# Patient Record
Sex: MALE | Race: WHITE | NOT HISPANIC OR LATINO | Employment: OTHER | URBAN - METROPOLITAN AREA
[De-identification: names, ages, dates, MRNs, and addresses within clinical notes are randomized per-mention and may not be internally consistent; named-entity substitution may affect disease eponyms.]

---

## 2025-01-12 ENCOUNTER — APPOINTMENT (OUTPATIENT)
Dept: CARDIOLOGY | Facility: HOSPITAL | Age: 89
End: 2025-01-12
Payer: MEDICARE

## 2025-01-12 ENCOUNTER — HOSPITAL ENCOUNTER (INPATIENT)
Facility: HOSPITAL | Age: 89
LOS: 2 days | Discharge: HOME | End: 2025-01-15
Attending: EMERGENCY MEDICINE | Admitting: INTERNAL MEDICINE
Payer: MEDICARE

## 2025-01-12 ENCOUNTER — APPOINTMENT (OUTPATIENT)
Dept: RADIOLOGY | Facility: HOSPITAL | Age: 89
End: 2025-01-12
Payer: MEDICARE

## 2025-01-12 DIAGNOSIS — I48.92 ATRIAL FIB/FLUTTER, TRANSIENT (MULTI): ICD-10-CM

## 2025-01-12 DIAGNOSIS — I10 PRIMARY HYPERTENSION: ICD-10-CM

## 2025-01-12 DIAGNOSIS — E03.9 HYPOTHYROIDISM, ACQUIRED: ICD-10-CM

## 2025-01-12 DIAGNOSIS — N18.9 ACUTE KIDNEY INJURY SUPERIMPOSED ON CKD (CMS-HCC): ICD-10-CM

## 2025-01-12 DIAGNOSIS — Z95.810 CARDIAC DEFIBRILLATOR IN PLACE: ICD-10-CM

## 2025-01-12 DIAGNOSIS — L03.90 WOUND CELLULITIS: ICD-10-CM

## 2025-01-12 DIAGNOSIS — N17.9 ACUTE KIDNEY INJURY SUPERIMPOSED ON CKD (CMS-HCC): ICD-10-CM

## 2025-01-12 DIAGNOSIS — H04.123 CHRONICALLY DRY EYES, BILATERAL: ICD-10-CM

## 2025-01-12 DIAGNOSIS — Z45.02 IMPLANTABLE CARDIOVERTER-DEFIBRILLATOR (ICD) DISCHARGE: ICD-10-CM

## 2025-01-12 DIAGNOSIS — I47.10 SVT (SUPRAVENTRICULAR TACHYCARDIA) (CMS-HCC): Primary | ICD-10-CM

## 2025-01-12 DIAGNOSIS — I42.8 CARDIOMYOPATHY, NONISCHEMIC (MULTI): Chronic | ICD-10-CM

## 2025-01-12 DIAGNOSIS — M17.12 PRIMARY OSTEOARTHRITIS OF LEFT KNEE: ICD-10-CM

## 2025-01-12 DIAGNOSIS — I48.91 ATRIAL FIB/FLUTTER, TRANSIENT (MULTI): ICD-10-CM

## 2025-01-12 DIAGNOSIS — I47.20 VENTRICULAR TACHYARRHYTHMIA (MULTI): ICD-10-CM

## 2025-01-12 DIAGNOSIS — I50.23 ACUTE ON CHRONIC SYSTOLIC HEART FAILURE: ICD-10-CM

## 2025-01-12 DIAGNOSIS — I50.20 HEART FAILURE WITH REDUCED EJECTION FRACTION: ICD-10-CM

## 2025-01-12 DIAGNOSIS — I44.2 CHB (COMPLETE HEART BLOCK): ICD-10-CM

## 2025-01-12 DIAGNOSIS — R00.2 PALPITATIONS: ICD-10-CM

## 2025-01-12 DIAGNOSIS — E87.6 HYPOKALEMIA: ICD-10-CM

## 2025-01-12 DIAGNOSIS — I47.29 VENTRICULAR TACHYCARDIA (PAROXYSMAL) (MULTI): ICD-10-CM

## 2025-01-12 LAB
ALBUMIN SERPL BCP-MCNC: 4.5 G/DL (ref 3.4–5)
ALP SERPL-CCNC: 146 U/L (ref 33–136)
ALT SERPL W P-5'-P-CCNC: 14 U/L (ref 10–52)
ANION GAP SERPL CALC-SCNC: 15 MMOL/L (ref 10–20)
AST SERPL W P-5'-P-CCNC: 21 U/L (ref 9–39)
BASOPHILS # BLD AUTO: 0.02 X10*3/UL (ref 0–0.1)
BASOPHILS NFR BLD AUTO: 0.3 %
BILIRUB SERPL-MCNC: 1.7 MG/DL (ref 0–1.2)
BNP SERPL-MCNC: 1216 PG/ML (ref 0–99)
BUN SERPL-MCNC: 35 MG/DL (ref 6–23)
CALCIUM SERPL-MCNC: 8.8 MG/DL (ref 8.6–10.3)
CARDIAC TROPONIN I PNL SERPL HS: 59 NG/L (ref 0–20)
CARDIAC TROPONIN I PNL SERPL HS: 66 NG/L (ref 0–20)
CHLORIDE SERPL-SCNC: 97 MMOL/L (ref 98–107)
CO2 SERPL-SCNC: 30 MMOL/L (ref 21–32)
CREAT SERPL-MCNC: 1.93 MG/DL (ref 0.5–1.3)
EGFRCR SERPLBLD CKD-EPI 2021: 33 ML/MIN/1.73M*2
EOSINOPHIL # BLD AUTO: 0.02 X10*3/UL (ref 0–0.4)
EOSINOPHIL NFR BLD AUTO: 0.3 %
ERYTHROCYTE [DISTWIDTH] IN BLOOD BY AUTOMATED COUNT: 16.5 % (ref 11.5–14.5)
GLUCOSE BLD MANUAL STRIP-MCNC: 189 MG/DL (ref 74–99)
GLUCOSE SERPL-MCNC: 240 MG/DL (ref 74–99)
HCT VFR BLD AUTO: 45.7 % (ref 41–52)
HGB BLD-MCNC: 14.3 G/DL (ref 13.5–17.5)
IMM GRANULOCYTES # BLD AUTO: 0.01 X10*3/UL (ref 0–0.5)
IMM GRANULOCYTES NFR BLD AUTO: 0.2 % (ref 0–0.9)
LYMPHOCYTES # BLD AUTO: 1.32 X10*3/UL (ref 0.8–3)
LYMPHOCYTES NFR BLD AUTO: 21.6 %
MAGNESIUM SERPL-MCNC: 2.26 MG/DL (ref 1.6–2.4)
MCH RBC QN AUTO: 27.6 PG (ref 26–34)
MCHC RBC AUTO-ENTMCNC: 31.3 G/DL (ref 32–36)
MCV RBC AUTO: 88 FL (ref 80–100)
MONOCYTES # BLD AUTO: 0.42 X10*3/UL (ref 0.05–0.8)
MONOCYTES NFR BLD AUTO: 6.9 %
NEUTROPHILS # BLD AUTO: 4.31 X10*3/UL (ref 1.6–5.5)
NEUTROPHILS NFR BLD AUTO: 70.7 %
NRBC BLD-RTO: 0 /100 WBCS (ref 0–0)
PLATELET # BLD AUTO: 174 X10*3/UL (ref 150–450)
POTASSIUM SERPL-SCNC: 3.2 MMOL/L (ref 3.5–5.3)
PROT SERPL-MCNC: 7.7 G/DL (ref 6.4–8.2)
RBC # BLD AUTO: 5.18 X10*6/UL (ref 4.5–5.9)
SODIUM SERPL-SCNC: 139 MMOL/L (ref 136–145)
WBC # BLD AUTO: 6.1 X10*3/UL (ref 4.4–11.3)

## 2025-01-12 PROCEDURE — 93005 ELECTROCARDIOGRAM TRACING: CPT

## 2025-01-12 PROCEDURE — 2500000004 HC RX 250 GENERAL PHARMACY W/ HCPCS (ALT 636 FOR OP/ED): Performed by: EMERGENCY MEDICINE

## 2025-01-12 PROCEDURE — 36415 COLL VENOUS BLD VENIPUNCTURE: CPT | Performed by: EMERGENCY MEDICINE

## 2025-01-12 PROCEDURE — 84100 ASSAY OF PHOSPHORUS: CPT | Performed by: EMERGENCY MEDICINE

## 2025-01-12 PROCEDURE — 71045 X-RAY EXAM CHEST 1 VIEW: CPT

## 2025-01-12 PROCEDURE — 85025 COMPLETE CBC W/AUTO DIFF WBC: CPT | Performed by: EMERGENCY MEDICINE

## 2025-01-12 PROCEDURE — 84484 ASSAY OF TROPONIN QUANT: CPT | Performed by: EMERGENCY MEDICINE

## 2025-01-12 PROCEDURE — 96374 THER/PROPH/DIAG INJ IV PUSH: CPT

## 2025-01-12 PROCEDURE — 80053 COMPREHEN METABOLIC PANEL: CPT | Performed by: EMERGENCY MEDICINE

## 2025-01-12 PROCEDURE — 71045 X-RAY EXAM CHEST 1 VIEW: CPT | Performed by: STUDENT IN AN ORGANIZED HEALTH CARE EDUCATION/TRAINING PROGRAM

## 2025-01-12 PROCEDURE — 99291 CRITICAL CARE FIRST HOUR: CPT

## 2025-01-12 PROCEDURE — 99291 CRITICAL CARE FIRST HOUR: CPT | Mod: 25 | Performed by: EMERGENCY MEDICINE

## 2025-01-12 PROCEDURE — 83735 ASSAY OF MAGNESIUM: CPT | Performed by: EMERGENCY MEDICINE

## 2025-01-12 PROCEDURE — 83880 ASSAY OF NATRIURETIC PEPTIDE: CPT | Performed by: EMERGENCY MEDICINE

## 2025-01-12 PROCEDURE — 82947 ASSAY GLUCOSE BLOOD QUANT: CPT

## 2025-01-12 RX ORDER — POTASSIUM CHLORIDE 20 MEQ/1
40 TABLET, EXTENDED RELEASE ORAL DAILY
Status: DISCONTINUED | OUTPATIENT
Start: 2025-01-13 | End: 2025-01-13

## 2025-01-12 RX ADMIN — AMIODARONE HYDROCHLORIDE 1 MG/MIN: 1.8 INJECTION, SOLUTION INTRAVENOUS at 21:26

## 2025-01-12 RX ADMIN — AMIODARONE HYDROCHLORIDE 150 MG: 1.5 INJECTION, SOLUTION INTRAVENOUS at 21:14

## 2025-01-12 ASSESSMENT — COLUMBIA-SUICIDE SEVERITY RATING SCALE - C-SSRS
2. HAVE YOU ACTUALLY HAD ANY THOUGHTS OF KILLING YOURSELF?: NO
1. IN THE PAST MONTH, HAVE YOU WISHED YOU WERE DEAD OR WISHED YOU COULD GO TO SLEEP AND NOT WAKE UP?: NO
6. HAVE YOU EVER DONE ANYTHING, STARTED TO DO ANYTHING, OR PREPARED TO DO ANYTHING TO END YOUR LIFE?: NO

## 2025-01-12 ASSESSMENT — PAIN SCALES - GENERAL
PAINLEVEL_OUTOF10: 2
PAINLEVEL_OUTOF10: 8

## 2025-01-12 ASSESSMENT — PAIN DESCRIPTION - DESCRIPTORS: DESCRIPTORS: DISCOMFORT

## 2025-01-12 ASSESSMENT — PAIN - FUNCTIONAL ASSESSMENT: PAIN_FUNCTIONAL_ASSESSMENT: 0-10

## 2025-01-12 ASSESSMENT — PAIN DESCRIPTION - PAIN TYPE: TYPE: ACUTE PAIN

## 2025-01-12 ASSESSMENT — PAIN DESCRIPTION - LOCATION: LOCATION: CHEST

## 2025-01-13 ENCOUNTER — APPOINTMENT (OUTPATIENT)
Dept: CARDIOLOGY | Facility: HOSPITAL | Age: 89
End: 2025-01-13
Payer: MEDICARE

## 2025-01-13 PROBLEM — H04.123 CHRONICALLY DRY EYES, BILATERAL: Status: ACTIVE | Noted: 2017-09-28

## 2025-01-13 PROBLEM — R07.9 CHEST PAIN: Status: ACTIVE | Noted: 2024-08-31

## 2025-01-13 PROBLEM — I21.3 ST ELEVATION MYOCARDIAL INFARCTION (STEMI) (MULTI): Status: ACTIVE | Noted: 2018-07-18

## 2025-01-13 PROBLEM — E03.9 HYPOTHYROIDISM, ACQUIRED: Status: ACTIVE | Noted: 2022-04-26

## 2025-01-13 PROBLEM — L03.90 WOUND CELLULITIS: Status: ACTIVE | Noted: 2022-09-12

## 2025-01-13 PROBLEM — E87.6 HYPOKALEMIA: Status: ACTIVE | Noted: 2024-09-28

## 2025-01-13 PROBLEM — I50.23 ACUTE ON CHRONIC SYSTOLIC HEART FAILURE: Status: ACTIVE | Noted: 2023-11-02

## 2025-01-13 PROBLEM — N18.9 ACUTE KIDNEY INJURY SUPERIMPOSED ON CKD (CMS-HCC): Status: ACTIVE | Noted: 2024-09-28

## 2025-01-13 PROBLEM — M17.12 PRIMARY OSTEOARTHRITIS OF LEFT KNEE: Status: ACTIVE | Noted: 2018-06-29

## 2025-01-13 PROBLEM — R00.2 PALPITATIONS: Status: ACTIVE | Noted: 2024-08-29

## 2025-01-13 PROBLEM — I50.20 HEART FAILURE WITH REDUCED EJECTION FRACTION: Status: ACTIVE | Noted: 2024-09-01

## 2025-01-13 PROBLEM — I44.2 CHB (COMPLETE HEART BLOCK): Status: ACTIVE | Noted: 2024-03-14

## 2025-01-13 PROBLEM — J18.9 PNEUMONIA DUE TO INFECTIOUS ORGANISM: Status: ACTIVE | Noted: 2024-12-26

## 2025-01-13 PROBLEM — N17.9 ACUTE KIDNEY INJURY SUPERIMPOSED ON CKD (CMS-HCC): Status: ACTIVE | Noted: 2024-09-28

## 2025-01-13 LAB
ALBUMIN SERPL BCP-MCNC: 3.3 G/DL (ref 3.4–5)
ANION GAP SERPL CALC-SCNC: 12 MMOL/L (ref 10–20)
AORTIC VALVE MEAN GRADIENT: 1 MMHG
AORTIC VALVE PEAK VELOCITY: 0.68 M/S
ATRIAL RATE: 105 BPM
ATRIAL RATE: 77 BPM
AV PEAK GRADIENT: 2 MMHG
AVA (PEAK VEL): 2.48 CM2
AVA (VTI): 2.82 CM2
BUN SERPL-MCNC: 34 MG/DL (ref 6–23)
CALCIUM SERPL-MCNC: 8.2 MG/DL (ref 8.6–10.3)
CHLORIDE SERPL-SCNC: 103 MMOL/L (ref 98–107)
CO2 SERPL-SCNC: 30 MMOL/L (ref 21–32)
CREAT SERPL-MCNC: 1.72 MG/DL (ref 0.5–1.3)
EGFRCR SERPLBLD CKD-EPI 2021: 38 ML/MIN/1.73M*2
EJECTION FRACTION APICAL 4 CHAMBER: 19.4
EJECTION FRACTION: 23 %
ERYTHROCYTE [DISTWIDTH] IN BLOOD BY AUTOMATED COUNT: 16.7 % (ref 11.5–14.5)
GLUCOSE BLD MANUAL STRIP-MCNC: 177 MG/DL (ref 74–99)
GLUCOSE BLD MANUAL STRIP-MCNC: 183 MG/DL (ref 74–99)
GLUCOSE BLD MANUAL STRIP-MCNC: 272 MG/DL (ref 74–99)
GLUCOSE BLD MANUAL STRIP-MCNC: 79 MG/DL (ref 74–99)
GLUCOSE BLD MANUAL STRIP-MCNC: 87 MG/DL (ref 74–99)
GLUCOSE SERPL-MCNC: 220 MG/DL (ref 74–99)
HCT VFR BLD AUTO: 44.3 % (ref 41–52)
HGB BLD-MCNC: 13.8 G/DL (ref 13.5–17.5)
LEFT ATRIUM VOLUME AREA LENGTH INDEX BSA: 47.3 ML/M2
LEFT VENTRICLE INTERNAL DIMENSION DIASTOLE: 5.52 CM (ref 3.5–6)
LEFT VENTRICULAR OUTFLOW TRACT DIAMETER: 2.31 CM
MAGNESIUM SERPL-MCNC: 2.19 MG/DL (ref 1.6–2.4)
MCH RBC QN AUTO: 27.4 PG (ref 26–34)
MCHC RBC AUTO-ENTMCNC: 31.2 G/DL (ref 32–36)
MCV RBC AUTO: 88 FL (ref 80–100)
NRBC BLD-RTO: 0 /100 WBCS (ref 0–0)
PHOSPHATE SERPL-MCNC: 3.2 MG/DL (ref 2.5–4.9)
PHOSPHATE SERPL-MCNC: 3.8 MG/DL (ref 2.5–4.9)
PLATELET # BLD AUTO: 154 X10*3/UL (ref 150–450)
POTASSIUM SERPL-SCNC: 2.9 MMOL/L (ref 3.5–5.3)
Q ONSET: 189 MS
Q ONSET: 191 MS
QRS COUNT: 11 BEATS
QRS COUNT: 18 BEATS
QRS DURATION: 188 MS
QRS DURATION: 192 MS
QT INTERVAL: 452 MS
QT INTERVAL: 522 MS
QTC CALCULATION(BAZETT): 563 MS
QTC CALCULATION(BAZETT): 611 MS
QTC FREDERICIA: 549 MS
QTC FREDERICIA: 553 MS
R AXIS: 199 DEGREES
R AXIS: 213 DEGREES
RBC # BLD AUTO: 5.04 X10*6/UL (ref 4.5–5.9)
RIGHT VENTRICLE FREE WALL PEAK S': 8 CM/S
RIGHT VENTRICLE PEAK SYSTOLIC PRESSURE: 23.2 MMHG
SODIUM SERPL-SCNC: 142 MMOL/L (ref 136–145)
T AXIS: 33 DEGREES
T AXIS: 42 DEGREES
T OFFSET: 415 MS
T OFFSET: 452 MS
TRICUSPID ANNULAR PLANE SYSTOLIC EXCURSION: 0.8 CM
UFH PPP CHRO-ACNC: 0.7 IU/ML
UFH PPP CHRO-ACNC: 0.9 IU/ML
UFH PPP CHRO-ACNC: 1.2 IU/ML
UFH PPP CHRO-ACNC: 1.5 IU/ML
VENTRICULAR RATE: 110 BPM
VENTRICULAR RATE: 70 BPM
WBC # BLD AUTO: 5.2 X10*3/UL (ref 4.4–11.3)

## 2025-01-13 PROCEDURE — 2500000004 HC RX 250 GENERAL PHARMACY W/ HCPCS (ALT 636 FOR OP/ED)

## 2025-01-13 PROCEDURE — 99291 CRITICAL CARE FIRST HOUR: CPT | Performed by: STUDENT IN AN ORGANIZED HEALTH CARE EDUCATION/TRAINING PROGRAM

## 2025-01-13 PROCEDURE — 82947 ASSAY GLUCOSE BLOOD QUANT: CPT

## 2025-01-13 PROCEDURE — 4B02XTZ MEASUREMENT OF CARDIAC DEFIBRILLATOR, EXTERNAL APPROACH: ICD-10-PCS | Performed by: EMERGENCY MEDICINE

## 2025-01-13 PROCEDURE — 93306 TTE W/DOPPLER COMPLETE: CPT | Performed by: STUDENT IN AN ORGANIZED HEALTH CARE EDUCATION/TRAINING PROGRAM

## 2025-01-13 PROCEDURE — 99223 1ST HOSP IP/OBS HIGH 75: CPT | Performed by: STUDENT IN AN ORGANIZED HEALTH CARE EDUCATION/TRAINING PROGRAM

## 2025-01-13 PROCEDURE — C8929 TTE W OR WO FOL WCON,DOPPLER: HCPCS

## 2025-01-13 PROCEDURE — 93010 ELECTROCARDIOGRAM REPORT: CPT | Performed by: INTERNAL MEDICINE

## 2025-01-13 PROCEDURE — 80069 RENAL FUNCTION PANEL: CPT

## 2025-01-13 PROCEDURE — 99291 CRITICAL CARE FIRST HOUR: CPT | Performed by: INTERNAL MEDICINE

## 2025-01-13 PROCEDURE — 85027 COMPLETE CBC AUTOMATED: CPT

## 2025-01-13 PROCEDURE — 2500000001 HC RX 250 WO HCPCS SELF ADMINISTERED DRUGS (ALT 637 FOR MEDICARE OP)

## 2025-01-13 PROCEDURE — 2060000001 HC INTERMEDIATE ICU ROOM DAILY

## 2025-01-13 PROCEDURE — 36415 COLL VENOUS BLD VENIPUNCTURE: CPT

## 2025-01-13 PROCEDURE — 85520 HEPARIN ASSAY: CPT

## 2025-01-13 PROCEDURE — 94760 N-INVAS EAR/PLS OXIMETRY 1: CPT

## 2025-01-13 PROCEDURE — 2500000004 HC RX 250 GENERAL PHARMACY W/ HCPCS (ALT 636 FOR OP/ED): Performed by: EMERGENCY MEDICINE

## 2025-01-13 PROCEDURE — 9420000001 HC RT PATIENT EDUCATION 5 MIN

## 2025-01-13 PROCEDURE — 2500000002 HC RX 250 W HCPCS SELF ADMINISTERED DRUGS (ALT 637 FOR MEDICARE OP, ALT 636 FOR OP/ED)

## 2025-01-13 PROCEDURE — 83735 ASSAY OF MAGNESIUM: CPT

## 2025-01-13 PROCEDURE — 85520 HEPARIN ASSAY: CPT | Performed by: INTERNAL MEDICINE

## 2025-01-13 PROCEDURE — 93005 ELECTROCARDIOGRAM TRACING: CPT

## 2025-01-13 RX ORDER — OXYCODONE HYDROCHLORIDE 5 MG/1
5 TABLET ORAL EVERY 6 HOURS PRN
Status: DISCONTINUED | OUTPATIENT
Start: 2025-01-13 | End: 2025-01-15 | Stop reason: HOSPADM

## 2025-01-13 RX ORDER — CALC/MAG/B COMPLEX/D3/HERB 61
15 TABLET ORAL DAILY PRN
COMMUNITY

## 2025-01-13 RX ORDER — SPIRONOLACTONE 25 MG/1
25 TABLET ORAL
Status: DISCONTINUED | OUTPATIENT
Start: 2025-01-14 | End: 2025-01-15 | Stop reason: HOSPADM

## 2025-01-13 RX ORDER — MAGNESIUM GLYCINATE 100 MG
2 CAPSULE ORAL DAILY
COMMUNITY

## 2025-01-13 RX ORDER — AMIODARONE HYDROCHLORIDE 200 MG/1
200 TABLET ORAL DAILY
COMMUNITY
End: 2025-01-15 | Stop reason: HOSPADM

## 2025-01-13 RX ORDER — LEVOTHYROXINE SODIUM 75 UG/1
75 TABLET ORAL EVERY MORNING
Status: DISCONTINUED | OUTPATIENT
Start: 2025-01-13 | End: 2025-01-15

## 2025-01-13 RX ORDER — CARVEDILOL 12.5 MG/1
25 TABLET ORAL 3 TIMES DAILY
Status: DISCONTINUED | OUTPATIENT
Start: 2025-01-13 | End: 2025-01-15 | Stop reason: HOSPADM

## 2025-01-13 RX ORDER — POTASSIUM CHLORIDE 14.9 MG/ML
20 INJECTION INTRAVENOUS
Status: DISPENSED | OUTPATIENT
Start: 2025-01-13 | End: 2025-01-13

## 2025-01-13 RX ORDER — INSULIN GLARGINE 100 [IU]/ML
15 INJECTION, SOLUTION SUBCUTANEOUS NIGHTLY
COMMUNITY

## 2025-01-13 RX ORDER — FUROSEMIDE 40 MG/1
60 TABLET ORAL DAILY
Status: DISCONTINUED | OUTPATIENT
Start: 2025-01-13 | End: 2025-01-15 | Stop reason: HOSPADM

## 2025-01-13 RX ORDER — ROSUVASTATIN CALCIUM 10 MG/1
10 TABLET, COATED ORAL DAILY
COMMUNITY

## 2025-01-13 RX ORDER — POTASSIUM CHLORIDE 20 MEQ/1
40 TABLET, EXTENDED RELEASE ORAL ONCE
Status: COMPLETED | OUTPATIENT
Start: 2025-01-13 | End: 2025-01-13

## 2025-01-13 RX ORDER — CARVEDILOL 25 MG/1
25 TABLET ORAL 3 TIMES DAILY
COMMUNITY

## 2025-01-13 RX ORDER — INSULIN GLARGINE 100 [IU]/ML
7 INJECTION, SOLUTION SUBCUTANEOUS NIGHTLY
Status: DISCONTINUED | OUTPATIENT
Start: 2025-01-13 | End: 2025-01-13

## 2025-01-13 RX ORDER — POTASSIUM CHLORIDE 20 MEQ/1
20 TABLET, EXTENDED RELEASE ORAL DAILY
COMMUNITY
End: 2025-01-15 | Stop reason: HOSPADM

## 2025-01-13 RX ORDER — SPIRONOLACTONE 25 MG/1
12.5 TABLET ORAL
Status: DISCONTINUED | OUTPATIENT
Start: 2025-01-13 | End: 2025-01-13

## 2025-01-13 RX ORDER — INSULIN GLARGINE 100 [IU]/ML
15 INJECTION, SOLUTION SUBCUTANEOUS NIGHTLY
Status: DISCONTINUED | OUTPATIENT
Start: 2025-01-13 | End: 2025-01-15 | Stop reason: HOSPADM

## 2025-01-13 RX ORDER — LEVOTHYROXINE SODIUM 75 UG/1
75 TABLET ORAL
COMMUNITY
Start: 2024-10-21

## 2025-01-13 RX ORDER — TAMSULOSIN HYDROCHLORIDE 0.4 MG/1
0.4 CAPSULE ORAL NIGHTLY
COMMUNITY
Start: 2024-04-29

## 2025-01-13 RX ORDER — HEPARIN SODIUM 10000 [USP'U]/100ML
0-4500 INJECTION, SOLUTION INTRAVENOUS CONTINUOUS
Status: DISCONTINUED | OUTPATIENT
Start: 2025-01-13 | End: 2025-01-15

## 2025-01-13 RX ORDER — INSULIN LISPRO 100 [IU]/ML
0-10 INJECTION, SOLUTION INTRAVENOUS; SUBCUTANEOUS
Status: DISCONTINUED | OUTPATIENT
Start: 2025-01-13 | End: 2025-01-14

## 2025-01-13 RX ORDER — OXYCODONE AND ACETAMINOPHEN 5; 325 MG/1; MG/1
1 TABLET ORAL EVERY 8 HOURS PRN
COMMUNITY

## 2025-01-13 RX ORDER — FUROSEMIDE 20 MG/1
60 TABLET ORAL DAILY
COMMUNITY

## 2025-01-13 RX ORDER — LISINOPRIL 10 MG/1
10 TABLET ORAL DAILY
COMMUNITY

## 2025-01-13 RX ORDER — MAGNESIUM GLYCINATE 100 MG
200 CAPSULE ORAL
COMMUNITY
Start: 2024-09-29

## 2025-01-13 RX ORDER — POTASSIUM CHLORIDE 1.5 G/1.58G
40 POWDER, FOR SOLUTION ORAL ONCE
Status: COMPLETED | OUTPATIENT
Start: 2025-01-13 | End: 2025-01-13

## 2025-01-13 RX ORDER — DEXTROSE 50 % IN WATER (D50W) INTRAVENOUS SYRINGE
12.5
Status: DISCONTINUED | OUTPATIENT
Start: 2025-01-13 | End: 2025-01-15 | Stop reason: HOSPADM

## 2025-01-13 RX ORDER — DEXTROSE 50 % IN WATER (D50W) INTRAVENOUS SYRINGE
25
Status: DISCONTINUED | OUTPATIENT
Start: 2025-01-13 | End: 2025-01-15 | Stop reason: HOSPADM

## 2025-01-13 RX ORDER — LISINOPRIL 5 MG/1
10 TABLET ORAL DAILY
Status: DISCONTINUED | OUTPATIENT
Start: 2025-01-13 | End: 2025-01-15 | Stop reason: HOSPADM

## 2025-01-13 RX ORDER — GLIPIZIDE 5 MG/1
5 TABLET, FILM COATED, EXTENDED RELEASE ORAL DAILY
COMMUNITY

## 2025-01-13 RX ORDER — INSULIN LISPRO 100 [IU]/ML
0-10 INJECTION, SOLUTION INTRAVENOUS; SUBCUTANEOUS EVERY 4 HOURS
Status: DISCONTINUED | OUTPATIENT
Start: 2025-01-13 | End: 2025-01-13

## 2025-01-13 RX ORDER — ACETAMINOPHEN 325 MG/1
650 TABLET ORAL EVERY 6 HOURS PRN
Status: DISCONTINUED | OUTPATIENT
Start: 2025-01-13 | End: 2025-01-15 | Stop reason: HOSPADM

## 2025-01-13 RX ORDER — POTASSIUM CHLORIDE 20 MEQ/1
40 TABLET, EXTENDED RELEASE ORAL ONCE
Status: DISCONTINUED | OUTPATIENT
Start: 2025-01-13 | End: 2025-01-14

## 2025-01-13 RX ORDER — POTASSIUM CHLORIDE 20 MEQ/1
20 TABLET, EXTENDED RELEASE ORAL DAILY
Status: DISCONTINUED | OUTPATIENT
Start: 2025-01-14 | End: 2025-01-15

## 2025-01-13 RX ORDER — AMIODARONE HYDROCHLORIDE 200 MG/1
200 TABLET ORAL DAILY
Status: DISCONTINUED | OUTPATIENT
Start: 2025-01-13 | End: 2025-01-15

## 2025-01-13 RX ORDER — OXYCODONE HYDROCHLORIDE 5 MG/1
2.5 TABLET ORAL EVERY 6 HOURS PRN
Status: DISCONTINUED | OUTPATIENT
Start: 2025-01-13 | End: 2025-01-15 | Stop reason: HOSPADM

## 2025-01-13 RX ORDER — TAMSULOSIN HYDROCHLORIDE 0.4 MG/1
0.4 CAPSULE ORAL DAILY
Status: DISCONTINUED | OUTPATIENT
Start: 2025-01-13 | End: 2025-01-15 | Stop reason: HOSPADM

## 2025-01-13 RX ADMIN — POTASSIUM CHLORIDE 20 MEQ: 14.9 INJECTION, SOLUTION INTRAVENOUS at 14:08

## 2025-01-13 RX ADMIN — HEPARIN SODIUM 1500 UNITS/HR: 10000 INJECTION, SOLUTION INTRAVENOUS at 01:25

## 2025-01-13 RX ADMIN — TAMSULOSIN HYDROCHLORIDE 0.4 MG: 0.4 CAPSULE ORAL at 10:01

## 2025-01-13 RX ADMIN — SPIRONOLACTONE 12.5 MG: 25 TABLET ORAL at 14:36

## 2025-01-13 RX ADMIN — POTASSIUM CHLORIDE 40 MEQ: 1.5 POWDER, FOR SOLUTION ORAL at 14:36

## 2025-01-13 RX ADMIN — AMIODARONE HYDROCHLORIDE 0.5 MG/MIN: 1.8 INJECTION, SOLUTION INTRAVENOUS at 15:42

## 2025-01-13 RX ADMIN — INSULIN LISPRO 6 UNITS: 100 INJECTION, SOLUTION INTRAVENOUS; SUBCUTANEOUS at 05:12

## 2025-01-13 RX ADMIN — AMIODARONE HYDROCHLORIDE 0.5 MG/MIN: 1.8 INJECTION, SOLUTION INTRAVENOUS at 05:26

## 2025-01-13 RX ADMIN — CARVEDILOL 25 MG: 12.5 TABLET, FILM COATED ORAL at 20:41

## 2025-01-13 RX ADMIN — CARVEDILOL 25 MG: 12.5 TABLET, FILM COATED ORAL at 14:08

## 2025-01-13 RX ADMIN — FUROSEMIDE 60 MG: 40 TABLET ORAL at 10:01

## 2025-01-13 RX ADMIN — CARVEDILOL 25 MG: 12.5 TABLET, FILM COATED ORAL at 10:01

## 2025-01-13 RX ADMIN — PERFLUTREN 2 ML OF DILUTION: 6.52 INJECTION, SUSPENSION INTRAVENOUS at 09:28

## 2025-01-13 RX ADMIN — POTASSIUM CHLORIDE 40 MEQ: 1500 TABLET, EXTENDED RELEASE ORAL at 02:27

## 2025-01-13 RX ADMIN — LEVOTHYROXINE SODIUM 75 MCG: 75 TABLET ORAL at 10:01

## 2025-01-13 RX ADMIN — AMIODARONE HYDROCHLORIDE 1 MG/MIN: 1.8 INJECTION, SOLUTION INTRAVENOUS at 03:43

## 2025-01-13 RX ADMIN — INSULIN GLARGINE 15 UNITS: 100 INJECTION, SOLUTION SUBCUTANEOUS at 20:41

## 2025-01-13 RX ADMIN — POTASSIUM CHLORIDE 20 MEQ: 14.9 INJECTION, SOLUTION INTRAVENOUS at 10:01

## 2025-01-13 RX ADMIN — INSULIN LISPRO 2 UNITS: 100 INJECTION, SOLUTION INTRAVENOUS; SUBCUTANEOUS at 01:24

## 2025-01-13 SDOH — SOCIAL STABILITY: SOCIAL INSECURITY: ABUSE: ADULT

## 2025-01-13 SDOH — SOCIAL STABILITY: SOCIAL INSECURITY: ARE YOU OR HAVE YOU BEEN THREATENED OR ABUSED PHYSICALLY, EMOTIONALLY, OR SEXUALLY BY ANYONE?: NO

## 2025-01-13 SDOH — SOCIAL STABILITY: SOCIAL INSECURITY: DO YOU FEEL ANYONE HAS EXPLOITED OR TAKEN ADVANTAGE OF YOU FINANCIALLY OR OF YOUR PERSONAL PROPERTY?: NO

## 2025-01-13 SDOH — ECONOMIC STABILITY: FOOD INSECURITY: WITHIN THE PAST 12 MONTHS, THE FOOD YOU BOUGHT JUST DIDN'T LAST AND YOU DIDN'T HAVE MONEY TO GET MORE.: NEVER TRUE

## 2025-01-13 SDOH — ECONOMIC STABILITY: INCOME INSECURITY: IN THE PAST 12 MONTHS HAS THE ELECTRIC, GAS, OIL, OR WATER COMPANY THREATENED TO SHUT OFF SERVICES IN YOUR HOME?: NO

## 2025-01-13 SDOH — SOCIAL STABILITY: SOCIAL INSECURITY
WITHIN THE LAST YEAR, HAVE YOU BEEN RAPED OR FORCED TO HAVE ANY KIND OF SEXUAL ACTIVITY BY YOUR PARTNER OR EX-PARTNER?: NO

## 2025-01-13 SDOH — SOCIAL STABILITY: SOCIAL INSECURITY: ARE THERE ANY APPARENT SIGNS OF INJURIES/BEHAVIORS THAT COULD BE RELATED TO ABUSE/NEGLECT?: NO

## 2025-01-13 SDOH — ECONOMIC STABILITY: FOOD INSECURITY: WITHIN THE PAST 12 MONTHS, YOU WORRIED THAT YOUR FOOD WOULD RUN OUT BEFORE YOU GOT THE MONEY TO BUY MORE.: NEVER TRUE

## 2025-01-13 SDOH — SOCIAL STABILITY: SOCIAL INSECURITY: WITHIN THE LAST YEAR, HAVE YOU BEEN HUMILIATED OR EMOTIONALLY ABUSED IN OTHER WAYS BY YOUR PARTNER OR EX-PARTNER?: NO

## 2025-01-13 SDOH — SOCIAL STABILITY: SOCIAL INSECURITY: HAVE YOU HAD THOUGHTS OF HARMING ANYONE ELSE?: NO

## 2025-01-13 SDOH — SOCIAL STABILITY: SOCIAL INSECURITY: HAVE YOU HAD ANY THOUGHTS OF HARMING ANYONE ELSE?: NO

## 2025-01-13 SDOH — SOCIAL STABILITY: SOCIAL INSECURITY: WITHIN THE LAST YEAR, HAVE YOU BEEN AFRAID OF YOUR PARTNER OR EX-PARTNER?: NO

## 2025-01-13 SDOH — SOCIAL STABILITY: SOCIAL INSECURITY: WERE YOU ABLE TO COMPLETE ALL THE BEHAVIORAL HEALTH SCREENINGS?: YES

## 2025-01-13 SDOH — SOCIAL STABILITY: SOCIAL INSECURITY: DOES ANYONE TRY TO KEEP YOU FROM HAVING/CONTACTING OTHER FRIENDS OR DOING THINGS OUTSIDE YOUR HOME?: NO

## 2025-01-13 SDOH — SOCIAL STABILITY: SOCIAL INSECURITY: DO YOU FEEL UNSAFE GOING BACK TO THE PLACE WHERE YOU ARE LIVING?: NO

## 2025-01-13 SDOH — SOCIAL STABILITY: SOCIAL INSECURITY
WITHIN THE LAST YEAR, HAVE YOU BEEN KICKED, HIT, SLAPPED, OR OTHERWISE PHYSICALLY HURT BY YOUR PARTNER OR EX-PARTNER?: NO

## 2025-01-13 SDOH — ECONOMIC STABILITY: HOUSING INSECURITY: IN THE PAST 12 MONTHS, HOW MANY TIMES HAVE YOU MOVED WHERE YOU WERE LIVING?: 0

## 2025-01-13 SDOH — SOCIAL STABILITY: SOCIAL INSECURITY: HAS ANYONE EVER THREATENED TO HURT YOUR FAMILY OR YOUR PETS?: NO

## 2025-01-13 ASSESSMENT — COGNITIVE AND FUNCTIONAL STATUS - GENERAL
DAILY ACTIVITIY SCORE: 21
STANDING UP FROM CHAIR USING ARMS: A LITTLE
PATIENT BASELINE BEDBOUND: NO
DRESSING REGULAR LOWER BODY CLOTHING: A LITTLE
CLIMB 3 TO 5 STEPS WITH RAILING: A LITTLE
HELP NEEDED FOR BATHING: A LITTLE
WALKING IN HOSPITAL ROOM: A LITTLE
MOBILITY SCORE: 20
TOILETING: A LITTLE
MOVING TO AND FROM BED TO CHAIR: A LITTLE

## 2025-01-13 ASSESSMENT — ACTIVITIES OF DAILY LIVING (ADL)
FEEDING YOURSELF: INDEPENDENT
PATIENT'S MEMORY ADEQUATE TO SAFELY COMPLETE DAILY ACTIVITIES?: YES
BATHING: INDEPENDENT
GROOMING: INDEPENDENT
ADEQUATE_TO_COMPLETE_ADL: YES
LACK_OF_TRANSPORTATION: NO
HEARING - LEFT EAR: FUNCTIONAL
HEARING - RIGHT EAR: FUNCTIONAL
DRESSING YOURSELF: INDEPENDENT
LACK_OF_TRANSPORTATION: NO
JUDGMENT_ADEQUATE_SAFELY_COMPLETE_DAILY_ACTIVITIES: YES
TOILETING: INDEPENDENT
WALKS IN HOME: INDEPENDENT

## 2025-01-13 ASSESSMENT — PAIN SCALES - GENERAL
PAINLEVEL_OUTOF10: 0 - NO PAIN

## 2025-01-13 ASSESSMENT — ENCOUNTER SYMPTOMS
FATIGUE: 1
PALPITATIONS: 1
SHORTNESS OF BREATH: 1

## 2025-01-13 ASSESSMENT — PAIN - FUNCTIONAL ASSESSMENT
PAIN_FUNCTIONAL_ASSESSMENT: 0-10

## 2025-01-13 ASSESSMENT — LIFESTYLE VARIABLES
AUDIT-C TOTAL SCORE: 0
HOW OFTEN DO YOU HAVE A DRINK CONTAINING ALCOHOL: NEVER
HOW OFTEN DO YOU HAVE 6 OR MORE DRINKS ON ONE OCCASION: NEVER
AUDIT-C TOTAL SCORE: 0
SKIP TO QUESTIONS 9-10: 1
HOW MANY STANDARD DRINKS CONTAINING ALCOHOL DO YOU HAVE ON A TYPICAL DAY: PATIENT DOES NOT DRINK

## 2025-01-13 ASSESSMENT — PATIENT HEALTH QUESTIONNAIRE - PHQ9
1. LITTLE INTEREST OR PLEASURE IN DOING THINGS: NOT AT ALL
SUM OF ALL RESPONSES TO PHQ9 QUESTIONS 1 & 2: 0
2. FEELING DOWN, DEPRESSED OR HOPELESS: NOT AT ALL

## 2025-01-13 NOTE — CARE PLAN
The patient's goals for the shift include      The clinical goals for the shift include pt will have HR <100 by end of shift

## 2025-01-13 NOTE — ED TRIAGE NOTES
Pt arrived to ED via [facility] ambulance. Pt states that prior to calling 911 he started to experience midsternal chest pain. Pt has a medtronic pacemaker defibrillator in place. When EMS arrived, pt was given a nitroglycerin and he took his own 324mg of aspirin. He states it is just discomfort. Per EMS patient was having runs of VTACH x3 on their monitor.

## 2025-01-13 NOTE — CONSULTS
Inpatient consult to cardiology  Consult performed by: Preston Elliott MD  Consult ordered by: Sarath Bourne, LAUREN-CNP  Reason for consult: VT      History Of Present Illness:    Valentin Rojas is a 89 y.o. male presenting with  with PMH of HFrEF/NICM,  VT s/p ablation (6/27/13, Dr. Mcclendon), redo VT ablation 6/6/14 (Dr. Amin) due to recurrent SMVT requiring ATP/shocks, 3rd redo VT ablation for recurrent VT (10/3/2014, Dr. Poon), post-procedure CHB s/p CRT-D upgrade (7/16/13, generator change 7/2018), atrial fibrillation (DCCV 10/2019), admitted 2/2020 for VT storm with 33 ICD shocks treated with and discharged on amiodarone, with recent admissions to [facility] for ICD shock, CKD3, T2DM, HTN, HLD, GERD, BPH, hypothyroidism, CKD3a who presented to [facility] ED on 1/12/25 with midsternal chest discomfort and palpitations. He follows with cardiology at [facility].    Patient reports near syncope and strong palpitations for a couple of weeks. He started feeling these last evening and decided that he needed to go to the hospital. Per EMS, he was having runs of VT on the monitor. Patient was recently discharged on 12/28/24 after being admitted for HFrEF exacerbation. He was admitted at [facility] 9/2024 with ICD firing. Home medications include amiodarone, Eliquis, Coreg, Jardiance, Lasix, lisinopril, potassium, rosuvastatin. Initial labs significant for potassium 2.9, BUN 34, creatinine 1.72, BNP 1216, troponin 66, 59.    Telemetry shows frequent episodes of NSVT during the night, now doing better. He is currently on amiodarone drip, potassium is being repleted. He is on Coreg 25 mg TID, Lasix 60 mg daily, lisinopril 10 mg daily, spironolactone.     Device interrogation from today revealed one episode of VT treated successfully with ATP, several episodes of NSVT, 100% afib burden. No recent shocks.         Last Recorded Vitals:  Vitals:    01/13/25 0414 01/13/25 0500 01/13/25 0540 01/13/25 0600   BP:  121/82  119/82    BP Location:       Patient Position:       Pulse:  72  72   Resp:  15  18   Temp:       TempSrc:       SpO2: 96% 95%  96%   Weight:   85.1 kg (187 lb 9.8 oz)    Height:           Last Labs:  CBC - 1/13/2025:  6:00 AM  5.2 13.8 154    44.3      CMP - 1/13/2025:  6:00 AM  8.2 7.7 21 --- 1.7   3.2 3.3 14 146      PTT - No results in last year.  _   _ _     Troponin I, High Sensitivity   Date/Time Value Ref Range Status   01/12/2025 10:48 PM 59 (HH) 0 - 20 ng/L Final     Comment:     Previous result verified on 1/12/2025 2156 on specimen/case [id number] called with component [facility] for procedure Troponin I, High Sensitivity, Initial with value 66 ng/L.   01/12/2025 09:22 PM 66 (HH) 0 - 20 ng/L Final     BNP   Date/Time Value Ref Range Status   01/12/2025 09:22 PM 1,216 (H) 0 - 99 pg/mL Final     Hemoglobin A1C   Date/Time Value Ref Range Status   08/29/2024 06:58 PM 7.7 (H) 4.7 - 6.4 % Final     Comment:     Interpretation:     Standardized A1c  Good control or normal:  4-6% ( mg/dL avg)  Moderate control:        6.1-8.0% (120-180 mg/dL avg)  Poor control:            >8.0% (180 mg/dL avg)  With 4% as a baseline, each 1% increase = 30 mg/dL increase in average   glucose.  Taken from DCCT (Diabetes Control Complications Trial)   08/20/2024 09:31 AM 8.1 (H) 4.7 - 6.4 % Final     Comment:     Interpretation:     Standardized A1c  Good control or normal:  4-6% ( mg/dL avg)  Moderate control:        6.1-8.0% (120-180 mg/dL avg)  Poor control:            >8.0% (180 mg/dL avg)  With 4% as a baseline, each 1% increase = 30 mg/dL increase in average   glucose.  Taken from DCCT (Diabetes Control Complications Trial)      Last I/O:  I/O last 3 completed shifts:  In: 426.7 (5 mL/kg) [I.V.:426.7 (5 mL/kg)]  Out: 0 (0 mL/kg)   Weight: 85.1 kg     Past Cardiology Tests (Last 3 Years):  Echo:  9/3/2024 [facility]  CONCLUSIONS:   - Exam indication: Evaluation of known heart failure to guide therapy   - The left ventricle is  "normal in size. There is mild posterior left ventricular   hypertrophy. Left ventricular systolic function is moderately decreased. EF = 36 ±    5% (2D biplane) Left ventricular diastolic function was not evaluated due to   pacing.   - The right ventricle is dilated. Right ventricular systolic function is normal.   - The left atrial cavity is severely dilated.   - The right atrial cavity is mildly dilated.   - The visualized aorta is borderline dilated with a maximal dimension of 3.9 cm.   - There is moderate (2+) tricuspid valve regurgitation.   - Exam was compared with the prior  echocardiographic exam performed on   2/14/2020, LV systolic function has improved ( it was 20%)       Electronically signed by Jenny Wilson MD on 9/3/2024 at 4:55:52 PM      Ejection Fractions:  No results found for: \"EF\"  Cath:  ERVICE DATE: 9/3/2024   SERVICE TIME:  1:07 PM       CARDIOLOGIST: Amber Hurley MD   ATTENDING: Lorri Smith MD   PRIMARY CARE PHYSICIAN: Puja Barrera DO   REFERRING PROVIDER: Puja Barrera DO     [place] STUDY OF HEALTH and AGING SCALE:6=Moderately Frail     CARDIOVASCULAR INSTABILITY:No     PRE-PROCEDURE DIAGNOSIS:   Abnormal Stress Test     POST PROCEDURE DIAGNOSIS:   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 Moderate sedation consisting of continuous ECG,   pulse oximetry and cardiopulmonary monitoring was performed by   the Cardiology Nurse, overseen by supervising physician, for an   intra-service time of 0 hr. 20 min.     Sedative Medications:   Drug: Versed   Dose: 1 mg   Route: IV     Drug: Fentanyl   Dose: 25 mcg   Route: IV   PRIORITY AT TIME OF PROCEDURE: Elective     SITE OF ENTRY: Radial:Right Radial     CONTRAST: Omnipaque 350 mg Iodine/mL (iohexol injection,   solution): 25 mL     ADDITIONAL " PROCEDURES     CORONARY ANGIOGRAPHY:   The patient was taken to the cardiac cath lab where the entry   site was prepped and draped in a sterile manner. Under local   anesthesic with 2% Lidocaine, the vessel was cannulated with   Seldinger's technique using an arterial needle and a 6F sheath   was introduced.     Selective injections were made in the left and right coronary   arteries and various right, left and oblique views were obtained.       ADDITIONAL PROCEDURES     LEFT MAIN TRUNK: No Stenosis     LEFT ANTERIOR DESCENDIN% Stenosis     Location: Proximal      DIAGONAL #1: No Stenosis   DIAGONAL #2: No Stenosis     LEFT CIRCUMFLEX: 20% Stenosis    Location: Proximal     MARGINAL #1: No Stenosis     MARGINAL #2: No Stenosis     MARGINAL #3: No Stenosis     DOMINANT: YES   POSTERIOR DESCENDIN% Stenosis    Location: Proximal      RIGHT CORONARY: No Stenosis     DOMINANT: NO     Hemodynamics:   LVEDP: 8 mm Hg.   LV-Ao gradient : 0 mm Hg.   LVEF: Not done. LEVEF available by nuclear image. + CKD.       The sheath was removed and hemostatsis was established using   manual pressure using wrist band. There was no bleeding at the   end of the procedure. The pt was returned to the recovery room in   a stable condition     ESTIMATED BLOOD LOSS: Less Than Minimal Unless Noted Here.     COMPLICATIONS: None     SPECIMENS: No specimens obtained unless noted here.     CONDITION: Stable     RECOMMENDATIONS: Follow protocol of post-op orders. Aggressive   modification of risk factors. [employer] [facility].   SIGNATURE: Amber Hurley MD PATIENT NAME: Valentin Rojas      Stress Test:  Nuclear Stress Test 2024  PATIENT:   Name: MR. VALENTIN ROJAS   MRN: [id number]   Age: 89 years   Gender: M     CONCLUSIONS:    1. SPECT Perfusion Study: Abnormal.    2. There is mild (<10%) ischemia in the territory of the LCX.    3. There is a small (<10%) fixed perfusion defect in the RCA territory.    4. Left  ventricle is severely dilated. The left ventricle systolic   function is moderately decreased.    5. This is an intermediate risk scan.        LVEF % 34        Past Medical History:  He has no past medical history on file.    Past Surgical History:  He has no past surgical history on file.      Social History:  He reports that he has never smoked. He has never been exposed to tobacco smoke. He has never used smokeless tobacco. No history on file for alcohol use and drug use.    Family History:  No family history on file.     Allergies:  Penicillins    Inpatient Medications:  Scheduled medications   Medication Dose Route Frequency    [Held by provider] amiodarone  200 mg oral Daily    carvedilol  25 mg oral TID    furosemide  60 mg oral Daily    insulin glargine  15 Units subcutaneous Nightly    insulin lispro  0-10 Units subcutaneous TID AC    levothyroxine  75 mcg oral q AM    lisinopril  10 mg oral Daily    perflutren protein A microsphere  0.5 mL intravenous Once in imaging    [START ON 1/14/2025] potassium chloride CR  20 mEq oral Daily    potassium chloride CR  40 mEq oral Once    [START ON 1/14/2025] spironolactone  25 mg oral q24h VENKATESH    sulfur hexafluoride microsphr  2 mL intravenous Once in imaging    tamsulosin  0.4 mg oral Daily     PRN medications   Medication    acetaminophen    dextrose    dextrose    glucagon    glucagon    heparin    oxyCODONE    oxyCODONE     Continuous Medications   Medication Dose Last Rate    amiodarone  0.5 mg/min 0.5 mg/min (01/13/25 1542)    heparin  0-4,500 Units/hr 1,500 Units/hr (01/13/25 0125)     Outpatient Medications:  Current Outpatient Medications   Medication Instructions    amiodarone (PACERONE) 200 mg, Daily    apixaban (ELIQUIS) 2.5 mg, 2 times daily    carvedilol (COREG) 25 mg, oral, 3 times daily    empagliflozin (JARDIANCE) 25 mg, Daily    furosemide (LASIX) 60 mg, Daily    glipiZIDE XL (GLUCOTROL XL) 5 mg, Daily    lansoprazole (PREVACID) 15 mg, oral, Daily  PRN, Do not crush or chew.    Lantus U-100 Insulin 15 Units, Nightly    levothyroxine (SYNTHROID, LEVOXYL) 75 mcg, Daily RT    lisinopril 10 mg, Daily    magnesium glycinate 100 mg magnesium capsule 2 capsules, oral, Daily    magnesium glycinate 200 mg, Daily RT    oxyCODONE-acetaminophen (Percocet) 5-325 mg tablet 1 tablet, oral, Every 8 hours PRN    potassium chloride CR 20 mEq ER tablet 20 mEq, Daily    rosuvastatin (CRESTOR) 10 mg, oral, Daily    tamsulosin (FLOMAX) 0.4 mg, Nightly   Review of Systems   Constitutional:  Positive for fatigue.   Respiratory:  Positive for shortness of breath.    Cardiovascular:  Positive for palpitations and leg swelling.   All other systems reviewed and are negative.         Physical Exam  Vitals reviewed.   Constitutional:       Appearance: Normal appearance. He is normal weight.   HENT:      Head: Normocephalic and atraumatic.   Neck:      Vascular: JVD present. No carotid bruit.   Cardiovascular:      Rate and Rhythm: Normal rate and regular rhythm.      Pulses: Normal pulses.      Heart sounds: Normal heart sounds.   Pulmonary:      Effort: Pulmonary effort is normal.      Breath sounds: Normal breath sounds.   Abdominal:      General: Abdomen is flat. Bowel sounds are normal.      Palpations: Abdomen is soft.   Musculoskeletal:      Right lower le+ Pitting Edema present.      Left lower le+ Pitting Edema present.   Skin:     General: Skin is warm and dry.   Neurological:      General: No focal deficit present.      Mental Status: He is alert and oriented to person, place, and time. Mental status is at baseline.   Psychiatric:         Mood and Affect: Mood normal.         Behavior: Behavior normal.            Assessment/Plan   Patient with history of multiple comorbidities including nonischemic cardiomyopathy status post CRT-D, atrial fibrillation, VT status post multiple ablations and device therapies including shocks with multiple hospital admissions.  Admitted with  VT/nonsustained VT status post ATP, no recent shocks.  Currently on amiodarone IV and GDMT.  Eliquis on hold, on heparin drip.    Patient is doing better since this morning.  I would recommend continuing the same medical therapy. Once patient is loaded with 10 g of amiodarone, I would recommend keeping the him on amiodarone 400 mg daily as a maintenance dose.  Keep potassium over 4 and magnesium over 2.    Peripheral IV 01/12/25 18 G Distal;Right Forearm (Active)   Site Assessment Clean;Dry;Intact 01/13/25 0900   Dressing Status Clean;Dry 01/13/25 0900   Number of days: 1       Peripheral IV 01/13/25 20 G Proximal;Right;Anterior Forearm (Active)   Site Assessment Clean;Dry;Intact 01/13/25 0900   Dressing Status Clean;Dry 01/13/25 0900   Number of days: 0       Code Status:  Full Code    I spent 80 minutes in the professional and overall care of this patient.        Preston Fallon MD

## 2025-01-13 NOTE — HOSPITAL COURSE
Valentin Rojas is an 89 year old male with PMHx of HFrEF/NICM (EF 36% 9/2024) s/p Medtronic ICD placement, CHB s/p CRT, CKD3, T2DM, HTN, HLD, GERD, BPH, hypothyroidism, V. Tach s/p ablation, A-fib (on Eliquis) who presented to [facility] ED on 1/12/25 with midsternal chest discomfort. When EMS arrived, he was given nitroglycerin and took his own 324mg of aspirin. Per EMS, he was having runs of V tach x3 on the monitor. Per patient's son at bedside, he states that he spoke to his father on the phone this morning around 9am and he sounded more short of breath than normal. Patient explained that this is around the time he started to generally feel unwell. He felt like his heart was fluttering the entire day and felt as if he was going to pass out, this is all very similar to how he has felt during his previous V. Tach episodes. Patient states that the chest discomfort started around 6pm today. It was non-radiating in nature and he states that the pain would come and go. It is slightly reproducible on exam. He does have history of V. Tach with ICD firing and states he has had about 2 or 3 ablations in the past, thinks his last one was roughly 8-9 years ago. 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     On arrival to the ICU, he endorsed chest discomfort but denied SOB, abdominal pain, n/v, diarrhea/constipation, or urinary symptoms.  Chest discomfort improved quickly, cardiology is consulted, and recommended EP evaluation, device check, TTE.  Patient started on amiodarone drip, Eliquis held and switched to heparin.  Continuing home medication.  Added spironolactone.  Replete electrolytes.  EP recommended 1G IV load, followed by 10G p.o. load and then followed by 400 Mg daily maintenance of amiodarone.  Patient completed IV amiodarone load and was transition to p.o.  Heparin switched back to Eliquis.  Patient asymptomatic, hemodynamically  stable, states he is doing well.  Stable for discharge.  Advised to follow-up with EP & cardiology & PCP.  Potassium changed to packets per patient preference.

## 2025-01-13 NOTE — CONSULTS
Inpatient consult to Cardiology  Consult performed by: Sarath Bourne, LAUREN-CNP  Consult ordered by: Shelbie Swain DO        History Of Present Illness:    Valentin Rojas is a 89 y.o. male with PMH of HFrEF/NICM (EF 36% 9/2024) s/p CRT-D, CHB, CKD3, T2DM, HTN, HLD, GERD, BPH, hypothyroidism, V. Tach s/p ablation, A-fib (on Eliquis), CKD3a who presented to [facility] ED on 1/12/25 with midsternal chest discomfort and palpitations. For 2 weeks prior admission had been waking up with presyncope, severe palpitations. When EMS arrived, he was given nitroglycerin and took his own 324mg of aspirin. Per EMS, he was having runs of V tach x3 on the monitor. He follows with cardiology at [facility]. recently discharged on 12/28/24 after being admitted for HFrEF exacerbation. BNP on admission was 5230. He had stop taking his Lasix PTA. He was admitted at [facility] 9/2024 with ICD firing at which time Amiodarone was increased from 200 to 400 however patient opted against this at home. [facility] 9/2024 with nonobstructive CAD. Home medications include amiodarone, Eliquis, Coreg, Jardiance, Lasix, lisinopril, potassium, rosuvastatin. Labs significant for potassium 2.9, BUN 34, creatinine 1.72, BNP 1216, troponin 66, 59.     Review of Systems   Constitutional:  Positive for fatigue.   Respiratory:  Positive for shortness of breath.    Cardiovascular:  Positive for palpitations and leg swelling.   All other systems reviewed and are negative.      Last Recorded Vitals:  Vitals:    01/13/25 0414 01/13/25 0500 01/13/25 0540 01/13/25 0600   BP:  121/82  119/82   BP Location:       Patient Position:       Pulse:  72  72   Resp:  15  18   Temp:       TempSrc:       SpO2: 96% 95%  96%   Weight:   85.1 kg (187 lb 9.8 oz)    Height:           Last Labs:  CBC - 1/13/2025:  6:00 AM  5.2 13.8 154    44.3      CMP - 1/13/2025:  6:00 AM  8.2 7.7 21 --- 1.7   3.2 3.3 14 146      PTT - No results in last year.  _   _ _     Troponin I, High  Sensitivity   Date/Time Value Ref Range Status   01/12/2025 10:48 PM 59 (HH) 0 - 20 ng/L Final     Comment:     Previous result verified on 1/12/2025 2156 on specimen/case [id number] called with component [facility] for procedure Troponin I, High Sensitivity, Initial with value 66 ng/L.   01/12/2025 09:22 PM 66 (HH) 0 - 20 ng/L Final     BNP   Date/Time Value Ref Range Status   01/12/2025 09:22 PM 1,216 (H) 0 - 99 pg/mL Final     Hemoglobin A1C   Date/Time Value Ref Range Status   08/29/2024 06:58 PM 7.7 (H) 4.7 - 6.4 % Final     Comment:     Interpretation:     Standardized A1c  Good control or normal:  4-6% ( mg/dL avg)  Moderate control:        6.1-8.0% (120-180 mg/dL avg)  Poor control:            >8.0% (180 mg/dL avg)  With 4% as a baseline, each 1% increase = 30 mg/dL increase in average   glucose.  Taken from DCCT (Diabetes Control Complications Trial)   08/20/2024 09:31 AM 8.1 (H) 4.7 - 6.4 % Final     Comment:     Interpretation:     Standardized A1c  Good control or normal:  4-6% ( mg/dL avg)  Moderate control:        6.1-8.0% (120-180 mg/dL avg)  Poor control:            >8.0% (180 mg/dL avg)  With 4% as a baseline, each 1% increase = 30 mg/dL increase in average   glucose.  Taken from DCCT (Diabetes Control Complications Trial)      Last I/O:  I/O last 3 completed shifts:  In: 426.7 (5 mL/kg) [I.V.:426.7 (5 mL/kg)]  Out: 0 (0 mL/kg)   Weight: 85.1 kg     Past Cardiology Tests (Last 3 Years):  EKG:  No results found for this or any previous visit from the past 1095 days.    Echo:  9/3/2024 [facility]  CONCLUSIONS:   - Exam indication: Evaluation of known heart failure to guide therapy   - The left ventricle is normal in size. There is mild posterior left ventricular   hypertrophy. Left ventricular systolic function is moderately decreased. EF = 36 ±    5% (2D biplane) Left ventricular diastolic function was not evaluated due to   pacing.   - The right ventricle is dilated. Right ventricular systolic  "function is normal.   - The left atrial cavity is severely dilated.   - The right atrial cavity is mildly dilated.   - The visualized aorta is borderline dilated with a maximal dimension of 3.9 cm.   - There is moderate (2+) tricuspid valve regurgitation.   - Exam was compared with the prior  echocardiographic exam performed on   2/14/2020, LV systolic function has improved ( it was 20%)       Electronically signed by Jenny Wilson MD on 9/3/2024 at 4:55:52 PM     Ejection Fractions:  No results found for: \"EF\"  Cath:  ERVICE DATE: 9/3/2024   SERVICE TIME:  1:07 PM       CARDIOLOGIST: Amber Hurley MD   ATTENDING: Lorri Smith MD   PRIMARY CARE PHYSICIAN: Puja Barrera DO   REFERRING PROVIDER: Puja Barrera DO     [place] STUDY OF HEALTH and AGING SCALE:6=Moderately Frail     CARDIOVASCULAR INSTABILITY:No     PRE-PROCEDURE DIAGNOSIS:   Abnormal Stress Test     POST PROCEDURE DIAGNOSIS:   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 Moderate sedation consisting of continuous ECG,   pulse oximetry and cardiopulmonary monitoring was performed by   the Cardiology Nurse, overseen by supervising physician, for an   intra-service time of 0 hr. 20 min.     Sedative Medications:   Drug: Versed   Dose: 1 mg   Route: IV     Drug: Fentanyl   Dose: 25 mcg   Route: IV   PRIORITY AT TIME OF PROCEDURE: Elective     SITE OF ENTRY: Radial:Right Radial     CONTRAST: Omnipaque 350 mg Iodine/mL (iohexol injection,   solution): 25 mL     ADDITIONAL PROCEDURES     CORONARY ANGIOGRAPHY:   The patient was taken to the cardiac cath lab where the entry   site was prepped and draped in a sterile manner. Under local   anesthesic with 2% Lidocaine, the vessel was cannulated with   Seldinger's technique using an arterial needle and a 6F sheath   was " introduced.     Selective injections were made in the left and right coronary   arteries and various right, left and oblique views were obtained.       ADDITIONAL PROCEDURES     LEFT MAIN TRUNK: No Stenosis     LEFT ANTERIOR DESCENDIN% Stenosis     Location: Proximal      DIAGONAL #1: No Stenosis   DIAGONAL #2: No Stenosis     LEFT CIRCUMFLEX: 20% Stenosis    Location: Proximal     MARGINAL #1: No Stenosis     MARGINAL #2: No Stenosis     MARGINAL #3: No Stenosis     DOMINANT: YES   POSTERIOR DESCENDIN% Stenosis    Location: Proximal      RIGHT CORONARY: No Stenosis     DOMINANT: NO     Hemodynamics:   LVEDP: 8 mm Hg.   LV-Ao gradient : 0 mm Hg.   LVEF: Not done. LEVEF available by nuclear image. + CKD.       The sheath was removed and hemostatsis was established using   manual pressure using wrist band. There was no bleeding at the   end of the procedure. The pt was returned to the recovery room in   a stable condition     ESTIMATED BLOOD LOSS: Less Than Minimal Unless Noted Here.     COMPLICATIONS: None     SPECIMENS: No specimens obtained unless noted here.     CONDITION: Stable     RECOMMENDATIONS: Follow protocol of post-op orders. Aggressive   modification of risk factors. [employer].   SIGNATURE: Amber Hurley MD PATIENT NAME: Valentin Rojas     Stress Test:  Nuclear Stress Test 2024  PATIENT:   Name: MR. VALENTIN ROJAS   MRN: [id number]   Age: 89 years   Gender: M     CONCLUSIONS:    1. SPECT Perfusion Study: Abnormal.    2. There is mild (<10%) ischemia in the territory of the LCX.    3. There is a small (<10%) fixed perfusion defect in the RCA territory.    4. Left ventricle is severely dilated. The left ventricle systolic   function is moderately decreased.    5. This is an intermediate risk scan.        LVEF % 34     Cardiac Imaging:  No results found for this or any previous visit from the past 1095 days.      Remote Device Evaluation    * Device type: CRT-D  "REMOTE TRANSMISSION  * Presenting Rhythm: AP/Bi-V paced  * Battery Status: Battery is at , 5.08 yrs.  * Arrhythmias since 11/26/24: None since recent DCCV in Nov 2024  * Lead Measurements: Capture thresholds, sensing, and lead impedances are appropriate.  * Other Diagnostics: RA pacing 91.1%. Bi-V pacing 96.5%.  * Follow Up: Continue with every 3 month remote transmissions and annual in-clinic visits.    NOTE TO PROVIDERS: \"Cardiac Implanted Devices\" Flowsheets contain detailed Programming and Evaluation data. Full Docket/PDF found below under \"Scanned Documents\".  Procedure Note    Gino Mobley MD - 12/26/2024  Formatting of this note might be different from the original.  Remote Device Evaluation    * Device type: CRT-D REMOTE TRANSMISSION  * Presenting Rhythm: AP/Bi-V paced  * Battery Status: Battery is at , 5.08 yrs.  * Arrhythmias since 11/26/24: None since recent DCCV in Nov 2024  * Lead Measurements: Capture thresholds, sensing, and lead impedances are appropriate.  * Other Diagnostics: RA pacing 91.1%. Bi-V pacing 96.5%.  * Follow Up: Continue with every 3 month remote transmissions and annual in-clinic visits.      Past Medical History:  He has no past medical history on file.    Past Surgical History:  He has no past surgical history on file.      Social History:  He reports that he has never smoked. He has never been exposed to tobacco smoke. He has never used smokeless tobacco. No history on file for alcohol use and drug use.    Family History:  No family history on file.     Allergies:  Patient has no known allergies.    Inpatient Medications:  Scheduled medications   Medication Dose Route Frequency    [Held by provider] amiodarone  200 mg oral Daily    carvedilol  25 mg oral TID    furosemide  60 mg oral Daily    insulin glargine  7 Units subcutaneous Nightly    insulin lispro  0-10 Units subcutaneous q4h    levothyroxine  75 mcg oral q AM    [Held by provider] lisinopril  10 mg oral Daily    " perflutren lipid microspheres  0.5-10 mL of dilution intravenous Once in imaging    perflutren protein A microsphere  0.5 mL intravenous Once in imaging    potassium chloride  20 mEq intravenous q2h    [START ON 1/14/2025] potassium chloride CR  20 mEq oral Daily    potassium chloride CR  40 mEq oral Once    sulfur hexafluoride microsphr  2 mL intravenous Once in imaging    tamsulosin  0.4 mg oral Daily     PRN medications   Medication    acetaminophen    dextrose    dextrose    glucagon    glucagon    heparin    oxyCODONE    oxyCODONE     Continuous Medications   Medication Dose Last Rate    amiodarone  0.5 mg/min 0.5 mg/min (01/13/25 0526)    heparin  0-4,500 Units/hr 1,500 Units/hr (01/13/25 0125)     Outpatient Medications:  Current Outpatient Medications   Medication Instructions    amiodarone (PACERONE) 200 mg, Daily    apixaban (ELIQUIS) 2.5 mg, 2 times daily    carvedilol (COREG) 25 mg, oral, 3 times daily    empagliflozin (JARDIANCE) 25 mg, Daily    furosemide (LASIX) 60 mg, Daily    glipiZIDE XL (GLUCOTROL XL) 5 mg, Daily    lansoprazole (PREVACID) 15 mg, oral, Daily PRN, Do not crush or chew.    Lantus U-100 Insulin 15 Units, Nightly    levothyroxine (SYNTHROID, LEVOXYL) 75 mcg, Daily RT    lisinopril 10 mg, Daily    magnesium glycinate 100 mg magnesium capsule 2 capsules, oral, Daily    magnesium glycinate 200 mg, Daily RT    oxyCODONE-acetaminophen (Percocet) 5-325 mg tablet 1 tablet, oral, Every 8 hours PRN    potassium chloride CR 20 mEq ER tablet 20 mEq, Daily    rosuvastatin (CRESTOR) 10 mg, oral, Daily    tamsulosin (FLOMAX) 0.4 mg, Nightly     Physical Exam  Vitals reviewed.   Constitutional:       Appearance: Normal appearance. He is normal weight.   HENT:      Head: Normocephalic and atraumatic.   Neck:      Vascular: JVD present. No carotid bruit.   Cardiovascular:      Rate and Rhythm: Normal rate and regular rhythm.      Pulses: Normal pulses.      Heart sounds: Normal heart sounds.    Pulmonary:      Effort: Pulmonary effort is normal.      Breath sounds: Normal breath sounds.   Abdominal:      General: Abdomen is flat. Bowel sounds are normal.      Palpations: Abdomen is soft.   Musculoskeletal:      Right lower le+ Pitting Edema present.      Left lower le+ Pitting Edema present.   Skin:     General: Skin is warm and dry.   Neurological:      General: No focal deficit present.      Mental Status: He is alert and oriented to person, place, and time. Mental status is at baseline.   Psychiatric:         Mood and Affect: Mood normal.         Behavior: Behavior normal.         Assessment/Plan   Assessment  89 y.o. male with PMH of HFrEF/NICM (EF 36% 2024) s/p CRT-D, CHB, CKD3, T2DM, HTN, HLD, GERD, BPH, hypothyroidism, VT s/p ablation, A-fib (on Eliquis), CKD3a who presented to [facility] ED on 25 with midsternal chest discomfort and palpitations. Found to have nonsustained VT. [facility] 2024 with nonobstructive CAD. Home medications include amiodarone, Eliquis, Coreg, Jardiance, Lasix, lisinopril, potassium, rosuvastatin. Labs significant for potassium 2.9, BUN 34, creatinine 1.72, BNP 1216, troponin 66, 59.     HFrEF/NICM (EF 36% 2024) s/p CRT-D  Nonsustained VT   Persistent atrial fibrillation  Hypokalemia  HTN  HLD    Plan  -TTE  -Device check  -EP evaluation  -Continue amiodarone gtt  -Eliquis on hold, continue heparin gtt  -Continue Coreg 25 mg TID, Lasix 60 mg daily, lisinopril 10 mg daily  -Begin spironolactone 25 mg daily  -Keep Mag >2, K >4  -Will follow    Peripheral IV 25 18 G Distal;Right Forearm (Active)   Site Assessment Clean;Dry;Intact 25 0255   Dressing Type Transparent 25 0255   Line Status Flushed 25 0255   Dressing Status Clean;Dry;Occlusive 25 0255   Number of days: 1       Peripheral IV 25 20 G Proximal;Right;Anterior Forearm (Active)   Site Assessment Clean;Dry;Intact 25 0611   Dressing Type Transparent 25 0611    Line Status Flushed 01/13/25 0611   Dressing Status Clean;Dry;Occlusive 01/13/25 0611   Number of days: 0       Code Status:  Full Code    I spent minutes in the professional and overall care of this patient.        Sarath Bourne, LAUREN-CNP

## 2025-01-13 NOTE — PROGRESS NOTES
"Medical Intensive Care - Daily Progress Note   Subjective    Valentin Rojas is an 89 year old male with PMHx of HFrEF/NICM (EF 36% 9/2024) s/p Medtronic ICD placement, CHB s/p CRT, CKD3, T2DM, HTN, HLD, GERD, BPH, hypothyroidism, V. Tach s/p ablation, A-fib (on Eliquis) who presented to [facility] ED on 1/12/25 with midsternal chest discomfort. 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     Interval History:  Patient was seen and examined at bedside. No acute events overnight. Patient denies new or worsening symptoms.     Meds    Scheduled medications  [Held by provider] amiodarone, 200 mg, oral, Daily  carvedilol, 25 mg, oral, TID  furosemide, 60 mg, oral, Daily  insulin glargine, 7 Units, subcutaneous, Nightly  insulin lispro, 0-10 Units, subcutaneous, q4h  levothyroxine, 75 mcg, oral, q AM  [Held by provider] lisinopril, 10 mg, oral, Daily  perflutren lipid microspheres, 0.5-10 mL of dilution, intravenous, Once in imaging  perflutren protein A microsphere, 0.5 mL, intravenous, Once in imaging  potassium chloride, 20 mEq, intravenous, q2h  [START ON 1/14/2025] potassium chloride CR, 20 mEq, oral, Daily  potassium chloride CR, 40 mEq, oral, Once  sulfur hexafluoride microsphr, 2 mL, intravenous, Once in imaging  tamsulosin, 0.4 mg, oral, Daily      Continuous medications  amiodarone, 0.5 mg/min, Last Rate: 0.5 mg/min (01/13/25 0526)  heparin, 0-4,500 Units/hr, Last Rate: 1,500 Units/hr (01/13/25 0125)      PRN medications  PRN medications: acetaminophen, dextrose, dextrose, glucagon, glucagon, heparin, oxyCODONE, oxyCODONE     Objective    Blood pressure 119/82, pulse 72, temperature 36.5 °C (97.7 °F), temperature source Temporal, resp. rate 18, height 1.803 m (5' 11\"), weight 85.1 kg (187 lb 9.8 oz), SpO2 96%.     Physical Exam  Constitutional:       General: He is not in acute distress.  Cardiovascular:      Rate and Rhythm: Normal rate.      Heart sounds:      No friction " "rub. No gallop.   Pulmonary:      Effort: Pulmonary effort is normal. No respiratory distress.      Breath sounds: No stridor.   Abdominal:      Palpations: Abdomen is soft.   Neurological:      Mental Status: He is alert and oriented to person, place, and time.            Intake/Output Summary (Last 24 hours) at 1/13/2025 0745  Last data filed at 1/13/2025 0526  Gross per 24 hour   Intake 426.65 ml   Output 0 ml   Net 426.65 ml     Labs:   Results from last 72 hours   Lab Units 01/13/25  0600 01/12/25  2122   SODIUM mmol/L 142 139   POTASSIUM mmol/L 2.9* 3.2*   CHLORIDE mmol/L 103 97*   CO2 mmol/L 30 30   BUN mg/dL 34* 35*   CREATININE mg/dL 1.72* 1.93*   GLUCOSE mg/dL 220* 240*   CALCIUM mg/dL 8.2* 8.8   ANION GAP mmol/L 12 15   EGFR mL/min/1.73m*2 38* 33*   PHOSPHORUS mg/dL 3.2 3.8      Results from last 72 hours   Lab Units 01/13/25  0600 01/12/25  2122   WBC AUTO x10*3/uL 5.2 6.1   HEMOGLOBIN g/dL 13.8 14.3   HEMATOCRIT % 44.3 45.7   PLATELETS AUTO x10*3/uL 154 174   NEUTROS PCT AUTO %  --  70.7   LYMPHS PCT AUTO %  --  21.6   MONOS PCT AUTO %  --  6.9   EOS PCT AUTO %  --  0.3                 Micro/ID:     No results found for: \"URINECULTURE\", \"BLOODCULT\", \"CSFCULTSMEAR\"    Summary of key imaging results from the last 24 hours  Transthoracic Echo (TTE) Complete    Result Date: 1/13/2025   [facility], [address] [address]               Tel [phone] and Fax [phone] TRANSTHORACIC ECHOCARDIOGRAM REPORT  Patient Name:       DAVID Roegrs Physician:    [id number] Nicole Butler MD Study Date:         1/13/2025           Ordering Provider:    [id number] FERMÍN KRAFT MRN/PID:            [id number]            Fellow: Accession#:         [id number]        Nurse:                " Dena Adams RN Date of Birth/Age:  1935 / 89      Sonographer:          Edwige roper RDCS Gender assigned at  M                   Additional Staff: Birth: Height:             180.34 cm           Admit Date:           1/12/2025 Weight:             84.82 kg            Admission Status:     Inpatient -                                                               Routine BSA / BMI:          2.05 m2 / 26.08     Encounter#:           [id number]                     kg/m2 Blood Pressure:     119/82 mmHg         Department Location:  [facility] Non                                                               Invasive Study Type:    TRANSTHORACIC ECHO (TTE) COMPLETE Diagnosis/ICD: Supraventricular tachycardia-I47.1; Ventricular tachycardia,                other-I47.29 Indication:    SVT, VT CPT Code:      Echo Complete w Full [id number] Patient History: Pertinent         V-tach /w ablation, NICM, CKD, DM, Chest Pain, A-Fib and History:          Dyspnea. Study Detail: The following Echo studies were performed: 2D, M-Mode, Doppler and               color flow. Definity used as a contrast agent for endocardial               border definition. Total contrast used for this procedure was 1.0               mL via IV push. Unable to obtain subcostal view. Patient has a               defibrillator. Patient has a pacemaker.               The patient was awake.  PHYSICIAN INTERPRETATION: Left Ventricle: The left ventricular systolic function is severely decreased, with a visually estimated ejection fraction of 20-25%. There is mild eccentric left ventricular hypertrophy. There is global hypokinesis of the left ventricle with minor regional variations. The left ventricular cavity size is moderately dilated. There is mildly increased septal and normal posterior left ventricular wall thickness. Left ventricular diastolic filling cannot be determined, due to right  ventricular pacing. Left Atrium: The left atrium is severely dilated. Right Ventricle: The right ventricle is severely enlarged. There is reduced right ventricular systolic function. A device is visualized in the right ventricle. Right Atrium: The right atrium is moderately dilated. There is a device visualized in the right atrium. Aortic Valve: The aortic valve is trileaflet. There is mild aortic valve cusp calcification. The aortic valve dimensionless index is 0.67. There is no evidence of aortic valve regurgitation. The peak instantaneous gradient of the aortic valve is 2 mmHg. The mean gradient of the aortic valve is 1 mmHg. Mitral Valve: The mitral valve is mild to moderately thickened. There is mild to moderate mitral valve regurgitation. Tricuspid Valve: The tricuspid valve is structurally normal. There is mild to moderate tricuspid regurgitation. The Doppler estimated RVSP is within normal limits at 23.2 mmHg. Pulmonic Valve: The pulmonic valve is structurally normal. There is moderate pulmonic valve regurgitation. Pericardium: There is no pericardial effusion noted. Aorta: The aortic root is normal.  CONCLUSIONS:  1. The left ventricular systolic function is severely decreased, with a visually estimated ejection fraction of 20-25%.  2. There is global hypokinesis of the left ventricle with minor regional variations.  3. Left ventricular cavity size is moderately dilated.  4. There is reduced right ventricular systolic function.  5. Severely enlarged right ventricle.  6. The left atrium is severely dilated.  7. The right atrium is moderately dilated.  8. Mild to moderate mitral valve regurgitation.  9. Mild to moderate tricuspid regurgitation visualized. 10. Right ventricular systolic pressure is within normal limits. 11. There is moderate pulmonic valve regurgitation. QUANTITATIVE DATA SUMMARY:  2D MEASUREMENTS:          Normal Ranges: IVSd:            1.21 cm  (0.6-1.1cm) LVPWd:           0.98 cm   (0.6-1.1cm) LVIDd:           5.52 cm  (3.9-5.9cm) LVIDs:           5.13 cm LV Mass Index:   118 g/m2 LVEDV Index:     74 ml/m2 LV % FS          7.2 %  LA VOLUME:                    Normal Ranges: LA Vol A4C:        85.0 ml    (22+/-6mL/m2) LA Vol A2C:        107.0 ml LA Vol BP:         96.9 ml LA Vol Index A4C:  41.5 ml/m2 LA Vol Index A2C:  52.2 ml/m2 LA Vol Index BP:   47.3 ml/m2 LA Area A4C:       25.1 cm2 LA Area A2C:       28.6 cm2 LA Major Axis A4C: 6.3 cm LA Major Axis A2C: 6.5 cm LA Volume Index:   46.9 ml/m2 LA Vol A4C:        76.9 ml LA Vol A2C:        102.8 ml LA Vol Index BSA:  43.8 ml/m2  RA VOLUME BY A/L METHOD:          Normal Ranges: RA Area A4C:             24.4 cm2  AORTA MEASUREMENTS:         Normal Ranges: Ao Sinus, d:        2.80 cm (2.1-3.5cm) Asc Ao, d:          3.60 cm (2.1-3.4cm)  LV SYSTOLIC FUNCTION BY 2D PLANIMETRY (MOD):                      Normal Ranges: EF-A4C View:    19 % (>=55%) EF-A2C View:    22 % EF-Biplane:     21 % EF-Visual:      23 % LV EF Reported: 23 %  AORTIC VALVE:                     Normal Ranges: AoV Vmax:                0.68 m/s (<=1.7m/s) AoV Peak P.8 mmHg (<20mmHg) AoV Mean P.2 mmHg (1.7-11.5mmHg) LVOT Max Audie:            0.40 m/s (<=1.1m/s) AoV VTI:                 10.28 cm (18-25cm) LVOT VTI:                6.92 cm LVOT Diameter:           2.31 cm  (1.8-2.4cm) AoV Area, VTI:           2.82 cm2 (2.5-5.5cm2) AoV Area,Vmax:           2.48 cm2 (2.5-4.5cm2) AoV Dimensionless Index: 0.67  RIGHT VENTRICLE: RV Basal 5.10 cm RV Mid   4.20 cm RV Major 8.8 cm TAPSE:   8.0 mm RV s'    0.08 m/s  TRICUSPID VALVE/RVSP:          Normal Ranges: Peak TR Velocity:     2.25 m/s RV Syst Pressure:     23 mmHg  (< 30mmHg)  PULMONIC VALVE:          Normal Ranges: PV Max Audie:     0.5 m/s  (0.6-0.9m/s) PV Max P.0 mmHg  AORTA: Asc Ao Diam 3.56 cm  [id number] Nicole Butler MD Electronically signed on 2025 at 11:46:18 AM  ** Final **     ECG 12  Lead    Result Date: 1/13/2025  Ventricular-paced rhythm Biventricular pacemaker detected Abnormal ECG When compared with ECG of 12-JAN-2025 21:03, (unconfirmed) Vent. rate has decreased BY  40 BPM    ECG 12 lead    Result Date: 1/13/2025  Ventricular-paced rhythm Biventricular pacemaker detected Abnormal ECG When compared with ECG of 29-NOV-2011 15:44, Electronic ventricular pacemaker has replaced Sinus rhythm Vent. rate has increased BY  56 BPM    XR chest 1 view    Result Date: 1/12/2025  Interpreted By:  Piero Lombardo, STUDY: XR CHEST 1 VIEW;  1/12/2025 9:26 pm   INDICATION: Signs/Symptoms:cp.     COMPARISON: None.   ACCESSION NUMBER(S): [id number]   ORDERING CLINICIAN: FERMÍN VAZQUEZ   FINDINGS: AP radiograph of the chest was provided.   Multi lead left subclavian AICD/pacemaker is in place.   CARDIOMEDIASTINAL SILHOUETTE: Cardiomediastinal silhouette is enlarged.   LUNGS: There are low lung volumes with bronchovascular crowding and pulmonary vascular congestion. Trace left-sided pleural effusion/atelectasis or consolidation are not excluded. No consolidation or sizable effusion are present in the right lung.   ABDOMEN: No remarkable upper abdominal findings.   BONES: No acute osseous changes.       1.  Low lung volumes with bronchovascular crowding and pulmonary vascular congestion. Trace left-sided pleural effusion with the associated atelectasis/consolidation is not excluded.       MACRO: None   Signed by: Piero Lombardo 1/12/2025 10:11 PM Dictation workstation:   [id number]     Assessment and Plan     Assessment:Valentin Rojas is an 89 year old male with PMHx of HFrEF/NICM (EF 36% 9/2024) s/p Medtronic ICD placement, CHB s/p CRT, CKD3, T2DM, HTN, HLD, GERD, BPH, hypothyroidism, V. Tach s/p ablation, A-fib (on Eliquis) who presented to [facility] ED on 1/12/25 with midsternal chest discomfort. Admitted to ICU for further management of recurrent V. Tach/SVT on Amiodarone  drip.    Summary for 01/13/25  :  Cardiology following: TTE , device check, EP eval, continue amiodarone gtt, start Aldactone 25 Mg daily  Start cardiac CCD 75g diet  SSI #2, resume 15u Lantus nightly    Plan:  NEUROLOGY/PSYCH:  Dx:  NUNO    CARDIOVASCULAR:  Dx:  Recurrent V. Tach/SVT   Elevated Troponin 2/2 above   HFrEF/NICM s/p ICD placement (EF 36% in 9/2024)  CHB s/p CRT   S/p prior ablations for V. tach  Management:  Continue amiodarone drip   Cardiology following  TTE  Continue Lasix 60 Mg daily, Coreg 25 Mg 3 times daily,  Holding Jardiance 25 Mg daily  Maintain and replete electrolytes, Mg > 2 and K > 4    PULMONARY:      Dx:  NUNO    RENAL/GENITOURINARY:  Dx:  Hypokalemia  Management:  Replete K  Start Aldactone 25 Mg daily    GASTROENTEROLOGY:  Dx:  GERD  Management:  Stable, no home medication    ENDOCRINOLOGY:  Dx:  Hypothyroidism  T2DM  HbA1c 7.7% Aug 2024   Home Lantus 22u nightly; 15u last admission  Management:  Continue Lantus 15U  SSI #2, resume diet  Hold glipizide 5 Mg daily  Continue home levothyroxine 75 mcg daily    HEMATOLOGY:  Dx:  NUNO    MUSCULOSKELETAL/SKIN:  Dx:  NUNO    INFECTIOUS DISEASE:  Dx:  NUNO    ICU Check List     FEN  Fluids: As needed  Electrolytes: As needed  Nutrition: Cardiac CCD 75g  Prophylaxis:  DVT ppx: Heparin GTT  GI ppx: -  Hardware:         External Urinary Catheter Male (Active)   Placement Date/Time: 01/13/25 0200   Hand Hygiene Completed: Yes  External Catheter Type: Male   Number of days: 0       Social:  Code: Full Code    HPOA:   Dena Rojas (Spouse)  [phone] (Work Phone)   Disposition: Admitted to ICU for further management and continued monitoring of recurrent V. Tach/SVT on Amiodarone & heparin drip. Cardiology following.  Patient stable for transfer to [facility]        Miguel Garcia DO   01/13/25 at 7:45 AM     Disclaimer: Documentation completed with the information available at the time of input. The times in the chart may not be reflective of  actual patient care times, interventions, or procedures. Documentation occurs after the physical care of the patient.

## 2025-01-13 NOTE — ED PROCEDURE NOTE
Procedure  Critical Care    Performed by: Shelbie Swain DO  Authorized by: Shelbie Swain DO    Critical care provider statement:     Critical care time (minutes):  37    Critical care time was exclusive of:  Separately billable procedures and treating other patients    Critical care was necessary to treat or prevent imminent or life-threatening deterioration of the following conditions:  Cardiac failure    Critical care was time spent personally by me on the following activities:  Blood draw for specimens, ordering and performing treatments and interventions, development of treatment plan with patient or surrogate, ordering and review of laboratory studies, ordering and review of radiographic studies, discussions with consultants, discussions with primary provider, pulse oximetry, re-evaluation of patient's condition, evaluation of patient's response to treatment, examination of patient, review of old charts, gastric intubation and obtaining history from patient or surrogate    Care discussed with: admitting provider                 Shelbie Swain DO  01/12/25 1249

## 2025-01-13 NOTE — PROGRESS NOTES
01/13/25 1531   Discharge Planning   Living Arrangements Spouse/significant other   Support Systems Spouse/significant other;Family members;Friends/neighbors   Assistance Needed Patient is A&O X3, on room air at baseline, does not uses a CPAP/BIPAP at home, is not currently active with any community/home care agencies, is independent with ADLs, drives and uses a walker for ambulation at home. Patient denies further needs upon discharge.   Type of Residence Private residence   Number of Stairs to Enter Residence 4   Number of Stairs Within Residence 0   Do you have animals or pets at home? No   Who is requesting discharge planning? Provider   Home or Post Acute Services None   Does the patient need discharge transport arranged? No   Financial Resource Strain   How hard is it for you to pay for the very basics like food, housing, medical care, and heating? Not hard   Housing Stability   In the last 12 months, was there a time when you were not able to pay the mortgage or rent on time? N   Transportation Needs   In the past 12 months, has lack of transportation kept you from medical appointments or from getting medications? no   In the past 12 months, has lack of transportation kept you from meetings, work, or from getting things needed for daily living? No   Patient Choice   Provider Choice list and CMS website (https://medicare.gov/care-compare#search) for post-acute Quality and Resource Measure Data were provided and reviewed with: Patient   Patient / Family choosing to utilize agency / facility established prior to hospitalization No

## 2025-01-13 NOTE — ED PROVIDER NOTES
HPI   Chief Complaint   Patient presents with    Chest Pain     Pt states that prior to calling 911 he started to experience midsternal chest pain. Pt has a medtronic pacemaker defibrillator in place. When EMS arrived, pt was given a nitroglycerin and he took his own 324mg of aspirin. He states it is just discomfort. Per EMS patient was having runs of VTACH x3 on their monitor.        89-year-old male brought from home for chief complaint of chest discomfort as well as arrhythmia.  Patient states he has a pacemaker defibrillator and the company called him telling him to call 911 because he was going into a tachyarrhythmia and SVT.  Patient states he only felt a discomfort he did not feel any palpitations or true chest pain.  He denies any other symptoms or complaints such as nausea vomiting sweating dizziness or lightheadedness with this.  He just got out of [facility] a week and a half ago for shortness of breath and heart failure.  He said he is been doing well.  He wears home oxygen 3 L.              Patient History   History reviewed. No pertinent past medical history.  History reviewed. No pertinent surgical history.  No family history on file.  Social History     Tobacco Use    Smoking status: Not on file    Smokeless tobacco: Not on file   Substance Use Topics    Alcohol use: Not on file    Drug use: Not on file       Physical Exam   ED Triage Vitals   Temperature Heart Rate Respirations BP   01/12/25 2115 01/12/25 2115 01/12/25 2115 01/12/25 2115   36.2 °C (97.2 °F) (!) 116 19 (!) 127/95      Pulse Ox Temp src Heart Rate Source Patient Position   01/12/25 2115 -- 01/12/25 2151 01/12/25 2151   95 %  Monitor Sitting      BP Location FiO2 (%)     01/12/25 2151 --     Right arm        Physical Exam  Vitals and nursing note reviewed.   Constitutional:       Appearance: Normal appearance.   HENT:      Head: Normocephalic and atraumatic.      Nose: Nose normal.      Mouth/Throat:      Mouth: Mucous membranes are moist.    Eyes:      Extraocular Movements: Extraocular movements intact.      Pupils: Pupils are equal, round, and reactive to light.   Cardiovascular:      Rate and Rhythm: Tachycardia present. Rhythm irregular.   Pulmonary:      Effort: Pulmonary effort is normal.      Breath sounds: Normal breath sounds.   Abdominal:      General: Abdomen is flat.      Palpations: Abdomen is soft.   Musculoskeletal:         General: Normal range of motion.      Cervical back: Normal range of motion.   Skin:     General: Skin is warm and dry.      Capillary Refill: Capillary refill takes less than 2 seconds.   Neurological:      General: No focal deficit present.      Mental Status: He is alert.   Psychiatric:         Mood and Affect: Mood normal.           ED Course & MDM   ED Course as of 01/12/25 2256   Sun Jan 12, 2025 2225 Troponin I, High Sensitivity(!!): 66 [TP]   2225 BNP(!): 1,216 [TP]   2225 POTASSIUM(!): 3.2 [TP]      ED Course User Index  [TP] Shelbie Swain, DO         Diagnoses as of 01/12/25 2256   SVT (supraventricular tachycardia) (CMS-HCC)   Ventricular tachyarrhythmia (Multi)                 No data recorded     [place] Coma Scale Score: 15 (01/12/25 2143 : Peyton Mtz RN)                           Medical Decision Making  Medical Decision Making: Patient was worked up EKG interpreted by me shows a heart rate of 110 ventricular-paced rhythm but he is in A-fib with RVR AK intervals not calculable QTc 611 ms.  Normal axis.  Troponin is 66 potassium 3.2 which was replaced p.o.  BNP is 1216.  It was over 5000 at [facility] couple weeks ago.  He is states we could not a disease so his BUN and creatinine are stable.  Chest x-ray shows pulmonary vascular congestion.  Amiodarone bolus and drip was started immediately to prevent ventricular tachyarrhythmia.  Medtronic pacer was interrogated and he has had over 107 episodes of some type of arrhythmia.  We will hospitalize in the ICU for further management and  treatment.  The patient was placed on the pacer pads in case of emergency.  Differential includes V. tach V-fib SVT  Consider cardioversion if needed  [unfilled]     Shelbie Swain D.O.  Emergency Medicine          Procedure  Procedures     Shelbie Swain,   01/12/25 7797

## 2025-01-13 NOTE — H&P
Critical Care Medicine - History and Physical Note      Patient: Valentin Rojas, Age: 89 y.o., SEX: male , MRN:[id number], ROOM:14/[facility],  Code: No Order   Admitted On: 1/12/2025   Admitting Dx: No admission diagnoses are documented for this encounter.  PCP: Puja Barrera DO        Attending: Shelbie Carpenter*        Chief Complaint   Patient: Valentin Rojas is a 89 y.o. male  who presented to the hospital for   Chief Complaint   Patient presents with   • Chest Pain     Pt states that prior to calling 911 he started to experience midsternal chest pain. Pt has a medtronic pacemaker defibrillator in place. When EMS arrived, pt was given a nitroglycerin and he took his own 324mg of aspirin. He states it is just discomfort. Per EMS patient was having runs of VTACH x3 on their monitor.      HPI   Valentin Rojas is an 89 year old male with PMHx of HFrEF/NICM (EF 36% 9/2024) s/p Medtronic ICD placement, CHB s/p CRT, CKD3, T2DM, HTN, HLD, GERD, BPH, hypothyroidism, V. Tach s/p ablation, A-fib (on Eliquis) who presented to [facility] ED on 1/12/25 with midsternal chest discomfort. When EMS arrived, he was given nitroglycerin and took his own 324mg of aspirin. Per EMS, he was having runs of V tach x3 on the monitor. Per patient's son at bedside, he states that he spoke to his father on the phone this morning around 9am and he sounded more short of breath than normal. Patient explained that this is around the time he started to generally feel unwell. He felt like his heart was fluttering the entire day and felt as if he was going to pass out, this is all very similar to how he has felt during his previous V. Tach episodes. Patient states that the chest discomfort started around 6pm today. It was non-radiating in nature and he states that the pain would come and go. It is slightly reproducible on exam. He does have history of V. Tach with ICD firing and states he has had about 2 or 3 ablations in the past,  thinks his last one was roughly 8-9 years ago. 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     Of note, patient was recently discharged on 12/28/24 after being admitted for HFrEF exacerbation. BNP on admission was 5230. His Lasix was increased to 60mg once daily. He states that due to recent dizziness he has stopped taking some of his medications but can not recall which ones. States he still takes all of his cardiac medications but that he has not taken Lasix for the past day or so. He denies recent weight gain, feels that his lower extremity edema and shortness of breath are at baseline.    On arrival to the ICU, he endorsed chest discomfort but denied SOB, abdominal pain, n/v, diarrhea/constipation, or urinary symptoms.    12 Point ROS negative unless otherwise specified above.     ED COURSE:   VS: Temp 97.2 F, , RR 19, /95, SpO2 95% on 2L NC oxygen for comfort     Labs:  - CBC: Unremarkable   - CMP: Glucose 240, K 3.2, Cl 97, BUN 35, Cr 1.93, GFR 33, , T bili 1.7   - BNP: 1,216   - Troponin: 66, 59     Micro:  - None     Imaging:  - Chest x-ray: Low lung volumes with bronchovascular crowding and pulmonary vascular congestion. Trace left-sided pleural effusion with the associated atelectasis/consolidation is not excluded.   - EKG: A. Fib with RVR, ventricular paced rhythm,  bpm, Qtc 611     Interventions: Medtronic ICD interrogated and showed that he has had over 107 episodes of some type of arrhythmia. He was bolused with amiodarone 150mg and started on amiodarone drip. Given Kcl 40mEq. Pacer pads placed and admitted to ICU for further management     Social History:  - Coming from home   - Tobacco: None  - Alcohol: None  - Illicit Drug: None    HealthCare Providers:  - PCP: Puja Barrera,      Code Status: No Order     Emergency contact: Extended Emergency Contact Information  Primary Emergency  Contact: Dena Rojas  Home Phone: [phone]  Work Phone: [phone]  Relation: Spouse   needed? No  Secondary Emergency Contact: WEI ROJAS  Mobile Phone: [phone]  Relation: Son  Preferred language: English   needed? No       Past Medical History   History reviewed. No pertinent past medical history.     Surgical History   History reviewed. No pertinent surgical history.    Family History   No family history on file.    Social History     Social History     Socioeconomic History   • Marital status:      Spouse name: Not on file   • Number of children: Not on file   • Years of education: Not on file   • Highest education level: Not on file   Occupational History   • Not on file   Tobacco Use   • Smoking status: Not on file   • Smokeless tobacco: Not on file   Substance and Sexual Activity   • Alcohol use: Not on file   • Drug use: Not on file   • Sexual activity: Not on file   Other Topics Concern   • Not on file   Social History Narrative   • Not on file     Social Drivers of Health     Financial Resource Strain: Low Risk  (9/13/2022)    Received from [facility]    Overall Financial Resource Strain (CARDIA)    • Difficulty of Paying Living Expenses: Not hard at all   Food Insecurity: Food Insecurity Present (12/27/2024)    Received from [facility]    Hunger Vital Sign    • Worried About Running Out of Food in the Last Year: Sometimes true    • Ran Out of Food in the Last Year: Sometimes true   Transportation Needs: No Transportation Needs (12/27/2024)    Received from [facility]    PRAPARE - Transportation    • Lack of Transportation (Medical): No    • Lack of Transportation (Non-Medical): No   Physical Activity: Not on file   Stress: Not on file   Social Connections: Not on file   Intimate Partner Violence: Not on file   Housing Stability: Low Risk  (12/27/2024)    Received from [facility]    Housing Stability Vital Sign    • Unable to Pay for  Housing in the Last Year: No    • Number of Times Moved in the Last Year: 0    • Homeless in the Last Year: No       Tobacco Use: Medium Risk (1/9/2025)    Received from [facility]    Patient History    • Smoking Tobacco Use: Former    • Smokeless Tobacco Use: Former    • Passive Exposure: Not on file        Social History     Substance and Sexual Activity   Alcohol Use None        Allergies   No Known Allergies       Meds    Scheduled medications  [START ON 1/13/2025] potassium chloride CR, 40 mEq, oral, Daily      Continuous medications  amiodarone, 1 mg/min, Last Rate: 1 mg/min (01/12/25 2126)      PRN medications       Objective      Objective    Physical Exam  Constitutional:       General: He is not in acute distress.     Appearance: Normal appearance.   Cardiovascular:      Rate and Rhythm: Tachycardia present. Rhythm irregular.      Heart sounds: Normal heart sounds. No murmur heard.     No friction rub. No gallop.   Pulmonary:      Effort: Pulmonary effort is normal. No respiratory distress.      Breath sounds: Normal breath sounds. No wheezing, rhonchi or rales.      Comments: On NC oxygen   Abdominal:      General: Abdomen is flat. Bowel sounds are normal. There is no distension.      Palpations: Abdomen is soft.      Tenderness: There is no abdominal tenderness.   Musculoskeletal:         General: No swelling or tenderness. Normal range of motion.      Right lower leg: Edema present.      Left lower leg: Edema present.   Skin:     General: Skin is warm and dry.      Findings: No erythema or rash.   Neurological:      General: No focal deficit present.      Mental Status: He is alert and oriented to person, place, and time. Mental status is at baseline.   Psychiatric:         Mood and Affect: Mood normal.         Behavior: Behavior normal.          Visit Vitals  BP (!) 122/92 (BP Location: Right arm, Patient Position: Sitting)   Pulse (!) 110   Temp 36.2 °C (97.2 °F)   Resp 16   Ht 1.829 m (6')   Wt  "83.5 kg (184 lb)   SpO2 (!) 93%   BMI 24.95 kg/m²   BSA 2.06 m²        No intake or output data in the 24 hours ending 01/12/25 2244          Labs:   Results from last 72 hours   Lab Units 01/12/25  2122   SODIUM mmol/L 139   POTASSIUM mmol/L 3.2*   CHLORIDE mmol/L 97*   CO2 mmol/L 30   BUN mg/dL 35*   CREATININE mg/dL 1.93*   GLUCOSE mg/dL 240*   CALCIUM mg/dL 8.8   ANION GAP mmol/L 15   EGFR mL/min/1.73m*2 33*      Results from last 72 hours   Lab Units 01/12/25  2122   WBC AUTO x10*3/uL 6.1   HEMOGLOBIN g/dL 14.3   HEMATOCRIT % 45.7   PLATELETS AUTO x10*3/uL 174   NEUTROS PCT AUTO % 70.7   LYMPHS PCT AUTO % 21.6   MONOS PCT AUTO % 6.9   EOS PCT AUTO % 0.3      Lab Results   Component Value Date    CALCIUM 8.8 01/12/2025      No results found for: \"CRP\"     Micro/ID:   No results found for the last 90 days.    No results found for: \"URINECULTURE\", \"BLOODCULT\", \"CSFCULTSMEAR\"                 No lab exists for component: \"AGALPCRNB\"     Images    XR chest 1 view  Narrative: Interpreted By:  Piero Lombardo,   STUDY:  XR CHEST 1 VIEW;  1/12/2025 9:26 pm      INDICATION:  Signs/Symptoms:cp.          COMPARISON:  None.      ACCESSION NUMBER(S):  [id number]      ORDERING CLINICIAN:  FERMÍN VAZQUEZ      FINDINGS:  AP radiograph of the chest was provided.      Multi lead left subclavian AICD/pacemaker is in place.      CARDIOMEDIASTINAL SILHOUETTE:  Cardiomediastinal silhouette is enlarged.      LUNGS:  There are low lung volumes with bronchovascular crowding and  pulmonary vascular congestion. Trace left-sided pleural  effusion/atelectasis or consolidation are not excluded. No  consolidation or sizable effusion are present in the right lung.      ABDOMEN:  No remarkable upper abdominal findings.      BONES:  No acute osseous changes.      Impression: 1.  Low lung volumes with bronchovascular crowding and pulmonary  vascular congestion. Trace left-sided pleural effusion with the  associated " atelectasis/consolidation is not excluded.              MACRO:  None      Signed by: Piero Lombardo 1/12/2025 10:11 PM  Dictation workstation:   [id number]       No results found for this or any previous visit (from the past 4464 hours).   No results found for this or any previous visit (from the past 4464 hours).       Assessment      Assessment and Plan    Valentin Rojas is an 89 year old male with PMHx of HFrEF/NICM (EF 36% 9/2024) s/p Medtronic ICD placement, CHB s/p CRT, CKD3, T2DM, HTN, HLD, GERD, BPH, hypothyroidism, V. Tach s/p ablation, A-fib (on Eliquis) who presented to [facility] ED on 1/12/25 with midsternal chest discomfort. 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     NEUROLOGY/ PSYCH:  - No active/acute issues     CARDIOVASCULAR:  # Recurrent V. Tach/SVT   # Elevated Troponin 2/2 above   # HFrEF/NICM s/p ICD placement (EF 36% in 9/2024)  # CHB s/p CRT   - S/p 2 or 3 ablations previously for V. Tach  - EKG on admission showed A. Fib with RVR, ventricular paced rhythm,  bpm  - Medtronic ICD interrogated in ED and showed over 107 episodes of some type of arrhythmia   - Troponin 66 --> 59 on admission   - S/p ASA 324mg prior to arrival   - S/p Nitroglycerin x1 by EMS   - S/p Amiodarone 150mg IV in ED   - Hold Amiodarone 200mg daily   - Continue Amiodarone drip   - Cardiology consulted, appreciate recs   - Complete echo ordered   - Continue Lasix 60mg daily, Coreg 25mg TID  - Hold Jardiance 25mg daily  - Goal Mg > 2 and K > 4     # Prolonged Qtc   - EKG on admission showed Qtc 611  - EKG ordered for tomorrow AM     # Atrial Fibrillation   - [id number] 5  - Hold Eliquis 2.5mg BID  - Start high intensity heparin gtt in case of procedure     # HTN   # HLD   - Hold Lisinopril 10mg daily d/t elevated Cr     PULMONARY:  # Shortness of breath   - Currently on 2L NC oxygen for comfort   - Recently discharged for HFrEF exacerbation on 12/28/24   - BNP 1,216  on admission (was during last admission)   - CXR in ED showed slight left sided pleural effusion w/ atelectasis   - No evidence of infection   - Encourage incentive spirometry   - Continue to monitor     RENAL/ GENITOURINARY:  # Hypokalemia   - K 3.2 on admission   - S/p 40 mEq Kcl in ED   - Continue to monitor     # Elevated Creatinine   # CKD Stage 3  - Cr 1.93 on admission (baseline appears to be 1.6-1.8)  - Avoid nephrotoxic meds     # BPH   - Continue Flomax 0.4mg daily     GASTROENTEROLOGY:  #GERD  - Stable, not on home meds    ENDOCRINOLOGY:  # T2DM   - Last HbA1c 7.7% Aug 2024   - Lantus recently decreased to 15 units nightly d/t hypoglycemia during recent admission   - Decrease Lantus to 7 units nightly while NPO   - Hold Glipizide 5mg daily   - Continue NPO SSI     # Hypothyroidism  - Continue Levothyroxine 75mcg daily     RHEUMATOLOGY:  - No active/acute issues     HEMATOLOGY:  - No active/acute issues     MUSCULOSKELETAL/ SKIN:  - No active/acute issues     INFECTIOUS DISEASE:  - No active/acute issues     Fluids: PRN bolus   Electrolytes: replete as needed  Nutrition: NPO except ice chips & sips   GI PPx: None   DVT PPx: Heparin drip     Oxygen: 2L NC   Access: PIV  Drips: Amiodarone, Heparin   Antibiotics: None     Dispo: Admitted to ICU for further management and continued monitoring of recurrent V. Tach/SVT on Amiodarone drip. Cardiology consulted.     Sejal Guerrero,    PGY-3 Internal Medicine

## 2025-01-14 ENCOUNTER — APPOINTMENT (OUTPATIENT)
Dept: CARDIOLOGY | Facility: HOSPITAL | Age: 89
End: 2025-01-14
Payer: MEDICARE

## 2025-01-14 LAB
ALBUMIN SERPL BCP-MCNC: 3.3 G/DL (ref 3.4–5)
ANION GAP SERPL CALC-SCNC: 14 MMOL/L (ref 10–20)
BUN SERPL-MCNC: 35 MG/DL (ref 6–23)
CALCIUM SERPL-MCNC: 8.3 MG/DL (ref 8.6–10.3)
CHLORIDE SERPL-SCNC: 103 MMOL/L (ref 98–107)
CO2 SERPL-SCNC: 29 MMOL/L (ref 21–32)
CREAT SERPL-MCNC: 2.07 MG/DL (ref 0.5–1.3)
EGFRCR SERPLBLD CKD-EPI 2021: 30 ML/MIN/1.73M*2
ERYTHROCYTE [DISTWIDTH] IN BLOOD BY AUTOMATED COUNT: 16.8 % (ref 11.5–14.5)
ERYTHROCYTE [DISTWIDTH] IN BLOOD BY AUTOMATED COUNT: 16.8 % (ref 11.5–14.5)
GLUCOSE BLD MANUAL STRIP-MCNC: 152 MG/DL (ref 74–99)
GLUCOSE BLD MANUAL STRIP-MCNC: 180 MG/DL (ref 74–99)
GLUCOSE BLD MANUAL STRIP-MCNC: 200 MG/DL (ref 74–99)
GLUCOSE BLD MANUAL STRIP-MCNC: 213 MG/DL (ref 74–99)
GLUCOSE BLD MANUAL STRIP-MCNC: 267 MG/DL (ref 74–99)
GLUCOSE SERPL-MCNC: 197 MG/DL (ref 74–99)
HCT VFR BLD AUTO: 47.2 % (ref 41–52)
HCT VFR BLD AUTO: 47.3 % (ref 41–52)
HGB BLD-MCNC: 14.7 G/DL (ref 13.5–17.5)
HGB BLD-MCNC: 14.9 G/DL (ref 13.5–17.5)
MAGNESIUM SERPL-MCNC: 2.15 MG/DL (ref 1.6–2.4)
MCH RBC QN AUTO: 27.4 PG (ref 26–34)
MCH RBC QN AUTO: 27.4 PG (ref 26–34)
MCHC RBC AUTO-ENTMCNC: 31.1 G/DL (ref 32–36)
MCHC RBC AUTO-ENTMCNC: 31.6 G/DL (ref 32–36)
MCV RBC AUTO: 87 FL (ref 80–100)
MCV RBC AUTO: 88 FL (ref 80–100)
NRBC BLD-RTO: 0 /100 WBCS (ref 0–0)
NRBC BLD-RTO: 0 /100 WBCS (ref 0–0)
PHOSPHATE SERPL-MCNC: 3.6 MG/DL (ref 2.5–4.9)
PLATELET # BLD AUTO: 156 X10*3/UL (ref 150–450)
PLATELET # BLD AUTO: 169 X10*3/UL (ref 150–450)
POTASSIUM SERPL-SCNC: 3.8 MMOL/L (ref 3.5–5.3)
RBC # BLD AUTO: 5.36 X10*6/UL (ref 4.5–5.9)
RBC # BLD AUTO: 5.44 X10*6/UL (ref 4.5–5.9)
SODIUM SERPL-SCNC: 142 MMOL/L (ref 136–145)
UFH PPP CHRO-ACNC: 0.4 IU/ML
UFH PPP CHRO-ACNC: 0.5 IU/ML
UFH PPP CHRO-ACNC: 0.5 IU/ML
UFH PPP CHRO-ACNC: 1 IU/ML
UFH PPP CHRO-ACNC: 1 IU/ML
WBC # BLD AUTO: 5.8 X10*3/UL (ref 4.4–11.3)
WBC # BLD AUTO: 6.1 X10*3/UL (ref 4.4–11.3)

## 2025-01-14 PROCEDURE — 83735 ASSAY OF MAGNESIUM: CPT

## 2025-01-14 PROCEDURE — 82947 ASSAY GLUCOSE BLOOD QUANT: CPT

## 2025-01-14 PROCEDURE — 36415 COLL VENOUS BLD VENIPUNCTURE: CPT

## 2025-01-14 PROCEDURE — 99291 CRITICAL CARE FIRST HOUR: CPT | Performed by: STUDENT IN AN ORGANIZED HEALTH CARE EDUCATION/TRAINING PROGRAM

## 2025-01-14 PROCEDURE — 85027 COMPLETE CBC AUTOMATED: CPT

## 2025-01-14 PROCEDURE — 80069 RENAL FUNCTION PANEL: CPT

## 2025-01-14 PROCEDURE — 85520 HEPARIN ASSAY: CPT

## 2025-01-14 PROCEDURE — 2060000001 HC INTERMEDIATE ICU ROOM DAILY

## 2025-01-14 PROCEDURE — 93005 ELECTROCARDIOGRAM TRACING: CPT

## 2025-01-14 PROCEDURE — 2500000002 HC RX 250 W HCPCS SELF ADMINISTERED DRUGS (ALT 637 FOR MEDICARE OP, ALT 636 FOR OP/ED)

## 2025-01-14 PROCEDURE — 2500000001 HC RX 250 WO HCPCS SELF ADMINISTERED DRUGS (ALT 637 FOR MEDICARE OP)

## 2025-01-14 PROCEDURE — 99233 SBSQ HOSP IP/OBS HIGH 50: CPT | Performed by: INTERNAL MEDICINE

## 2025-01-14 PROCEDURE — 2500000004 HC RX 250 GENERAL PHARMACY W/ HCPCS (ALT 636 FOR OP/ED)

## 2025-01-14 PROCEDURE — 85520 HEPARIN ASSAY: CPT | Performed by: INTERNAL MEDICINE

## 2025-01-14 PROCEDURE — 2500000005 HC RX 250 GENERAL PHARMACY W/O HCPCS

## 2025-01-14 RX ORDER — INSULIN LISPRO 100 [IU]/ML
0-10 INJECTION, SOLUTION INTRAVENOUS; SUBCUTANEOUS
Status: DISCONTINUED | OUTPATIENT
Start: 2025-01-14 | End: 2025-01-15 | Stop reason: HOSPADM

## 2025-01-14 RX ADMIN — AMIODARONE HYDROCHLORIDE 0.5 MG/MIN: 1.8 INJECTION, SOLUTION INTRAVENOUS at 17:29

## 2025-01-14 RX ADMIN — TAMSULOSIN HYDROCHLORIDE 0.4 MG: 0.4 CAPSULE ORAL at 08:42

## 2025-01-14 RX ADMIN — INSULIN LISPRO 6 UNITS: 100 INJECTION, SOLUTION INTRAVENOUS; SUBCUTANEOUS at 21:05

## 2025-01-14 RX ADMIN — FUROSEMIDE 60 MG: 40 TABLET ORAL at 08:45

## 2025-01-14 RX ADMIN — SPIRONOLACTONE 25 MG: 25 TABLET ORAL at 08:42

## 2025-01-14 RX ADMIN — LISINOPRIL 10 MG: 5 TABLET ORAL at 08:42

## 2025-01-14 RX ADMIN — OXYCODONE HYDROCHLORIDE 5 MG: 5 TABLET ORAL at 21:06

## 2025-01-14 RX ADMIN — AMIODARONE HYDROCHLORIDE 0.5 MG/MIN: 1.8 INJECTION, SOLUTION INTRAVENOUS at 04:20

## 2025-01-14 RX ADMIN — CARVEDILOL 25 MG: 12.5 TABLET, FILM COATED ORAL at 08:42

## 2025-01-14 RX ADMIN — CARVEDILOL 25 MG: 12.5 TABLET, FILM COATED ORAL at 16:25

## 2025-01-14 RX ADMIN — POTASSIUM CHLORIDE 20 MEQ: 1500 TABLET, EXTENDED RELEASE ORAL at 08:42

## 2025-01-14 RX ADMIN — LEVOTHYROXINE SODIUM 75 MCG: 75 TABLET ORAL at 08:42

## 2025-01-14 RX ADMIN — Medication 2 L/MIN: at 20:57

## 2025-01-14 RX ADMIN — INSULIN GLARGINE 15 UNITS: 100 INJECTION, SOLUTION SUBCUTANEOUS at 21:06

## 2025-01-14 RX ADMIN — HEPARIN SODIUM 900 UNITS/HR: 10000 INJECTION, SOLUTION INTRAVENOUS at 16:29

## 2025-01-14 RX ADMIN — CARVEDILOL 25 MG: 12.5 TABLET, FILM COATED ORAL at 21:05

## 2025-01-14 ASSESSMENT — PAIN SCALES - GENERAL
PAINLEVEL_OUTOF10: 0 - NO PAIN
PAINLEVEL_OUTOF10: 9
PAINLEVEL_OUTOF10: 0 - NO PAIN

## 2025-01-14 ASSESSMENT — PAIN - FUNCTIONAL ASSESSMENT
PAIN_FUNCTIONAL_ASSESSMENT: 0-10
PAIN_FUNCTIONAL_ASSESSMENT: 0-10

## 2025-01-14 ASSESSMENT — PAIN DESCRIPTION - DESCRIPTORS: DESCRIPTORS: DISCOMFORT

## 2025-01-14 NOTE — CARE PLAN
The patient's goals for the shift include  To have no more chest pain     The clinical goals for the shift include to have no s/s of bleeding related to heparin drip.      Over the shift, the patient did not make progress toward the following goals. Barriers to progression include continuing on heparin drip per physicians order.. Recommendations to address these barriers include continuing to monitor heparin assay levels..

## 2025-01-14 NOTE — PROGRESS NOTES
Subjective Data:  Denies CP, palpations    Overnight Events:    No ventricular arrhythmias seen on telemetry for >24 hours     Objective Data:  Last Recorded Vitals:  Vitals:    01/14/25 0500 01/14/25 0559 01/14/25 0600 01/14/25 0915   BP: (!) 113/92  104/73    Pulse: 70  71    Resp: 17      Temp:       TempSrc:       SpO2: 94%  94%    Weight:  85.1 kg (187 lb 9.8 oz)  84.2 kg (185 lb 10 oz)   Height:           Last Labs:  CBC - 1/14/2025:  7:54 AM  6.1 14.7 169    47.3      CMP - 1/14/2025:  5:13 AM  8.3 7.7 21 --- 1.7   3.6 3.3 14 146      PTT - No results in last year.  _   _ _     TROPHS   Date/Time Value Ref Range Status   01/12/2025 10:48 PM 59 0 - 20 ng/L Final     Comment:     Previous result verified on 1/12/2025 2156 on specimen/case [id number] called with component [facility] for procedure Troponin I, High Sensitivity, Initial with value 66 ng/L.   01/12/2025 09:22 PM 66 0 - 20 ng/L Final     BNP   Date/Time Value Ref Range Status   01/12/2025 09:22 PM 1,216 0 - 99 pg/mL Final     HGBA1C   Date/Time Value Ref Range Status   08/29/2024 06:58 PM 7.7 4.7 - 6.4 % Final     Comment:     Interpretation:     Standardized A1c  Good control or normal:  4-6% ( mg/dL avg)  Moderate control:        6.1-8.0% (120-180 mg/dL avg)  Poor control:            >8.0% (180 mg/dL avg)  With 4% as a baseline, each 1% increase = 30 mg/dL increase in average   glucose.  Taken from DCCT (Diabetes Control Complications Trial)   08/20/2024 09:31 AM 8.1 4.7 - 6.4 % Final     Comment:     Interpretation:     Standardized A1c  Good control or normal:  4-6% ( mg/dL avg)  Moderate control:        6.1-8.0% (120-180 mg/dL avg)  Poor control:            >8.0% (180 mg/dL avg)  With 4% as a baseline, each 1% increase = 30 mg/dL increase in average   glucose.  Taken from DCCT (Diabetes Control Complications Trial)      Last I/O:  I/O last 3 completed shifts:  In: 926.7 (10.9 mL/kg) [P.O.:400; I.V.:426.7 (5 mL/kg); IV  Piggyback:100]  Out: 1475 (17.3 mL/kg) [Urine:1475 (0.5 mL/kg/hr)]  Weight: 85.1 kg     Past Cardiology Tests (Last 3 Years):  EKG:  ECG 12 Lead 01/13/2025 (Preliminary)      ECG 12 lead 01/12/2025 (Preliminary)    Echo:  Transthoracic Echo (TTE) Complete 01/13/2025  CONCLUSIONS:   1. The left ventricular systolic function is severely decreased, with a visually estimated ejection fraction of 20-25%.   2. There is global hypokinesis of the left ventricle with minor regional variations.   3. Left ventricular cavity size is moderately dilated.   4. There is reduced right ventricular systolic function.   5. Severely enlarged right ventricle.   6. The left atrium is severely dilated.   7. The right atrium is moderately dilated.   8. Mild to moderate mitral valve regurgitation.   9. Mild to moderate tricuspid regurgitation visualized.  10. Right ventricular systolic pressure is within normal limits.  11. There is moderate pulmonic valve regurgitation.    Expand All Collapse All    Inpatient consult to Cardiology  Consult performed by: LAUREN Mon-CNP  Consult ordered by: Shelbie Swain DO           History Of Present Illness:    Valentin Rojas is a 89 y.o. male with PMH of HFrEF/NICM (EF 36% 9/2024) s/p CRT-D, CHB, CKD3, T2DM, HTN, HLD, GERD, BPH, hypothyroidism, V. Tach s/p ablation, A-fib (on Eliquis), CKD3a who presented to [facility] ED on 1/12/25 with midsternal chest discomfort and palpitations. For 2 weeks prior admission had been waking up with presyncope, severe palpitations. When EMS arrived, he was given nitroglycerin and took his own 324mg of aspirin. Per EMS, he was having runs of V tach x3 on the monitor. He follows with cardiology at [facility]. recently discharged on 12/28/24 after being admitted for HFrEF exacerbation. BNP on admission was 5230. He had stop taking his Lasix PTA. He was admitted at [facility] 9/2024 with ICD firing at which time Amiodarone was increased from 200 to 400  however patient opted against this at home. [facility] 9/2024 with nonobstructive CAD. Home medications include amiodarone, Eliquis, Coreg, Jardiance, Lasix, lisinopril, potassium, rosuvastatin. Labs significant for potassium 2.9, BUN 34, creatinine 1.72, BNP 1216, troponin 66, 59.      Review of Systems   Constitutional:  Positive for fatigue.   Respiratory:  Positive for shortness of breath.    Cardiovascular:  Positive for palpitations and leg swelling.   All other systems reviewed and are negative.        Last Recorded Vitals:  Vitals          Vitals:     01/13/25 0414 01/13/25 0500 01/13/25 0540 01/13/25 0600   BP:   121/82   119/82   BP Location:           Patient Position:           Pulse:   72   72   Resp:   15   18   Temp:           TempSrc:           SpO2: 96% 95%   96%   Weight:     85.1 kg (187 lb 9.8 oz)     Height:                    Last Labs:  CBC - 1/13/2025:  6:00 AM  5.2 13.8 154    44.3       CMP - 1/13/2025:  6:00 AM  8.2 7.7 21 --- 1.7   3.2 3.3 14 146       PTT - No results in last year.      _   _ _              Troponin I, High Sensitivity   Date/Time Value Ref Range Status   01/12/2025 10:48 PM 59 (HH) 0 - 20 ng/L Final       Comment:       Previous result verified on 1/12/2025 2156 on specimen/case [id number] called with component [facility] for procedure Troponin I, High Sensitivity, Initial with value 66 ng/L.   01/12/2025 09:22 PM 66 (HH) 0 - 20 ng/L Final            BNP   Date/Time Value Ref Range Status   01/12/2025 09:22 PM 1,216 (H) 0 - 99 pg/mL Final              Hemoglobin A1C   Date/Time Value Ref Range Status   08/29/2024 06:58 PM 7.7 (H) 4.7 - 6.4 % Final       Comment:       Interpretation:     Standardized A1c  Good control or normal:  4-6% ( mg/dL avg)  Moderate control:        6.1-8.0% (120-180 mg/dL avg)  Poor control:            >8.0% (180 mg/dL avg)  With 4% as a baseline, each 1% increase = 30 mg/dL increase in average   glucose.  Taken from DCCT (Diabetes Control  Complications Trial)   08/20/2024 09:31 AM 8.1 (H) 4.7 - 6.4 % Final       Comment:       Interpretation:     Standardized A1c  Good control or normal:  4-6% ( mg/dL avg)  Moderate control:        6.1-8.0% (120-180 mg/dL avg)  Poor control:            >8.0% (180 mg/dL avg)  With 4% as a baseline, each 1% increase = 30 mg/dL increase in average   glucose.  Taken from DCCT (Diabetes Control Complications Trial)      Last I/O:  I/O last 3 completed shifts:  In: 426.7 (5 mL/kg) [I.V.:426.7 (5 mL/kg)]  Out: 0 (0 mL/kg)   Weight: 85.1 kg      Past Cardiology Tests (Last 3 Years):  EKG:  No results found for this or any previous visit from the past 1095 days.     Echo:  9/3/2024 [facility]  CONCLUSIONS:   - Exam indication: Evaluation of known heart failure to guide therapy   - The left ventricle is normal in size. There is mild posterior left ventricular   hypertrophy. Left ventricular systolic function is moderately decreased. EF = 36 ±    5% (2D biplane) Left ventricular diastolic function was not evaluated due to   pacing.   - The right ventricle is dilated. Right ventricular systolic function is normal.   - The left atrial cavity is severely dilated.   - The right atrial cavity is mildly dilated.   - The visualized aorta is borderline dilated with a maximal dimension of 3.9 cm.   - There is moderate (2+) tricuspid valve regurgitation.   - Exam was compared with the prior CC echocardiographic exam performed on   2/14/2020, LV systolic function has improved ( it was 20%)        Ejection Fractions:  EF   Date/Time Value Ref Range Status   01/13/2025 09:30 AM 23 %      Cath:  ERVICE DATE: 9/3/2024   SERVICE TIME:  1:07 PM       CARDIOLOGIST: Amber Hurley MD   ATTENDING: Lorri Smith MD   PRIMARY CARE PHYSICIAN: Puja Barrera DO   REFERRING PROVIDER: Puja Barrera DO     [place] STUDY OF HEALTH and AGING SCALE:6=Moderately Frail     CARDIOVASCULAR INSTABILITY:No     PRE-PROCEDURE DIAGNOSIS:   Abnormal  Stress Test     POST PROCEDURE DIAGNOSIS:   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 Moderate sedation consisting of continuous ECG,   pulse oximetry and cardiopulmonary monitoring was performed by   the Cardiology Nurse, overseen by supervising physician, for an   intra-service time of 0 hr. 20 min.     Sedative Medications:   Drug: Versed   Dose: 1 mg   Route: IV     Drug: Fentanyl   Dose: 25 mcg   Route: IV   PRIORITY AT TIME OF PROCEDURE: Elective     SITE OF ENTRY: Radial:Right Radial     CONTRAST: Omnipaque 350 mg Iodine/mL (iohexol injection,   solution): 25 mL     ADDITIONAL PROCEDURES     CORONARY ANGIOGRAPHY:   The patient was taken to the cardiac cath lab where the entry   site was prepped and draped in a sterile manner. Under local   anesthesic with 2% Lidocaine, the vessel was cannulated with   Seldinger's technique using an arterial needle and a 6F sheath   was introduced.     Selective injections were made in the left and right coronary   arteries and various right, left and oblique views were obtained.       ADDITIONAL PROCEDURES     LEFT MAIN TRUNK: No Stenosis     LEFT ANTERIOR DESCENDIN% Stenosis     Location: Proximal      DIAGONAL #1: No Stenosis   DIAGONAL #2: No Stenosis     LEFT CIRCUMFLEX: 20% Stenosis    Location: Proximal     MARGINAL #1: No Stenosis     MARGINAL #2: No Stenosis     MARGINAL #3: No Stenosis     DOMINANT: YES   POSTERIOR DESCENDIN% Stenosis    Location: Proximal      RIGHT CORONARY: No Stenosis     DOMINANT: NO     Hemodynamics:   LVEDP: 8 mm Hg.   LV-Ao gradient : 0 mm Hg.   LVEF: Not done. LEVEF available by nuclear image. + CKD.       The sheath was removed and hemostatsis was established using   manual pressure using wrist band. There was no bleeding at the   end of the procedure.  The pt was returned to the recovery room in   a stable condition     ESTIMATED BLOOD LOSS: Less Than Minimal Unless Noted Here.     COMPLICATIONS: None     SPECIMENS: No specimens obtained unless noted here.     CONDITION: Stable     RECOMMENDATIONS: Follow protocol of post-op orders. Aggressive   modification of risk factors. [employer] [facility] [employer].   SIGNATURE: Amber Hurley MD PATIENT NAME: Valentin Rojas     Stress Test:  Nuclear Stress Test 08/30/2024  Nuclear Stress Test 08/30/2024  PATIENT:   Name: MR. VALENTIN ROJAS   MRN: [id number]   Age: 89 years   Gender: M     CONCLUSIONS:    1. SPECT Perfusion Study: Abnormal.    2. There is mild (<10%) ischemia in the territory of the LCX.    3. There is a small (<10%) fixed perfusion defect in the RCA territory.    4. Left ventricle is severely dilated. The left ventricle systolic   function is moderately decreased.    5. This is an intermediate risk scan.        LVEF % 34     Cardiac Imaging:  No results found for this or any previous visit from the past 1095 days.      Inpatient Medications:  Scheduled medications   Medication Dose Route Frequency    [Held by provider] amiodarone  200 mg oral Daily    carvedilol  25 mg oral TID    furosemide  60 mg oral Daily    insulin glargine  15 Units subcutaneous Nightly    insulin lispro  0-10 Units subcutaneous TID AC    levothyroxine  75 mcg oral q AM    lisinopril  10 mg oral Daily    perflutren protein A microsphere  0.5 mL intravenous Once in imaging    potassium chloride CR  20 mEq oral Daily    potassium chloride CR  40 mEq oral Once    spironolactone  25 mg oral q24h VENKATESH    sulfur hexafluoride microsphr  2 mL intravenous Once in imaging    tamsulosin  0.4 mg oral Daily     PRN medications   Medication    acetaminophen    dextrose    dextrose    glucagon    glucagon    heparin    oxyCODONE    oxyCODONE     Continuous Medications   Medication Dose Last Rate    amiodarone  0.5 mg/min 0.5 mg/min (01/14/25  0420)    heparin  0-4,500 Units/hr 1,100 Units/hr (01/14/25 0700)     Physical Exam  Vitals reviewed.   Constitutional:       Appearance: Normal appearance. He is normal weight.   HENT:      Head: Normocephalic and atraumatic.   Neck:      Vascular: No carotid bruit or JVD.   Cardiovascular:      Rate and Rhythm: Normal rate and regular rhythm.      Pulses: Normal pulses.      Heart sounds: Normal heart sounds.   Pulmonary:      Effort: Pulmonary effort is normal.      Breath sounds: Normal breath sounds.   Chest:      Chest wall: No tenderness.   Abdominal:      General: Abdomen is flat. Bowel sounds are normal.      Palpations: Abdomen is soft.   Skin:     General: Skin is warm and dry.   Neurological:      General: No focal deficit present.      Mental Status: He is alert and oriented to person, place, and time. Mental status is at baseline.   Psychiatric:         Mood and Affect: Mood normal.         Behavior: Behavior normal.            Assessment/Plan   Assessment  89 y.o. male with PMH of HFrEF/NICM (EF 36% 9/2024) s/p CRT-D, CHB, CKD3, T2DM, HTN, HLD, GERD, BPH, hypothyroidism, VT s/p ablation, A-fib (on Eliquis), CKD3a who presented to [facility] ED on 1/12/25 with midsternal chest discomfort and palpitations. Found to have nonsustained VT. [facility] 9/2024 with nonobstructive CAD. Home medications include amiodarone, Eliquis, Coreg, Jardiance, Lasix, lisinopril, potassium, rosuvastatin. Labs significant for potassium 2.9, BUN 34, creatinine 1.72, BNP 1216, troponin 66, 59.      HFrEF/NICM (EF 36% 9/2024) s/p CRT-D  Nonsustained VT   Persistent atrial fibrillation  Hypokalemia - resolved   HTN  HLD     Plan  -TTE with LVEF 20-25%  -EP recommended amiodarone 400 mg daily as maintenance dose - patient agrees to take 200 mg BID  -Continue amiodarone gtt, transition to po 200 mg BID tomorrow  -Eliquis on hold, continue heparin gtt  -Continue Coreg 25 mg TID, Lasix 60 mg daily, lisinopril 10 mg daily  -Continue  spironolactone 25 mg daily  -Keep Mag >2, K >4  -Will follow, anticipate discharge tomorrow    Peripheral IV 01/12/25 18 G Distal;Right Forearm (Active)   Site Assessment Clean;Intact;Dry 01/14/25 0808   Dressing Status Clean;Dry 01/14/25 0808   Number of days: 2       Peripheral IV 01/13/25 20 G Proximal;Right;Anterior Forearm (Active)   Site Assessment Dry;Intact;Clean 01/14/25 0808   Dressing Status Clean;Dry 01/14/25 0808   Number of days: 1       Code Status:  Full Code    I spent minutes in the professional and overall care of this patient.        Sarath Bourne, APRN-CNP

## 2025-01-14 NOTE — CARE PLAN
The patient's goals for the shift include      The clinical goals for the shift include Patient will have therapeutic range heparin assays this shift    Over the shift, the patient did not make progress toward the following goals. Barriers to progression include none. Recommendations to address these barriers include   t.

## 2025-01-14 NOTE — PROGRESS NOTES
"Medical Intensive Care - Daily Progress Note   Subjective    Valentin Rojas is an 89 year old male with PMHx of HFrEF/NICM (EF 36% 9/2024) s/p Medtronic ICD placement, CHB s/p CRT, CKD3, T2DM, HTN, HLD, GERD, BPH, hypothyroidism, V. Tach s/p ablation, A-fib (on Eliquis) who presented to [facility] ED on 1/12/25 with midsternal chest discomfort. 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    Interval History:  Patient was seen and examined at bedside. No acute events overnight. Patient denies new or worsening symptoms.    Meds    Scheduled medications  [Held by provider] amiodarone, 200 mg, oral, Daily  carvedilol, 25 mg, oral, TID  furosemide, 60 mg, oral, Daily  insulin glargine, 15 Units, subcutaneous, Nightly  insulin lispro, 0-10 Units, subcutaneous, TID AC  levothyroxine, 75 mcg, oral, q AM  lisinopril, 10 mg, oral, Daily  perflutren protein A microsphere, 0.5 mL, intravenous, Once in imaging  potassium chloride CR, 20 mEq, oral, Daily  potassium chloride CR, 40 mEq, oral, Once  spironolactone, 25 mg, oral, q24h VENKATESH  sulfur hexafluoride microsphr, 2 mL, intravenous, Once in imaging  tamsulosin, 0.4 mg, oral, Daily      Continuous medications  amiodarone, 0.5 mg/min, Last Rate: 0.5 mg/min (01/14/25 0420)  heparin, 0-4,500 Units/hr, Last Rate: 1,100 Units/hr (01/14/25 0700)      PRN medications  PRN medications: acetaminophen, dextrose, dextrose, glucagon, glucagon, heparin, oxyCODONE, oxyCODONE     Objective    Blood pressure 104/73, pulse 71, temperature 36.6 °C (97.9 °F), temperature source Temporal, resp. rate 17, height 1.803 m (5' 11\"), weight 85.1 kg (187 lb 9.8 oz), SpO2 94%.     Physical Exam  Constitutional:       General: He is not in acute distress.  Cardiovascular:      Rate and Rhythm: Normal rate.      Heart sounds:      No friction rub. No gallop.   Pulmonary:      Effort: No respiratory distress.      Breath sounds: No stridor.   Musculoskeletal:      Right " "lower leg: Edema present.      Left lower leg: Edema present.      Comments: Trace edema   Neurological:      Mental Status: He is alert and oriented to person, place, and time. Mental status is at baseline.            Intake/Output Summary (Last 24 hours) at 1/14/2025 0749  Last data filed at 1/14/2025 0400  Gross per 24 hour   Intake 500 ml   Output 1475 ml   Net -975 ml     Labs:   Results from last 72 hours   Lab Units 01/14/25  0513 01/13/25  0600 01/12/25  2122   SODIUM mmol/L 142 142 139   POTASSIUM mmol/L 3.8 2.9* 3.2*   CHLORIDE mmol/L 103 103 97*   CO2 mmol/L 29 30 30   BUN mg/dL 35* 34* 35*   CREATININE mg/dL 2.07* 1.72* 1.93*   GLUCOSE mg/dL 197* 220* 240*   CALCIUM mg/dL 8.3* 8.2* 8.8   ANION GAP mmol/L 14 12 15   EGFR mL/min/1.73m*2 30* 38* 33*   PHOSPHORUS mg/dL 3.6 3.2 3.8      Results from last 72 hours   Lab Units 01/14/25  0513 01/13/25  0600 01/12/25 2122   WBC AUTO x10*3/uL 5.8 5.2 6.1   HEMOGLOBIN g/dL 14.9 13.8 14.3   HEMATOCRIT % 47.2 44.3 45.7   PLATELETS AUTO x10*3/uL 156 154 174   NEUTROS PCT AUTO %  --   --  70.7   LYMPHS PCT AUTO %  --   --  21.6   MONOS PCT AUTO %  --   --  6.9   EOS PCT AUTO %  --   --  0.3                 Micro/ID:     No results found for: \"URINECULTURE\", \"BLOODCULT\", \"CSFCULTSMEAR\"    Summary of key imaging results from the last 24 hours  Transthoracic Echo (TTE) Complete    Result Date: 1/13/2025   [facility], [address] [place] [address]               Tel [phone] and Fax [phone] TRANSTHORACIC ECHOCARDIOGRAM REPORT  Patient Name:       DAVID ESTRADA    Reading Physician:    [id number] Nicole Butler MD Study Date:         1/13/2025           Ordering Provider:    [id number] FERMÍN KRAFT MRN/PID:            [id number]            Fellow: " Accession#:         [id number]        Nurse:                Dena Adams RN Date of Birth/Age:  1935 / 89      Sonographer:          Edwige Calvillo                     judson                                     RDCAMILA Gender assigned at  M                   Additional Staff: Birth: Height:             180.34 cm           Admit Date:           1/12/2025 Weight:             84.82 kg            Admission Status:     Inpatient -                                                               Routine BSA / BMI:          2.05 m2 / 26.08     Encounter#:           [id number]                     kg/m2 Blood Pressure:     119/82 mmHg         Department Location:  [facility] Non                                                               Invasive Study Type:    TRANSTHORACIC ECHO (TTE) COMPLETE Diagnosis/ICD: Supraventricular tachycardia-I47.1; Ventricular tachycardia,                other-I47.29 Indication:    SVT, VT CPT Code:      Echo Complete w Full Doppler-[id number] Patient History: Pertinent         V-tach /w ablation, NICM, CKD, DM, Chest Pain, A-Fib and History:          Dyspnea. Study Detail: The following Echo studies were performed: 2D, M-Mode, Doppler and               color flow. Definity used as a contrast agent for endocardial               border definition. Total contrast used for this procedure was 1.0               mL via IV push. Unable to obtain subcostal view. Patient has a               defibrillator. Patient has a pacemaker.               The patient was awake.  PHYSICIAN INTERPRETATION: Left Ventricle: The left ventricular systolic function is severely decreased, with a visually estimated ejection fraction of 20-25%. There is mild eccentric left ventricular hypertrophy. There is global hypokinesis of the left ventricle with minor regional variations. The left ventricular cavity size is moderately dilated. There is mildly increased septal and normal posterior left ventricular wall thickness. Left  ventricular diastolic filling cannot be determined, due to right ventricular pacing. Left Atrium: The left atrium is severely dilated. Right Ventricle: The right ventricle is severely enlarged. There is reduced right ventricular systolic function. A device is visualized in the right ventricle. Right Atrium: The right atrium is moderately dilated. There is a device visualized in the right atrium. Aortic Valve: The aortic valve is trileaflet. There is mild aortic valve cusp calcification. The aortic valve dimensionless index is 0.67. There is no evidence of aortic valve regurgitation. The peak instantaneous gradient of the aortic valve is 2 mmHg. The mean gradient of the aortic valve is 1 mmHg. Mitral Valve: The mitral valve is mild to moderately thickened. There is mild to moderate mitral valve regurgitation. Tricuspid Valve: The tricuspid valve is structurally normal. There is mild to moderate tricuspid regurgitation. The Doppler estimated RVSP is within normal limits at 23.2 mmHg. Pulmonic Valve: The pulmonic valve is structurally normal. There is moderate pulmonic valve regurgitation. Pericardium: There is no pericardial effusion noted. Aorta: The aortic root is normal.  CONCLUSIONS:  1. The left ventricular systolic function is severely decreased, with a visually estimated ejection fraction of 20-25%.  2. There is global hypokinesis of the left ventricle with minor regional variations.  3. Left ventricular cavity size is moderately dilated.  4. There is reduced right ventricular systolic function.  5. Severely enlarged right ventricle.  6. The left atrium is severely dilated.  7. The right atrium is moderately dilated.  8. Mild to moderate mitral valve regurgitation.  9. Mild to moderate tricuspid regurgitation visualized. 10. Right ventricular systolic pressure is within normal limits. 11. There is moderate pulmonic valve regurgitation. QUANTITATIVE DATA SUMMARY:  2D MEASUREMENTS:          Normal Ranges: IVSd:             1.21 cm  (0.6-1.1cm) LVPWd:           0.98 cm  (0.6-1.1cm) LVIDd:           5.52 cm  (3.9-5.9cm) LVIDs:           5.13 cm LV Mass Index:   118 g/m2 LVEDV Index:     74 ml/m2 LV % FS          7.2 %  LA VOLUME:                    Normal Ranges: LA Vol A4C:        85.0 ml    (22+/-6mL/m2) LA Vol A2C:        107.0 ml LA Vol BP:         96.9 ml LA Vol Index A4C:  41.5 ml/m2 LA Vol Index A2C:  52.2 ml/m2 LA Vol Index BP:   47.3 ml/m2 LA Area A4C:       25.1 cm2 LA Area A2C:       28.6 cm2 LA Major Axis A4C: 6.3 cm LA Major Axis A2C: 6.5 cm LA Volume Index:   46.9 ml/m2 LA Vol A4C:        76.9 ml LA Vol A2C:        102.8 ml LA Vol Index BSA:  43.8 ml/m2  RA VOLUME BY A/L METHOD:          Normal Ranges: RA Area A4C:             24.4 cm2  AORTA MEASUREMENTS:         Normal Ranges: Ao Sinus, d:        2.80 cm (2.1-3.5cm) Asc Ao, d:          3.60 cm (2.1-3.4cm)  LV SYSTOLIC FUNCTION BY 2D PLANIMETRY (MOD):                      Normal Ranges: EF-A4C View:    19 % (>=55%) EF-A2C View:    22 % EF-Biplane:     21 % EF-Visual:      23 % LV EF Reported: 23 %  AORTIC VALVE:                     Normal Ranges: AoV Vmax:                0.68 m/s (<=1.7m/s) AoV Peak P.8 mmHg (<20mmHg) AoV Mean P.2 mmHg (1.7-11.5mmHg) LVOT Max Aduie:            0.40 m/s (<=1.1m/s) AoV VTI:                 10.28 cm (18-25cm) LVOT VTI:                6.92 cm LVOT Diameter:           2.31 cm  (1.8-2.4cm) AoV Area, VTI:           2.82 cm2 (2.5-5.5cm2) AoV Area,Vmax:           2.48 cm2 (2.5-4.5cm2) AoV Dimensionless Index: 0.67  RIGHT VENTRICLE: RV Basal 5.10 cm RV Mid   4.20 cm RV Major 8.8 cm TAPSE:   8.0 mm RV s'    0.08 m/s  TRICUSPID VALVE/RVSP:          Normal Ranges: Peak TR Velocity:     2.25 m/s RV Syst Pressure:     23 mmHg  (< 30mmHg)  PULMONIC VALVE:          Normal Ranges: PV Max Audie:     0.5 m/s  (0.6-0.9m/s) PV Max P.0 mmHg  AORTA: Asc Ao Diam 3.56 cm  [id number] Nicole Butler MD Electronically signed on  1/13/2025 at 11:46:18 AM  ** Final **     ECG 12 Lead    Result Date: 1/13/2025  Ventricular-paced rhythm Biventricular pacemaker detected Abnormal ECG When compared with ECG of 12-JAN-2025 21:03, (unconfirmed) Vent. rate has decreased BY  40 BPM    ECG 12 lead    Result Date: 1/13/2025  Ventricular-paced rhythm Biventricular pacemaker detected Abnormal ECG When compared with ECG of 29-NOV-2011 15:44, Electronic ventricular pacemaker has replaced Sinus rhythm Vent. rate has increased BY  56 BPM    XR chest 1 view    Result Date: 1/12/2025  Interpreted By:  Piero Lombardo, STUDY: XR CHEST 1 VIEW;  1/12/2025 9:26 pm   INDICATION: Signs/Symptoms:cp.     COMPARISON: None.   ACCESSION NUMBER(S): [id number]   ORDERING CLINICIAN: FERMÍN VAZQUEZ   FINDINGS: AP radiograph of the chest was provided.   Multi lead left subclavian AICD/pacemaker is in place.   CARDIOMEDIASTINAL SILHOUETTE: Cardiomediastinal silhouette is enlarged.   LUNGS: There are low lung volumes with bronchovascular crowding and pulmonary vascular congestion. Trace left-sided pleural effusion/atelectasis or consolidation are not excluded. No consolidation or sizable effusion are present in the right lung.   ABDOMEN: No remarkable upper abdominal findings.   BONES: No acute osseous changes.       1.  Low lung volumes with bronchovascular crowding and pulmonary vascular congestion. Trace left-sided pleural effusion with the associated atelectasis/consolidation is not excluded.       MACRO: None   Signed by: Piero Lombardo 1/12/2025 10:11 PM Dictation workstation:   [id number]     Assessment and Plan     Assessment:Valentin Rojas is an 89 year old male with PMHx of HFrEF/NICM (EF 36% 9/2024) s/p Medtronic ICD placement, CHB s/p CRT, CKD3, T2DM, HTN, HLD, GERD, BPH, hypothyroidism, V. Tach s/p ablation, A-fib (on Eliquis) who presented to [facility] ED on 1/12/25 with midsternal chest discomfort. Admitted to ICU for further management of  recurrent V. Tach/SVT on Amiodarone drip.      Summary for 01/14/25  :  Cardiology following, EP consulted  EP consulted: Continue IV amiodarone infusion for 1G, then can continue with 10G p.o.  Can transition to p.o. 200 Mg amiodarone twice daily tomorrow  Can likely anticipate discharge tomorrow  Will transition to p.o. Eliquis prior to discharge    Plan:  NEUROLOGY/PSYCH:  Dx:  NUNO     CARDIOVASCULAR:  Dx:  Recurrent V. Tach S/p prior ablations  Elevated Troponin 2/2 above   HFrEF/NICM s/p ICD placement (EF 36% in 9/2024)  CHB s/p CRT   Persistent atrial fibrillation  Management:  Continue amiodarone infusion  Cardiology following  EP consulted: Transition to p.o. 200 mg twice daily amiodarone tomorrow  Continue Lasix 60 Mg daily, Coreg 25 Mg 3 times daily,  Holding Jardiance 25 Mg daily  Maintain and replete electrolytes, Mg > 2 and K > 4     PULMONARY:      Dx:  NUNO     RENAL/GENITOURINARY:  Dx:  Hypokalemia (resolved)  Management:  Replete K as needed  Continue Aldactone 25 Mg daily     GASTROENTEROLOGY:  Dx:  GERD  Management:  Stable, no home medication     ENDOCRINOLOGY:  Dx:  Hypothyroidism  T2DM  HbA1c 7.7% Aug 2024   Home Lantus 22u nightly; 15u last admission  Management:  Continue Lantus 15U  SSI #2, resume diet  Hold glipizide 5 Mg daily  Continue home levothyroxine 75 mcg daily     HEMATOLOGY:  Dx:  NUNO     MUSCULOSKELETAL/SKIN:  Dx:  NUNO     INFECTIOUS DISEASE:  Dx:  NUNO    ICU Check List     FEN  Fluids: As needed  Electrolytes: As needed  Nutrition: Cardiac CCD 75G  Prophylaxis:  DVT ppx: Heparin GTT  GI ppx: -  Hardware:         External Urinary Catheter Male (Active)   Placement Date/Time: 01/13/25 0200   Hand Hygiene Completed: Yes  External Catheter Type: Male   Number of days: 1       Social:  Code: Full Code    HPOA:   Dena Rojas (Spouse)  [phone] (Work Phone)   Disposition: Admitted to ICU for further management and continued monitoring of recurrent V. Tach/SVT on Amiodarone &  heparin drip. Cardiology following.  Patient stable for transfer to stepdown.  Anticipate discharge tomorrow.             Miguel Garcia DO   01/14/25 at 7:49 AM     Disclaimer: Documentation completed with the information available at the time of input. The times in the chart may not be reflective of actual patient care times, interventions, or procedures. Documentation occurs after the physical care of the patient.

## 2025-01-15 ENCOUNTER — APPOINTMENT (OUTPATIENT)
Dept: CARDIOLOGY | Facility: HOSPITAL | Age: 89
End: 2025-01-15
Payer: MEDICARE

## 2025-01-15 VITALS
HEART RATE: 70 BPM | DIASTOLIC BLOOD PRESSURE: 77 MMHG | SYSTOLIC BLOOD PRESSURE: 116 MMHG | OXYGEN SATURATION: 95 % | RESPIRATION RATE: 22 BRPM | HEIGHT: 71 IN | WEIGHT: 185.63 LBS | BODY MASS INDEX: 25.99 KG/M2 | TEMPERATURE: 97.7 F

## 2025-01-15 PROBLEM — I47.20 VENTRICULAR TACHYARRHYTHMIA (MULTI): Status: RESOLVED | Noted: 2025-01-12 | Resolved: 2025-01-15

## 2025-01-15 LAB
ALBUMIN SERPL BCP-MCNC: 3.3 G/DL (ref 3.4–5)
ANION GAP SERPL CALC-SCNC: 15 MMOL/L (ref 10–20)
ATRIAL RATE: 62 BPM
BUN SERPL-MCNC: 37 MG/DL (ref 6–23)
CALCIUM SERPL-MCNC: 8.3 MG/DL (ref 8.6–10.3)
CHLORIDE SERPL-SCNC: 103 MMOL/L (ref 98–107)
CO2 SERPL-SCNC: 28 MMOL/L (ref 21–32)
CREAT SERPL-MCNC: 1.92 MG/DL (ref 0.5–1.3)
EGFRCR SERPLBLD CKD-EPI 2021: 33 ML/MIN/1.73M*2
ERYTHROCYTE [DISTWIDTH] IN BLOOD BY AUTOMATED COUNT: 17.1 % (ref 11.5–14.5)
GLUCOSE BLD MANUAL STRIP-MCNC: 103 MG/DL (ref 74–99)
GLUCOSE BLD MANUAL STRIP-MCNC: 134 MG/DL (ref 74–99)
GLUCOSE BLD MANUAL STRIP-MCNC: 169 MG/DL (ref 74–99)
GLUCOSE SERPL-MCNC: 87 MG/DL (ref 74–99)
HCT VFR BLD AUTO: 46.9 % (ref 41–52)
HGB BLD-MCNC: 14.9 G/DL (ref 13.5–17.5)
MAGNESIUM SERPL-MCNC: 2.17 MG/DL (ref 1.6–2.4)
MCH RBC QN AUTO: 28 PG (ref 26–34)
MCHC RBC AUTO-ENTMCNC: 31.8 G/DL (ref 32–36)
MCV RBC AUTO: 88 FL (ref 80–100)
NRBC BLD-RTO: 0 /100 WBCS (ref 0–0)
PHOSPHATE SERPL-MCNC: 3.3 MG/DL (ref 2.5–4.9)
PLATELET # BLD AUTO: 156 X10*3/UL (ref 150–450)
POTASSIUM SERPL-SCNC: 3.6 MMOL/L (ref 3.5–5.3)
Q ONSET: 189 MS
QRS COUNT: 12 BEATS
QRS DURATION: 186 MS
QT INTERVAL: 526 MS
QTC CALCULATION(BAZETT): 568 MS
QTC FREDERICIA: 554 MS
R AXIS: 181 DEGREES
RBC # BLD AUTO: 5.33 X10*6/UL (ref 4.5–5.9)
SODIUM SERPL-SCNC: 142 MMOL/L (ref 136–145)
T AXIS: 10 DEGREES
T OFFSET: 452 MS
UFH PPP CHRO-ACNC: 0.4 IU/ML
UFH PPP CHRO-ACNC: 0.4 IU/ML
VENTRICULAR RATE: 70 BPM
WBC # BLD AUTO: 7.4 X10*3/UL (ref 4.4–11.3)

## 2025-01-15 PROCEDURE — 99291 CRITICAL CARE FIRST HOUR: CPT | Performed by: STUDENT IN AN ORGANIZED HEALTH CARE EDUCATION/TRAINING PROGRAM

## 2025-01-15 PROCEDURE — 2500000001 HC RX 250 WO HCPCS SELF ADMINISTERED DRUGS (ALT 637 FOR MEDICARE OP)

## 2025-01-15 PROCEDURE — 82947 ASSAY GLUCOSE BLOOD QUANT: CPT

## 2025-01-15 PROCEDURE — 85520 HEPARIN ASSAY: CPT

## 2025-01-15 PROCEDURE — 36415 COLL VENOUS BLD VENIPUNCTURE: CPT

## 2025-01-15 PROCEDURE — 85027 COMPLETE CBC AUTOMATED: CPT

## 2025-01-15 PROCEDURE — 83735 ASSAY OF MAGNESIUM: CPT

## 2025-01-15 PROCEDURE — 80069 RENAL FUNCTION PANEL: CPT

## 2025-01-15 PROCEDURE — 93005 ELECTROCARDIOGRAM TRACING: CPT

## 2025-01-15 PROCEDURE — 99239 HOSP IP/OBS DSCHRG MGMT >30: CPT | Performed by: INTERNAL MEDICINE

## 2025-01-15 PROCEDURE — 2500000002 HC RX 250 W HCPCS SELF ADMINISTERED DRUGS (ALT 637 FOR MEDICARE OP, ALT 636 FOR OP/ED)

## 2025-01-15 PROCEDURE — 93010 ELECTROCARDIOGRAM REPORT: CPT | Performed by: INTERNAL MEDICINE

## 2025-01-15 PROCEDURE — 97161 PT EVAL LOW COMPLEX 20 MIN: CPT | Mod: GP

## 2025-01-15 PROCEDURE — 97165 OT EVAL LOW COMPLEX 30 MIN: CPT | Mod: GO

## 2025-01-15 RX ORDER — LEVOTHYROXINE SODIUM 75 UG/1
75 TABLET ORAL EVERY MORNING
Status: DISCONTINUED | OUTPATIENT
Start: 2025-01-16 | End: 2025-01-15 | Stop reason: HOSPADM

## 2025-01-15 RX ORDER — AMIODARONE HYDROCHLORIDE 200 MG/1
TABLET ORAL
Qty: 100 TABLET | Refills: 0 | Status: CANCELLED | OUTPATIENT
Start: 2025-01-15 | End: 2025-02-24

## 2025-01-15 RX ORDER — POTASSIUM CHLORIDE 1.5 G/1.58G
20 POWDER, FOR SOLUTION ORAL ONCE
Status: COMPLETED | OUTPATIENT
Start: 2025-01-15 | End: 2025-01-15

## 2025-01-15 RX ORDER — AMIODARONE HYDROCHLORIDE 200 MG/1
200 TABLET ORAL 2 TIMES DAILY
Status: DISCONTINUED | OUTPATIENT
Start: 2025-01-25 | End: 2025-01-15 | Stop reason: HOSPADM

## 2025-01-15 RX ORDER — SPIRONOLACTONE 25 MG/1
25 TABLET ORAL
Qty: 30 TABLET | Refills: 0 | Status: SHIPPED | OUTPATIENT
Start: 2025-01-16 | End: 2025-02-15

## 2025-01-15 RX ORDER — AMIODARONE HYDROCHLORIDE 400 MG/1
TABLET ORAL
Qty: 50 TABLET | Refills: 0 | Status: SHIPPED | OUTPATIENT
Start: 2025-01-15 | End: 2025-02-24

## 2025-01-15 RX ORDER — POTASSIUM CHLORIDE 1.5 G/1.58G
20 POWDER, FOR SOLUTION ORAL DAILY
Qty: 30 PACKET | Refills: 1 | Status: SHIPPED | OUTPATIENT
Start: 2025-01-15 | End: 2025-03-16

## 2025-01-15 RX ORDER — AMIODARONE HYDROCHLORIDE 200 MG/1
400 TABLET ORAL 2 TIMES DAILY
Status: DISCONTINUED | OUTPATIENT
Start: 2025-01-15 | End: 2025-01-15 | Stop reason: HOSPADM

## 2025-01-15 RX ADMIN — ACETAMINOPHEN 650 MG: 325 TABLET, FILM COATED ORAL at 00:31

## 2025-01-15 RX ADMIN — OXYCODONE HYDROCHLORIDE 5 MG: 5 TABLET ORAL at 12:46

## 2025-01-15 RX ADMIN — LISINOPRIL 10 MG: 5 TABLET ORAL at 09:27

## 2025-01-15 RX ADMIN — OXYCODONE HYDROCHLORIDE 5 MG: 5 TABLET ORAL at 02:52

## 2025-01-15 RX ADMIN — FUROSEMIDE 60 MG: 40 TABLET ORAL at 09:27

## 2025-01-15 RX ADMIN — INSULIN LISPRO 2 UNITS: 100 INJECTION, SOLUTION INTRAVENOUS; SUBCUTANEOUS at 12:39

## 2025-01-15 RX ADMIN — ACETAMINOPHEN 650 MG: 325 TABLET, FILM COATED ORAL at 06:56

## 2025-01-15 RX ADMIN — POTASSIUM CHLORIDE 20 MEQ: 1500 TABLET, EXTENDED RELEASE ORAL at 09:26

## 2025-01-15 RX ADMIN — CARVEDILOL 25 MG: 12.5 TABLET, FILM COATED ORAL at 09:27

## 2025-01-15 RX ADMIN — POTASSIUM CHLORIDE 20 MEQ: 1.5 POWDER, FOR SOLUTION ORAL at 12:39

## 2025-01-15 RX ADMIN — TAMSULOSIN HYDROCHLORIDE 0.4 MG: 0.4 CAPSULE ORAL at 09:26

## 2025-01-15 RX ADMIN — APIXABAN 2.5 MG: 2.5 TABLET, FILM COATED ORAL at 12:39

## 2025-01-15 RX ADMIN — SPIRONOLACTONE 25 MG: 25 TABLET ORAL at 09:27

## 2025-01-15 RX ADMIN — AMIODARONE HYDROCHLORIDE 400 MG: 200 TABLET ORAL at 12:38

## 2025-01-15 ASSESSMENT — COGNITIVE AND FUNCTIONAL STATUS - GENERAL
MOVING TO AND FROM BED TO CHAIR: A LITTLE
PERSONAL GROOMING: A LITTLE
DRESSING REGULAR LOWER BODY CLOTHING: A LITTLE
HELP NEEDED FOR BATHING: A LITTLE
TOILETING: A LITTLE
MOBILITY SCORE: 19
CLIMB 3 TO 5 STEPS WITH RAILING: A LOT
STANDING UP FROM CHAIR USING ARMS: A LITTLE
DAILY ACTIVITIY SCORE: 20
WALKING IN HOSPITAL ROOM: A LITTLE

## 2025-01-15 ASSESSMENT — PAIN SCALES - GENERAL
PAINLEVEL_OUTOF10: 4
PAINLEVEL_OUTOF10: 8
PAINLEVEL_OUTOF10: 7
PAINLEVEL_OUTOF10: 0 - NO PAIN
PAINLEVEL_OUTOF10: 3
PAINLEVEL_OUTOF10: 3
PAINLEVEL_OUTOF10: 0 - NO PAIN
PAINLEVEL_OUTOF10: 4
PAINLEVEL_OUTOF10: 7
PAINLEVEL_OUTOF10: 3

## 2025-01-15 ASSESSMENT — PAIN - FUNCTIONAL ASSESSMENT
PAIN_FUNCTIONAL_ASSESSMENT: 0-10

## 2025-01-15 ASSESSMENT — PAIN DESCRIPTION - ORIENTATION
ORIENTATION: OUTER
ORIENTATION: OUTER

## 2025-01-15 ASSESSMENT — ACTIVITIES OF DAILY LIVING (ADL)
ADL_ASSISTANCE: INDEPENDENT
BATHING_ASSISTANCE: STAND BY
ADL_ASSISTANCE: INDEPENDENT

## 2025-01-15 ASSESSMENT — PAIN DESCRIPTION - LOCATION
LOCATION: BACK

## 2025-01-15 ASSESSMENT — PAIN DESCRIPTION - DESCRIPTORS: DESCRIPTORS: ACHING;SORE

## 2025-01-15 NOTE — PROGRESS NOTES
Patient declines need for HHC. Spoke with patient and son at bedside and obtained IMM.        01/15/25 4989   Discharge Planning   Living Arrangements Spouse/significant other   Support Systems Spouse/significant other;Family members;Friends/neighbors   Assistance Needed Patient is A&O X3, on room air at baseline, does not uses a CPAP/BIPAP at home, is not currently active with any community/home care agencies, is independent with ADLs, drives and uses a walker for ambulation at home. Patient denies further needs upon discharge.   Type of Residence Private residence   Number of Stairs to Enter Residence 4   Number of Stairs Within Residence 0   Do you have animals or pets at home? No   Who is requesting discharge planning? Provider   Home or Post Acute Services None   Expected Discharge Disposition Home   Does the patient need discharge transport arranged? No

## 2025-01-15 NOTE — DISCHARGE SUMMARY
Discharge Diagnosis  SVT (supraventricular tachycardia) (CMS-[facility])  Hypokalemia    Issues Requiring Follow-Up  Recurrent V. Tach S/p prior ablations  Elevated Troponin 2/2 above   HFrEF/NICM s/p ICD placement (EF 36% in 9/2024)  CHB s/p CRT   Persistent atrial fibrillation        Test Results Pending At Discharge  Pending Labs       No current pending labs.            Hospital Course  Valentin Rojas is an 89 year old male with PMHx of HFrEF/NICM (EF 36% 9/2024) s/p Medtronic ICD placement, CHB s/p CRT, CKD3, T2DM, HTN, HLD, GERD, BPH, hypothyroidism, V. Tach s/p ablation, A-fib (on Eliquis) who presented to [facility] ED on 1/12/25 with midsternal chest discomfort. When EMS arrived, he was given nitroglycerin and took his own 324mg of aspirin. Per EMS, he was having runs of V tach x3 on the monitor. Per patient's son at bedside, he states that he spoke to his father on the phone this morning around 9am and he sounded more short of breath than normal. Patient explained that this is around the time he started to generally feel unwell. He felt like his heart was fluttering the entire day and felt as if he was going to pass out, this is all very similar to how he has felt during his previous V. Tach episodes. Patient states that the chest discomfort started around 6pm today. It was non-radiating in nature and he states that the pain would come and go. It is slightly reproducible on exam. He does have history of V. Tach with ICD firing and states he has had about 2 or 3 ablations in the past, thinks his last one was roughly 8-9 years ago. 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     On arrival to the ICU, he endorsed chest discomfort but denied SOB, abdominal pain, n/v, diarrhea/constipation, or urinary symptoms.  Chest discomfort improved quickly, cardiology is consulted, and recommended EP evaluation, device check, TTE.  Patient started on  amiodarone drip, Eliquis held and switched to heparin.  Continuing home medication.  Added spironolactone.  Replete electrolytes.  EP recommended 1G IV load, followed by 10G p.o. load and then followed by 400 Mg daily maintenance of amiodarone.  Patient completed IV amiodarone load and was transition to p.o.  Heparin switched back to Eliquis.  Patient asymptomatic, hemodynamically stable, states he is doing well.  Stable for discharge.    Pertinent Physical Exam At Time of Discharge  Physical Exam  Constitutional:       General: He is not in acute distress.  Cardiovascular:      Rate and Rhythm: Normal rate.      Heart sounds:      No friction rub. No gallop.   Pulmonary:      Effort: No respiratory distress.      Breath sounds: No stridor. No wheezing.   Abdominal:      Palpations: Abdomen is soft.   Skin:     General: Skin is dry.   Neurological:      Mental Status: He is alert and oriented to person, place, and time. Mental status is at baseline.   Psychiatric:         Mood and Affect: Mood normal.         Home Medications     Medication List      START taking these medications     potassium chloride 20 mEq packet; Commonly known as: Klor-Con; Take 20   mEq by mouth once daily.   spironolactone 25 mg tablet; Commonly known as: Aldactone; Take 1 tablet   (25 mg) by mouth once every 24 hours.; Start taking on: January 16, 2025     CHANGE how you take these medications     amiodarone 400 mg tablet; Commonly known as: Pacerone; Take 1 tablet   (400 mg) by mouth 2 times a day for 10 days, THEN 1 tablet (400 mg) once   daily.; Start taking on: January 15, 2025; What changed: medication   strength, See the new instructions.     CONTINUE taking these medications     apixaban 2.5 mg tablet; Commonly known as: Eliquis; Take 1 tablet (2.5   mg) by mouth 2 times a day.   carvedilol 25 mg tablet; Commonly known as: Coreg   furosemide 20 mg tablet; Commonly known as: Lasix   glipiZIDE XL 5 mg 24 hr tablet; Commonly known as:  Glucotrol XL   Jardiance 25 mg; Generic drug: empagliflozin   lansoprazole 15 mg DR capsule; Commonly known as: Prevacid   Lantus U-100 Insulin 100 unit/mL injection; Generic drug: insulin   glargine   levothyroxine 75 mcg tablet; Commonly known as: Synthroid, Levoxyl   lisinopril 10 mg tablet   * magnesium glycinate 100 mg magnesium capsule   * magnesium glycinate 100 mg magnesium capsule   oxyCODONE-acetaminophen 5-325 mg tablet; Commonly known as: Percocet   rosuvastatin 10 mg tablet; Commonly known as: Crestor   tamsulosin 0.4 mg 24 hr capsule; Commonly known as: Flomax  * This list has 2 medication(s) that are the same as other medications   prescribed for you. Read the directions carefully, and ask your doctor or   other care provider to review them with you.       Outpatient Follow-Up  Future Appointments   Date Time Provider Department Center   1/29/2025  1:20 PM Nicole Butler MD [address]   2/3/2025  3:00 PM Preston Elliott MD [address]       Miguel Garcia DO

## 2025-01-15 NOTE — PROGRESS NOTES
Occupational Therapy    Evaluation    Patient Name: Valentin Rojas  MRN: [id number]  Department: [facility] ICU  Room: 09/09-A  Today's Date: 1/15/2025  Time Calculation  Start Time: 1029  Stop Time: 1042  Time Calculation (min): 13 min    Assessment  IP OT Assessment  OT Assessment: Pt is an 88 yo M referred to occupational therapy for impaired self-care and functional mobility 2/2 hospitalization for SVT. Pt demonstrates near baseline functioning for ADL completion, functional mobility and functional transfers. No further acute OT needs at this time.  Prognosis: Good  Barriers to Discharge Home: No anticipated barriers  Evaluation/Treatment Tolerance: Patient tolerated treatment well  Medical Staff Made Aware: Yes  End of Session Communication: Bedside nurse  End of Session Patient Position: Up in chair, Alarm off, not on at start of session (no alarm needed per RN)  Plan:  Treatment Interventions: Functional transfer training, Endurance training  No Skilled OT: No acute OT goals identified  OT Frequency: OT eval only  OT Discharge Recommendations: No further acute OT  OT Recommended Transfer Status: Stand by assist, Assist of 1  OT - OK to Discharge: Yes (per OT POC)    Subjective   Current Problem:  1. SVT (supraventricular tachycardia) (CMS-HCC)  Transthoracic Echo (TTE) Complete    Transthoracic Echo (TTE) Complete    Referral to Cardiology      2. Ventricular tachyarrhythmia (Multi)  Referral to Cardiology      3. Ventricular tachycardia (paroxysmal) (Multi)  Transthoracic Echo (TTE) Complete    Transthoracic Echo (TTE) Complete    Referral to Cardiology      4. Palpitations  Referral to Cardiology    Referral to Cardiology      5. Heart failure with reduced ejection fraction  Referral to Cardiology    Referral to Cardiology      6. CHB (complete heart block)  Referral to Cardiology    Referral to Cardiology      7. Cardiomyopathy, nonischemic (Multi)  Referral to Cardiology    Referral to Cardiology      8. Cardiac  defibrillator in place  Referral to Cardiology    Referral to Cardiology      9. Acute on chronic systolic heart failure  Referral to Cardiology    Referral to Cardiology      10. Acute kidney injury superimposed on CKD (CMS-HCC)  Referral to Cardiology    Referral to Cardiology      11. Hypokalemia  potassium chloride (Klor-Con) 20 mEq packet        General:  General  Reason for Referral: Pt is an 88 yo M referred to occupational therapy for impaired self-care and functional mobility 2/2 hospitalization for SVT. Pt presented to [facility] w/ midsternal chest pain, admitted for management of recurrent v-tach/SVT  Referred By: Miguel Garcia DO  Past Medical History Relevant to Rehab: HFrEF/NICM (EF 36% 9/2024) s/p Medtronic ICD placement, complete heart block s/p CRT, CKD3, T2DM, HTN, HLD, GERD, BPH,  Family/Caregiver Present: Yes (Son present)  Co-Treatment: PT  Co-Treatment Reason: To maximize pt safety and outcomes  Prior to Session Communication: Bedside nurse  Patient Position Received: Up in chair, Alarm off, caregiver present  General Comment: Pt agreeable to therapy evaluation w/ encouragement. Pt cleared by RN prior to session.  Precautions:  Medical Precautions: Fall precautions  Precautions Comment: tele, IV, BP cuff, pulse ox    Vital Signs:  Pre-OT   HR - 70   O2 - 93%   BP - 117/86  Post-OT   HR - 73   O2 - 89 (room air, RN present)   BP - 123/83    Pain:  Pain Assessment  Pain Assessment: 0-10  0-10 (Numeric) Pain Score: 4 (at rest; 10/10 pain w/ activity)  Pain Type: Acute pain  Pain Location: Back  Pain Orientation: Lower  Pain Frequency: Constant/continuous  Pain Interventions: Repositioned, Ambulation/increased activity  Response to Interventions: Increase in pain (RN notified and aware)    Objective   Cognition:  Overall Cognitive Status: Within Functional Limits  Arousal/Alertness: Appropriate responses to stimuli  Orientation Level: Oriented X4    Home Living:  Type of Home: House  Lives With:  Spouse  Home Adaptive Equipment: Walker rolling or standard, Cane (rollator)  Home Layout: One level  Home Access: Stairs to enter with rails  Entrance Stairs-Rails: Right  Entrance Stairs-Number of Steps: 3  Bathroom Shower/Tub: Tub/shower unit  Bathroom Toilet: Standard  Bathroom Equipment: Grab bars in shower, Shower chair with back   Prior Function:  Level of [place]: Independent with ADLs and functional transfers, Independent with homemaking with ambulation  Receives Help From: Family  ADL Assistance: Independent  Homemaking Assistance: Independent (shared w/ wife)  Ambulatory Assistance: Independent (w/ rollator)  IADL History:  Homemaking Responsibilities: Yes  Meal Prep Responsibility: Secondary  Laundry Responsibility: Secondary  Cleaning Responsibility: Secondary  Shopping Responsibility: Secondary  ADL:  Eating Assistance: Independent  Grooming Assistance: Stand by  Bathing Assistance: Stand by  UE Dressing Assistance: Stand by  LE Dressing Assistance: Minimal  Toileting Assistance with Device: Stand by  Functional Assistance: Stand by  ADL Comments: Pt completed STS transfer and functional mobility w/ CGA and FWW. Pt also completed LE dressing w/ min A to thread over feet. Other ADLs anticipated.  Activity Tolerance:  Endurance: Tolerates 10 - 20 min exercise with multiple rests  Bed Mobility/Transfers:   Transfers  Transfer: Yes  Transfer 1  Transfer From 1: Chair with arms to  Transfer to 1: Stand  Technique 1: Sit to stand, Stand to sit  Transfer Device 1: Walker  Transfer Level of Assistance 1: Close supervision    Functional Mobility:  Functional Mobility  Functional Mobility Performed: Yes  Functional Mobility 1  Surface 1: Level tile  Device 1: Rolling walker  Assistance 1: Contact guard  Comments 1: Pt completed functional mobility in hallway w/ FWW and CGA  Sitting Balance:  Static Sitting Balance  Static Sitting-Balance Support: Feet supported  Static Sitting-Level of Assistance:  Independent  Dynamic Sitting Balance  Dynamic Sitting-Balance Support: Feet supported  Dynamic Sitting-Level of Assistance: Independent  Dynamic Sitting-Balance: Reaching for objects  Standing Balance:  Static Standing Balance  Static Standing-Balance Support: Bilateral upper extremity supported  Static Standing-Level of Assistance: Close supervision  Dynamic Standing Balance  Dynamic Standing-Balance Support: Bilateral upper extremity supported  Dynamic Standing-Level of Assistance: Close supervision    IADL's:   Homemaking Responsibilities: Yes  Meal Prep Responsibility: Secondary  Laundry Responsibility: Secondary  Cleaning Responsibility: Secondary  Shopping Responsibility: Secondary  Sensation:  Light Touch: No apparent deficits  Strength:  Strength Comments: BUE at least 4-/5 during functional activities  Coordination:  Movements are Fluid and Coordinated: Yes   Hand Function:  Hand Function  Gross Grasp: Functional  Coordination: Functional  Extremities:   RUE: Within Functional Limits  LUE: Within Functional Limits    Outcome Measures: [facility] Daily Activity  Putting on and taking off regular lower body clothing: A little  Bathing (including washing, rinsing, drying): A little  Putting on and taking off regular upper body clothing: None  Toileting, which includes using toilet, bedpan or urinal: A little  Taking care of personal grooming such as brushing teeth: A little  Eating Meals: None  Daily Activity - Total Score: 20    Education Documentation  Body Mechanics, taught by Alexandria Price OT at 1/15/2025 12:52 PM.  Learner: Patient  Readiness: Acceptance  Method: Explanation  Response: Verbalizes Understanding  Comment: Pt educated on safety and body mechanics when transferring and ADL completion using energy conservation based on the POC and DC recs    ADL Training, taught by Alexandria Price OT at 1/15/2025 12:52 PM.  Learner: Patient  Readiness: Acceptance  Method: Explanation  Response: Verbalizes  Understanding  Comment: Pt educated on safety and body mechanics when transferring and ADL completion using energy conservation based on the POC and DC recs

## 2025-01-15 NOTE — DISCHARGE INSTRUCTIONS
Please, take your home medications as instructed after being discharged from the hospital.     NEW MEDICATIONS:  START taking these medications     potassium chloride 20 mEq packet; Commonly known as: Klor-Con; Take 20   mEq by mouth once daily.   spironolactone 25 mg tablet; Commonly known as: Aldactone; Take 1 tablet   (25 mg) by mouth once every 24 hours.; Start taking on: January 16, 2025     CHANGE how you take these medications     amiodarone 400 mg tablet; Commonly known as: Pacerone; Take 1 tablet   (400 mg) by mouth 2 times a day for 10 days, THEN 1 tablet (400 mg) once   Daily        UPCOMING APPOINTMENTS:     Please, follow-up with your PCP, cardiologist & electrophysiologist within 7 to 14 days after leaving the hospital. /appointment services has been requested to make an appointment for you, however if you do not hear back from them within 1 to 2 days, please call your primary physician's office to schedule an appointment. Bring your photo ID and insurance card to your appointment.   [facility]  services can be reached at [phone].     If you experience any worsening symptoms or have any concerns, please contact your primary care provider to schedule an appointment. If you cannot get in touch with your primary care physician, please return to the nearest emergency room or urgent care clinic for an evaluation and treatment.     Thank you for choosing [facility] and allowing us to partake in your medical care!     - Your [facility] ICU.

## 2025-01-15 NOTE — CARE PLAN
The clinical goals for the shift include Heparin gtt will remain therapeutic and pt will start taking PO amio.    Problem: Safety - Adult  Goal: Free from fall injury  Outcome: Progressing  Problem: Pain  Goal: Takes deep breaths with improved pain control throughout the shift  Outcome: Progressing  Goal: Turns in bed with improved pain control throughout the shift  Outcome: Progressing  Goal: Walks with improved pain control throughout the shift  Outcome: Progressing  Goal: Performs ADL's with improved pain control throughout shift  Outcome: Progressing  Goal: Participates in PT with improved pain control throughout the shift  Outcome: Progressing  Problem: Arrythmia/Dysrhythmia  Goal: Lab values return to normal range  Outcome: Progressing  Goal: No evidence of post procedure complications  Outcome: Progressing  Goal: Promote self management  Outcome: Progressing  Goal: Serial ECG will return to baseline  Outcome: Progressing  Goal: Verbalize understanding of procedures/devices  Outcome: Progressing  Goal: Vital signs return to baseline  Outcome: Progressing  Goal: Care and maintenance of device (specify)  Outcome: Progressing  Problem: Skin  Goal: Decreased wound size/increased tissue granulation at next dressing change  Outcome: Progressing  Flowsheets (Taken 1/15/2025 0447)  Decreased wound size/increased tissue granulation at next dressing change: Promote sleep for wound healing  Goal: Participates in plan/prevention/treatment measures  Outcome: Progressing  Goal: Prevent/manage excess moisture  Outcome: Progressing  Goal: Prevent/minimize sheer/friction injuries  Outcome: Progressing  Goal: Promote/optimize nutrition  Outcome: Progressing  Goal: Promote skin healing  Outcome: Progressing

## 2025-01-15 NOTE — PROGRESS NOTES
Physical Therapy    Physical Therapy Evaluation    Patient Name: Valentin Rojas  MRN: [id number]  Department: [facility] ICU  Room: 09/09-A  Today's Date: 1/15/2025   Time Calculation  Start Time: 1028  Stop Time: 1042  Time Calculation (min): 14 min    Assessment/Plan   PT Assessment  PT Assessment Results: Decreased mobility, Pain  Rehab Prognosis: Good  Barriers to Discharge Home: No anticipated barriers  Evaluation/Treatment Tolerance: Patient limited by pain  Medical Staff Made Aware: Yes  End of Session Communication: Bedside nurse  End of Session Patient Position: Up in chair, Alarm off, not on at start of session (no alarm needed per RN)    Assessment:   Pt is an 89 y/o male who presents near baseline function, completing transfers and ambulation with FWW and supervision. Pt is eager to return home and adamantly declines further PT needs; will sign-off at this time.       IP OR SWING BED PT PLAN  Inpatient or Swing Bed: Inpatient  PT Plan  PT Plan: PT Eval only  PT Eval Only Reason: At baseline function  PT Frequency: PT eval only  PT Discharge Recommendations: No further acute PT, No PT needed after discharge  Equipment Recommended upon Discharge: Wheeled walker (owns rollator)  PT Recommended Transfer Status: Contact guard (with FWW)  PT - OK to Discharge: Yes    Subjective   General Visit Information:  General  Reason for Referral: Pt is an 88 yo male who presented to [facility] on 1/13/25 with midsternal chest pain. Pt has Medtronic pacemaker defibrillator, and company called him and advised calling 911. Pt was admitted to ICU for mgmt of recurrent v-tach/SVT and amiodarone drip. PT consulted for impaired mobility.  Past Medical History Relevant to Rehab: HFrEF/NICM (EF 36% 9/2024) s/p Medtronic ICD placement, complete heart block s/p CRT, CKD3, T2DM, HTN, HLD, GERD, BPH,  Family/Caregiver Present: Yes  Caregiver Feedback: Son present during session; encouraged pt to participate  Co-Treatment: OT  Co-Treatment  Reason: To maximize pt safety and participation  Prior to Session Communication: Bedside nurse  Patient Position Received: Up in chair, Alarm off, not on at start of session  General Comment: Pt agreeable to therapy evals with encouragement.  Home Living:  Home Living  Type of Home: House  Lives With: Spouse  Home Adaptive Equipment: Walker rolling or standard, Cane (Rollator)  Home Layout: One level  Home Access: Stairs to enter with rails  Entrance Stairs-Rails: Right  Entrance Stairs-Number of Steps: 3  Bathroom Shower/Tub: Tub/shower unit  Bathroom Equipment: Grab bars in shower, Shower chair with back  Prior Level of Function:  Prior Function Per Pt/Caregiver Report  Level of [place]: Independent with ADLs and functional transfers, Independent with homemaking with ambulation  Receives Help From: Family  ADL Assistance: Independent  Homemaking Assistance: Independent  Ambulatory Assistance: Independent (with rollator)  Prior Function Comments: (+) Driving. +Falls history. Pt reports 1 fall within the past 6 months. States he tripped and lost his balance. Denies LOC/head impact. Wife was present and assisted him into standing.  Precautions:  Precautions  Medical Precautions: Fall precautions  Precautions Comment: Cardiac monitoring, IV             Objective   Pain:  Pain Assessment  Pain Assessment: 0-10  0-10 (Numeric) Pain Score: 4 (at rest; 10/10 with activity)  Pain Type: Acute pain  Pain Location: Back  Pain Orientation: Lower  Pain Descriptors: Aching, Sore  Pain Frequency: Constant/continuous  Pain Interventions: Repositioned, Ambulation/increased activity  Response to Interventions: Increase in pain (RN notified)  Cognition:  Cognition  Overall Cognitive Status: Within Functional Limits  Orientation Level: Oriented X4    General Assessments:                  Activity Tolerance  Endurance: Tolerates 10 - 20 min exercise with multiple rests    Sensation  Light Touch: No apparent  deficits    Strength  Strength Comments: BLE WFL; at least 3/5  Static Sitting Balance  Static Sitting-Balance Support: Feet supported, No upper extremity supported  Static Sitting-Level of Assistance: Independent    Static Standing Balance  Static Standing-Balance Support: Bilateral upper extremity supported  Static Standing-Level of Assistance: Close supervision  Static Standing-Comment/Number of Minutes: with FWW  Functional Assessments:       Bed Mobility  Bed Mobility: No (pt sitting in chair at start/end of session)    Transfers  Transfer: Yes  Transfer 1  Technique 1: Sit to stand  Transfer Device 1: Walker  Transfer Level of Assistance 1: Close supervision  Transfers 2  Technique 2: Stand to sit  Transfer Device 2: Walker  Transfer Level of Assistance 2: Close supervision    Ambulation/Gait Training  Ambulation/Gait Training Performed: Yes  Ambulation/Gait Training 1  Surface 1: Level tile  Device 1: Rolling walker  Assistance 1: Close supervision  Quality of Gait 1: Narrow base of support, Decreased step length, Shuffling gait, Forward flexed posture  Comments/Distance (ft) 1: 60'; gait slow, but steady; demo safe use of device  Extremity/Trunk Assessments:  RLE   RLE : Within Functional Limits  LLE   LLE : Within Functional Limits  Outcome Measures:  [facility] Basic Mobility  Turning from your back to your side while in a flat bed without using bedrails: None  Moving from lying on your back to sitting on the side of a flat bed without using bedrails: None  Moving to and from bed to chair (including a wheelchair): A little  Standing up from a chair using your arms (e.g. wheelchair or bedside chair): A little  To walk in hospital room: A little  Climbing 3-5 steps with railing: A lot  Basic Mobility - Total Score: 19        Education Documentation  Body Mechanics, taught by Lupe Bennett, PT at 1/15/2025 12:38 PM.  Learner: Patient  Readiness: Acceptance  Method: Explanation, Demonstration  Response:  Verbalizes Understanding, Demonstrated Understanding    Mobility Training, taught by Lupe Bennett, PT at 1/15/2025 12:38 PM.  Learner: Patient  Readiness: Acceptance  Method: Explanation, Demonstration  Response: Verbalizes Understanding, Demonstrated Understanding    Education Comments  Role of acute care PT, discharge planning, hospital safety (use of call light, staff assist for all OOB mobility)

## 2025-01-15 NOTE — PROGRESS NOTES
Subjective Data:  Denies CP, palpations    Overnight Events:    No ventricular arrhythmias seen on telemetry for >24 hours     Objective Data:  Last Recorded Vitals:  Vitals:    01/15/25 0500 01/15/25 0600 01/15/25 0753 01/15/25 0830   BP: 116/85 120/82     Pulse: 70 70     Resp: 14 19     Temp:   36.6 °C (97.9 °F)    TempSrc:   Temporal    SpO2: 95% (!) 88%     Weight:  84.2 kg (185 lb 10 oz)  84.2 kg (185 lb 10 oz)   Height:           Last Labs:  CBC - 1/15/2025:  5:32 AM  7.4 14.9 156    46.9      CMP - 1/15/2025:  5:32 AM  8.3 7.7 21 --- 1.7   3.3 3.3 14 146      PTT - No results in last year.  _   _ _     TROPHS   Date/Time Value Ref Range Status   01/12/2025 10:48 PM 59 0 - 20 ng/L Final     Comment:     Previous result verified on 1/12/2025 2156 on specimen/case [id number] called with component [facility] for procedure Troponin I, High Sensitivity, Initial with value 66 ng/L.   01/12/2025 09:22 PM 66 0 - 20 ng/L Final     BNP   Date/Time Value Ref Range Status   01/12/2025 09:22 PM 1,216 0 - 99 pg/mL Final     HGBA1C   Date/Time Value Ref Range Status   08/29/2024 06:58 PM 7.7 4.7 - 6.4 % Final     Comment:     Interpretation:     Standardized A1c  Good control or normal:  4-6% ( mg/dL avg)  Moderate control:        6.1-8.0% (120-180 mg/dL avg)  Poor control:            >8.0% (180 mg/dL avg)  With 4% as a baseline, each 1% increase = 30 mg/dL increase in average   glucose.  Taken from DCCT (Diabetes Control Complications Trial)   08/20/2024 09:31 AM 8.1 4.7 - 6.4 % Final     Comment:     Interpretation:     Standardized A1c  Good control or normal:  4-6% ( mg/dL avg)  Moderate control:        6.1-8.0% (120-180 mg/dL avg)  Poor control:            >8.0% (180 mg/dL avg)  With 4% as a baseline, each 1% increase = 30 mg/dL increase in average   glucose.  Taken from DCCT (Diabetes Control Complications Trial)      Last I/O:  I/O last 3 completed shifts:  In: 1583.7 (18.8 mL/kg) [P.O.:750; I.V.:833.7  (9.9 mL/kg)]  Out: 2150 (25.5 mL/kg) [Urine:2150 (0.7 mL/kg/hr)]  Weight: 84.2 kg     Past Cardiology Tests (Last 3 Years):  EKG:  ECG 12 Lead 01/13/2025 (Preliminary)      ECG 12 lead 01/12/2025 (Preliminary)    Echo:  Transthoracic Echo (TTE) Complete 01/13/2025  CONCLUSIONS:   1. The left ventricular systolic function is severely decreased, with a visually estimated ejection fraction of 20-25%.   2. There is global hypokinesis of the left ventricle with minor regional variations.   3. Left ventricular cavity size is moderately dilated.   4. There is reduced right ventricular systolic function.   5. Severely enlarged right ventricle.   6. The left atrium is severely dilated.   7. The right atrium is moderately dilated.   8. Mild to moderate mitral valve regurgitation.   9. Mild to moderate tricuspid regurgitation visualized.  10. Right ventricular systolic pressure is within normal limits.  11. There is moderate pulmonic valve regurgitation.    Expand All Collapse All    Inpatient consult to Cardiology  Consult performed by: LAUREN Mon-CNP  Consult ordered by: Shelbie Swain DO           History Of Present Illness:    Valentin Rojas is a 89 y.o. male with PMH of HFrEF/NICM (EF 36% 9/2024) s/p CRT-D, CHB, CKD3, T2DM, HTN, HLD, GERD, BPH, hypothyroidism, V. Tach s/p ablation, A-fib (on Eliquis), CKD3a who presented to [facility] ED on 1/12/25 with midsternal chest discomfort and palpitations. For 2 weeks prior admission had been waking up with presyncope, severe palpitations. When EMS arrived, he was given nitroglycerin and took his own 324mg of aspirin. Per EMS, he was having runs of V tach x3 on the monitor. He follows with cardiology at [facility]. recently discharged on 12/28/24 after being admitted for HFrEF exacerbation. BNP on admission was 5230. He had stop taking his Lasix PTA. He was admitted at [facility] 9/2024 with ICD firing at which time Amiodarone was increased from 200 to 400  however patient opted against this at home. [facility] 9/2024 with nonobstructive CAD. Home medications include amiodarone, Eliquis, Coreg, Jardiance, Lasix, lisinopril, potassium, rosuvastatin. Labs significant for potassium 2.9, BUN 34, creatinine 1.72, BNP 1216, troponin 66, 59.      Review of Systems   Constitutional:  Positive for fatigue.   Respiratory:  Positive for shortness of breath.    Cardiovascular:  Positive for palpitations and leg swelling.   All other systems reviewed and are negative.        Last Recorded Vitals:  Vitals          Vitals:     01/13/25 0414 01/13/25 0500 01/13/25 0540 01/13/25 0600   BP:   121/82   119/82   BP Location:           Patient Position:           Pulse:   72   72   Resp:   15   18   Temp:           TempSrc:           SpO2: 96% 95%   96%   Weight:     85.1 kg (187 lb 9.8 oz)     Height:                    Last Labs:  CBC - 1/13/2025:  6:00 AM  5.2 13.8 154    44.3       CMP - 1/13/2025:  6:00 AM  8.2 7.7 21 --- 1.7   3.2 3.3 14 146       PTT - No results in last year.      _   _ _              Troponin I, High Sensitivity   Date/Time Value Ref Range Status   01/12/2025 10:48 PM 59 (HH) 0 - 20 ng/L Final       Comment:       Previous result verified on 1/12/2025 2156 on specimen/case [id number] called with component [facility] for procedure Troponin I, High Sensitivity, Initial with value 66 ng/L.   01/12/2025 09:22 PM 66 (HH) 0 - 20 ng/L Final            BNP   Date/Time Value Ref Range Status   01/12/2025 09:22 PM 1,216 (H) 0 - 99 pg/mL Final              Hemoglobin A1C   Date/Time Value Ref Range Status   08/29/2024 06:58 PM 7.7 (H) 4.7 - 6.4 % Final       Comment:       Interpretation:     Standardized A1c  Good control or normal:  4-6% ( mg/dL avg)  Moderate control:        6.1-8.0% (120-180 mg/dL avg)  Poor control:            >8.0% (180 mg/dL avg)  With 4% as a baseline, each 1% increase = 30 mg/dL increase in average   glucose.  Taken from DCCT (Diabetes Control  Complications Trial)   08/20/2024 09:31 AM 8.1 (H) 4.7 - 6.4 % Final       Comment:       Interpretation:     Standardized A1c  Good control or normal:  4-6% ( mg/dL avg)  Moderate control:        6.1-8.0% (120-180 mg/dL avg)  Poor control:            >8.0% (180 mg/dL avg)  With 4% as a baseline, each 1% increase = 30 mg/dL increase in average   glucose.  Taken from DCCT (Diabetes Control Complications Trial)      Last I/O:  I/O last 3 completed shifts:  In: 426.7 (5 mL/kg) [I.V.:426.7 (5 mL/kg)]  Out: 0 (0 mL/kg)   Weight: 85.1 kg      Past Cardiology Tests (Last 3 Years):  EKG:  No results found for this or any previous visit from the past 1095 days.     Echo:  9/3/2024 [facility]  CONCLUSIONS:   - Exam indication: Evaluation of known heart failure to guide therapy   - The left ventricle is normal in size. There is mild posterior left ventricular   hypertrophy. Left ventricular systolic function is moderately decreased. EF = 36 ±    5% (2D biplane) Left ventricular diastolic function was not evaluated due to   pacing.   - The right ventricle is dilated. Right ventricular systolic function is normal.   - The left atrial cavity is severely dilated.   - The right atrial cavity is mildly dilated.   - The visualized aorta is borderline dilated with a maximal dimension of 3.9 cm.   - There is moderate (2+) tricuspid valve regurgitation.   - Exam was compared with the prior CC echocardiographic exam performed on   2/14/2020, LV systolic function has improved ( it was 20%)        Ejection Fractions:  EF   Date/Time Value Ref Range Status   01/13/2025 09:30 AM 23 %      Cath:  ERVICE DATE: 9/3/2024   SERVICE TIME:  1:07 PM       CARDIOLOGIST: Amber Hurley MD   ATTENDING: Lorri Smith MD   PRIMARY CARE PHYSICIAN: Puja Barrera DO   REFERRING PROVIDER: Puja Barrera DO     [place] STUDY OF HEALTH and AGING SCALE:6=Moderately Frail     CARDIOVASCULAR INSTABILITY:No     PRE-PROCEDURE DIAGNOSIS:   Abnormal  Stress Test     POST PROCEDURE DIAGNOSIS:   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 Moderate sedation consisting of continuous ECG,   pulse oximetry and cardiopulmonary monitoring was performed by   the Cardiology Nurse, overseen by supervising physician, for an   intra-service time of 0 hr. 20 min.     Sedative Medications:   Drug: Versed   Dose: 1 mg   Route: IV     Drug: Fentanyl   Dose: 25 mcg   Route: IV   PRIORITY AT TIME OF PROCEDURE: Elective     SITE OF ENTRY: Radial:Right Radial     CONTRAST: Omnipaque 350 mg Iodine/mL (iohexol injection,   solution): 25 mL     ADDITIONAL PROCEDURES     CORONARY ANGIOGRAPHY:   The patient was taken to the cardiac cath lab where the entry   site was prepped and draped in a sterile manner. Under local   anesthesic with 2% Lidocaine, the vessel was cannulated with   Seldinger's technique using an arterial needle and a 6F sheath   was introduced.     Selective injections were made in the left and right coronary   arteries and various right, left and oblique views were obtained.       ADDITIONAL PROCEDURES     LEFT MAIN TRUNK: No Stenosis     LEFT ANTERIOR DESCENDIN% Stenosis     Location: Proximal      DIAGONAL #1: No Stenosis   DIAGONAL #2: No Stenosis     LEFT CIRCUMFLEX: 20% Stenosis    Location: Proximal     MARGINAL #1: No Stenosis     MARGINAL #2: No Stenosis     MARGINAL #3: No Stenosis     DOMINANT: YES   POSTERIOR DESCENDIN% Stenosis    Location: Proximal      RIGHT CORONARY: No Stenosis     DOMINANT: NO     Hemodynamics:   LVEDP: 8 mm Hg.   LV-Ao gradient : 0 mm Hg.   LVEF: Not done. LEVEF available by nuclear image. + CKD.       The sheath was removed and hemostatsis was established using   manual pressure using wrist band. There was no bleeding at the   end of the procedure.  The pt was returned to the recovery room in   a stable condition     ESTIMATED BLOOD LOSS: Less Than Minimal Unless Noted Here.     COMPLICATIONS: None     SPECIMENS: No specimens obtained unless noted here.     CONDITION: Stable     RECOMMENDATIONS: Follow protocol of post-op orders. Aggressive   modification of risk factors. Optimize Medical Management.   SIGNATURE: Amber Hurley MD PATIENT NAME: Valentin Rojas     Stress Test:  Nuclear Stress Test 08/30/2024  Nuclear Stress Test 08/30/2024  PATIENT:   Name: MR. VALENTIN ROJAS   MRN: [id number]   Age: 89 years   Gender: M     CONCLUSIONS:    1. SPECT Perfusion Study: Abnormal.    2. There is mild (<10%) ischemia in the territory of the LCX.    3. There is a small (<10%) fixed perfusion defect in the RCA territory.    4. Left ventricle is severely dilated. The left ventricle systolic   function is moderately decreased.    5. This is an intermediate risk scan.        LVEF % 34     Cardiac Imaging:  No results found for this or any previous visit from the past 1095 days.      Inpatient Medications:  Scheduled medications   Medication Dose Route Frequency    [Held by provider] amiodarone  200 mg oral Daily    carvedilol  25 mg oral TID    furosemide  60 mg oral Daily    insulin glargine  15 Units subcutaneous Nightly    insulin lispro  0-10 Units subcutaneous Before meals & nightly    levothyroxine  75 mcg oral q AM    lisinopril  10 mg oral Daily    perflutren protein A microsphere  0.5 mL intravenous Once in imaging    potassium chloride CR  20 mEq oral Daily    spironolactone  25 mg oral q24h VENKATESH    sulfur hexafluoride microsphr  2 mL intravenous Once in imaging    tamsulosin  0.4 mg oral Daily     PRN medications   Medication    acetaminophen    dextrose    dextrose    glucagon    glucagon    heparin    oxyCODONE    oxyCODONE    oxygen     Continuous Medications   Medication Dose Last Rate    heparin  0-4,500 Units/hr 900 Units/hr (01/14/25 4228)      Physical Exam  Vitals reviewed.   Constitutional:       Appearance: Normal appearance. He is normal weight.   HENT:      Head: Normocephalic and atraumatic.   Neck:      Vascular: No carotid bruit or JVD.   Cardiovascular:      Rate and Rhythm: Normal rate and regular rhythm.      Pulses: Normal pulses.      Heart sounds: Normal heart sounds.   Pulmonary:      Effort: Pulmonary effort is normal.      Breath sounds: Normal breath sounds.   Chest:      Chest wall: No tenderness.   Abdominal:      General: Abdomen is flat. Bowel sounds are normal.      Palpations: Abdomen is soft.   Skin:     General: Skin is warm and dry.   Neurological:      General: No focal deficit present.      Mental Status: He is alert and oriented to person, place, and time. Mental status is at baseline.   Psychiatric:         Mood and Affect: Mood normal.         Behavior: Behavior normal.          Assessment/Plan   Assessment  89 y.o. male with PMH of HFrEF/NICM (EF 36% 9/2024) s/p CRT-D, CHB, CKD3, T2DM, HTN, HLD, GERD, BPH, hypothyroidism, VT s/p ablation, A-fib (on Eliquis), CKD3a who presented to [facility] ED on 1/12/25 with midsternal chest discomfort and palpitations. Found to have nonsustained VT. C 9/2024 with nonobstructive CAD. TTE with LVEF 20-25%.     HFrEF/NICM (EF 36% 9/2024) s/p CRT-D  Nonsustained VT   Persistent atrial fibrillation  Hypokalemia - resolved   HTN  HLD  CKD3     Plan  -TTE with LVEF 20-25%  -EP recommended amiodarone 400 mg daily as maintenance dose - patient agrees to take 200 mg BID  -Stop amiodarone gtt  -Stop heparin gtt, resume Eliquis 2.5 mg BID (Age >80, creat >1.5)  -Continue Coreg 25 mg TID, Lasix 60 mg daily, lisinopril 10 mg daily  -Continue spironolactone 25 mg daily  -Resume home KCl 40 mEq daily  -OK for discharge today  -OP follow up with EP and cardiology    Peripheral IV 01/12/25 18 G Distal;Right Forearm (Active)   Site Assessment Clean;Intact;Dry 01/14/25 0808   Dressing Status Clean;Dry  01/14/25 0808   Number of days: 2       Peripheral IV 01/13/25 20 G Proximal;Right;Anterior Forearm (Active)   Site Assessment Dry;Intact;Clean 01/14/25 0808   Dressing Status Clean;Dry 01/14/25 0808   Number of days: 1       Code Status:  Full Code    I spent minutes in the professional and overall care of this patient.        Sarath Bourne, APRN-CNP

## 2025-01-15 NOTE — CARE PLAN
The patient's goals for the shift include      The clinical goals for the shift include pt will hemodynamically stable.

## 2025-01-17 NOTE — SIGNIFICANT EVENT
Follow Up Phone Call    Outgoing phone call    Spoke to: Valentin Rojas Relationship:self   Phone number: [phone]      Outcome: contacted patient/ family   Chief Complaint   Patient presents with    Chest Pain     Pt states that prior to calling 911 he started to experience midsternal chest pain. Pt has a medtronic pacemaker defibrillator in place. When EMS arrived, pt was given a nitroglycerin and he took his own 324mg of aspirin. He states it is just discomfort. Per EMS patient was having runs of VTACH x3 on their monitor.           Diagnosis:Not applicable    States he is feeling better. No further questions or concerns.

## 2025-01-21 LAB
ATRIAL RATE: 62 BPM
ATRIAL RATE: 77 BPM
Q ONSET: 189 MS
Q ONSET: 191 MS
QRS COUNT: 11 BEATS
QRS COUNT: 12 BEATS
QRS DURATION: 186 MS
QRS DURATION: 192 MS
QT INTERVAL: 522 MS
QT INTERVAL: 526 MS
QTC CALCULATION(BAZETT): 563 MS
QTC CALCULATION(BAZETT): 568 MS
QTC FREDERICIA: 549 MS
QTC FREDERICIA: 554 MS
R AXIS: 181 DEGREES
R AXIS: 199 DEGREES
T AXIS: 10 DEGREES
T AXIS: 33 DEGREES
T OFFSET: 452 MS
T OFFSET: 452 MS
VENTRICULAR RATE: 70 BPM
VENTRICULAR RATE: 70 BPM

## 2025-01-29 ENCOUNTER — APPOINTMENT (OUTPATIENT)
Dept: CARDIOLOGY | Facility: HOSPITAL | Age: 89
End: 2025-01-29
Payer: MEDICARE

## 2025-01-30 LAB
ATRIAL RATE: 105 BPM
Q ONSET: 189 MS
QRS COUNT: 18 BEATS
QRS DURATION: 188 MS
QT INTERVAL: 452 MS
QTC CALCULATION(BAZETT): 611 MS
QTC FREDERICIA: 553 MS
R AXIS: 213 DEGREES
T AXIS: 42 DEGREES
T OFFSET: 415 MS
VENTRICULAR RATE: 110 BPM

## 2025-01-31 NOTE — PROGRESS NOTES
[facility] Elect[facility]iology    Patient Name: Valentin Rojas  Patient : 1935    Referred for  VT    History of Present Illness:  Valenitn Rojas is a 89 y.o. year old male patient with:    VT: s/p ablation 2013, 2014, 10/03/2014.Admitted in 2020 for VT storm with 33 ICD shocks treated with and discharged on amiodarone, with recent admissions to [facility] for ICD shock.  HFrEF/NICM: s/p CRT-D upgrade 2013, generator change 2018.  Afib: s/p DCCV 10/2019.  CKD3a  T2DM  HTN  HLD  GERD  BPH  Hypothyroidism        Past Medical History:  He has no past medical history on file.    Past Surgical History:  He has no past surgical history on file.      Social History:  He reports that he has never smoked. He has never been exposed to tobacco smoke. He has never used smokeless tobacco. No history on file for alcohol use and drug use.    Family History:  No family history on file.     Allergies:  Penicillins    Outpatient Medications:  Current Outpatient Medications   Medication Instructions    amiodarone (Pacerone) 400 mg tablet Take 1 tablet (400 mg) by mouth 2 times a day for 10 days, THEN 1 tablet (400 mg) once daily.    apixaban (ELIQUIS) 2.5 mg, oral, 2 times daily    carvedilol (COREG) 25 mg, oral, 3 times daily    empagliflozin (JARDIANCE) 25 mg, Daily    furosemide (LASIX) 60 mg, Daily    glipiZIDE XL (GLUCOTROL XL) 5 mg, Daily    lansoprazole (PREVACID) 15 mg, oral, Daily PRN, Do not crush or chew.    Lantus U-100 Insulin 15 Units, Nightly    levothyroxine (SYNTHROID, LEVOXYL) 75 mcg, Daily RT    lisinopril 10 mg, Daily    magnesium glycinate 100 mg magnesium capsule 2 capsules, oral, Daily    magnesium glycinate 200 mg, Daily RT    oxyCODONE-acetaminophen (Percocet) 5-325 mg tablet 1 tablet, oral, Every 8 hours PRN    potassium chloride (Klor-Con) 20 mEq packet 20 mEq, oral, Daily    rosuvastatin (CRESTOR) 10 mg, oral, Daily    spironolactone (ALDACTONE) 25 mg, oral,  "Every 24 hours scheduled    tamsulosin (FLOMAX) 0.4 mg, Nightly        ROS:  A 14 point review of systems was done and is negative other than as stated in HPI    Physical Exam    Vitals:  There were no vitals taken for this visit.       Labs:   CBC  Lab Results   Component Value Date    WBC 7.4 01/15/2025    HGB 14.9 01/15/2025    HCT 46.9 01/15/2025    MCV 88 01/15/2025     01/15/2025        Renal Function Panel  Lab Results   Component Value Date    GLUCOSE 87 01/15/2025     01/15/2025    K 3.6 01/15/2025     01/15/2025    CO2 28 01/15/2025    ANIONGAP 15 01/15/2025    BUN 37 (H) 01/15/2025    CREATININE 1.92 (H) 01/15/2025    CALCIUM 8.3 (L) 01/15/2025        CMP  Lab Results   Component Value Date    CALCIUM 8.3 (L) 01/15/2025    PHOS 3.3 01/15/2025    PROT 7.7 01/12/2025    ALBUMIN 3.3 (L) 01/15/2025    AST 21 01/12/2025    ALT 14 01/12/2025    ALKPHOS 146 (H) 01/12/2025    BILITOT 1.7 (H) 01/12/2025       TSH  No results found for: \"TSH\", \"FREET4\", \"T3FREE\"       Cardiac Testing:  ECG  02/03/2025      Echocardiogram  01/13/2025  PHYSICIAN INTERPRETATION:  Left Ventricle: The left ventricular systolic function is severely decreased, with a visually estimated ejection fraction of 20-25%. There is mild eccentric left ventricular hypertrophy. There is global hypokinesis of the left ventricle with minor regional variations. The left ventricular cavity size is moderately dilated. There is mildly increased septal and normal posterior left ventricular wall thickness. Left ventricular diastolic filling cannot be determined, due to right ventricular pacing.  Left Atrium: The left atrium is severely dilated.  Right Ventricle: The right ventricle is severely enlarged. There is reduced right ventricular systolic function. A device is visualized in the right ventricle.  Right Atrium: The right atrium is moderately dilated. There is a device visualized in the right atrium.  Aortic Valve: The aortic valve " is trileaflet. There is mild aortic valve cusp calcification. The aortic valve dimensionless index is 0.67. There is no evidence of aortic valve regurgitation. The peak instantaneous gradient of the aortic valve is 2 mmHg. The mean gradient of the aortic valve is 1 mmHg.  Mitral Valve: The mitral valve is mild to moderately thickened. There is mild to moderate mitral valve regurgitation.  Tricuspid Valve: The tricuspid valve is structurally normal. There is mild to moderate tricuspid regurgitation. The Doppler estimated RVSP is within normal limits at 23.2 mmHg.  Pulmonic Valve: The pulmonic valve is structurally normal. There is moderate pulmonic valve regurgitation.  Pericardium: There is no pericardial effusion noted.  Aorta: The aortic root is normal.        CONCLUSIONS:   1. The left ventricular systolic function is severely decreased, with a visually estimated ejection fraction of 20-25%.   2. There is global hypokinesis of the left ventricle with minor regional variations.   3. Left ventricular cavity size is moderately dilated.   4. There is reduced right ventricular systolic function.   5. Severely enlarged right ventricle.   6. The left atrium is severely dilated.   7. The right atrium is moderately dilated.   8. Mild to moderate mitral valve regurgitation.   9. Mild to moderate tricuspid regurgitation visualized.  10. Right ventricular systolic pressure is within normal limits.  11. There is moderate pulmonic valve regurgitation.        Nuclear Stress Test  08/30/2024  CONCLUSIONS:    1. SPECT Perfusion Study: Abnormal.    2. There is mild (<10%) ischemia in the territory of the LCX.    3. There is a small (<10%) fixed perfusion defect in the RCA territory.    4. Left ventricle is severely dilated. The left ventricle systolic   function is moderately decreased.    5. This is an intermediate risk scan.        LVEF % 34       Assessment:       Plan:

## 2025-02-03 ENCOUNTER — APPOINTMENT (OUTPATIENT)
Dept: CARDIOLOGY | Facility: HOSPITAL | Age: 89
End: 2025-02-03
Payer: MEDICARE

## 2025-02-04 ENCOUNTER — APPOINTMENT (OUTPATIENT)
Dept: CARDIOLOGY | Facility: HOSPITAL | Age: 89
End: 2025-02-04
Payer: MEDICARE

## 2025-03-26 ENCOUNTER — APPOINTMENT (OUTPATIENT)
Dept: CARDIOLOGY | Facility: HOSPITAL | Age: 89
DRG: 309 | End: 2025-03-26
Payer: MEDICARE

## 2025-03-26 ENCOUNTER — APPOINTMENT (OUTPATIENT)
Dept: CARDIOLOGY | Facility: HOSPITAL | Age: 89
End: 2025-03-26
Payer: MEDICARE

## 2025-03-26 ENCOUNTER — HOSPITAL ENCOUNTER (INPATIENT)
Facility: HOSPITAL | Age: 89
LOS: 2 days | Discharge: HOME | DRG: 309 | End: 2025-03-29
Attending: STUDENT IN AN ORGANIZED HEALTH CARE EDUCATION/TRAINING PROGRAM | Admitting: FAMILY MEDICINE
Payer: MEDICARE

## 2025-03-26 ENCOUNTER — APPOINTMENT (OUTPATIENT)
Dept: RADIOLOGY | Facility: HOSPITAL | Age: 89
DRG: 309 | End: 2025-03-26
Payer: MEDICARE

## 2025-03-26 DIAGNOSIS — R07.9 CHEST PAIN, UNSPECIFIED TYPE: Primary | ICD-10-CM

## 2025-03-26 DIAGNOSIS — R07.89 CHEST PRESSURE: ICD-10-CM

## 2025-03-26 DIAGNOSIS — I50.23 ACUTE ON CHRONIC SYSTOLIC HEART FAILURE: ICD-10-CM

## 2025-03-26 DIAGNOSIS — E87.6 HYPOKALEMIA: ICD-10-CM

## 2025-03-26 DIAGNOSIS — I50.20 HEART FAILURE WITH REDUCED EJECTION FRACTION: ICD-10-CM

## 2025-03-26 LAB
ALBUMIN SERPL BCP-MCNC: 4.1 G/DL (ref 3.4–5)
ALP SERPL-CCNC: 145 U/L (ref 33–136)
ALT SERPL W P-5'-P-CCNC: 16 U/L (ref 10–52)
ANION GAP BLDV CALCULATED.4IONS-SCNC: 7 MMOL/L (ref 10–25)
ANION GAP SERPL CALC-SCNC: 13 MMOL/L (ref 10–20)
AST SERPL W P-5'-P-CCNC: 25 U/L (ref 9–39)
ATRIAL RATE: 72 BPM
BASE EXCESS BLDV CALC-SCNC: 7.7 MMOL/L (ref -2–3)
BASOPHILS # BLD AUTO: 0.03 X10*3/UL (ref 0–0.1)
BASOPHILS NFR BLD AUTO: 0.5 %
BILIRUB SERPL-MCNC: 1.7 MG/DL (ref 0–1.2)
BNP SERPL-MCNC: 1154 PG/ML (ref 0–99)
BODY TEMPERATURE: ABNORMAL
BUN SERPL-MCNC: 31 MG/DL (ref 6–23)
CA-I BLDV-SCNC: 1.18 MMOL/L (ref 1.1–1.33)
CALCIUM SERPL-MCNC: 9.2 MG/DL (ref 8.6–10.3)
CARDIAC TROPONIN I PNL SERPL HS: 23 NG/L (ref 0–20)
CARDIAC TROPONIN I PNL SERPL HS: 27 NG/L (ref 0–20)
CHLORIDE BLDV-SCNC: 101 MMOL/L (ref 98–107)
CHLORIDE SERPL-SCNC: 98 MMOL/L (ref 98–107)
CO2 SERPL-SCNC: 31 MMOL/L (ref 21–32)
CREAT SERPL-MCNC: 1.93 MG/DL (ref 0.5–1.3)
EGFRCR SERPLBLD CKD-EPI 2021: 33 ML/MIN/1.73M*2
EOSINOPHIL # BLD AUTO: 0.02 X10*3/UL (ref 0–0.4)
EOSINOPHIL NFR BLD AUTO: 0.4 %
ERYTHROCYTE [DISTWIDTH] IN BLOOD BY AUTOMATED COUNT: 19.9 % (ref 11.5–14.5)
FLUAV RNA RESP QL NAA+PROBE: NOT DETECTED
FLUBV RNA RESP QL NAA+PROBE: NOT DETECTED
GLUCOSE BLD MANUAL STRIP-MCNC: 183 MG/DL (ref 74–99)
GLUCOSE BLD MANUAL STRIP-MCNC: 236 MG/DL (ref 74–99)
GLUCOSE BLDV-MCNC: 214 MG/DL (ref 74–99)
GLUCOSE SERPL-MCNC: 183 MG/DL (ref 74–99)
HCO3 BLDV-SCNC: 33.3 MMOL/L (ref 22–26)
HCT VFR BLD AUTO: 48.9 % (ref 41–52)
HCT VFR BLD EST: 44 % (ref 41–52)
HGB BLD-MCNC: 15.8 G/DL (ref 13.5–17.5)
HGB BLDV-MCNC: 14.5 G/DL (ref 13.5–17.5)
IMM GRANULOCYTES # BLD AUTO: 0.01 X10*3/UL (ref 0–0.5)
IMM GRANULOCYTES NFR BLD AUTO: 0.2 % (ref 0–0.9)
INHALED O2 CONCENTRATION: 2 %
LACTATE BLDV-SCNC: 1 MMOL/L (ref 0.4–2)
LYMPHOCYTES # BLD AUTO: 1.16 X10*3/UL (ref 0.8–3)
LYMPHOCYTES NFR BLD AUTO: 20.8 %
MAGNESIUM SERPL-MCNC: 2.29 MG/DL (ref 1.6–2.4)
MCH RBC QN AUTO: 27.8 PG (ref 26–34)
MCHC RBC AUTO-ENTMCNC: 32.3 G/DL (ref 32–36)
MCV RBC AUTO: 86 FL (ref 80–100)
MONOCYTES # BLD AUTO: 0.38 X10*3/UL (ref 0.05–0.8)
MONOCYTES NFR BLD AUTO: 6.8 %
NEUTROPHILS # BLD AUTO: 3.99 X10*3/UL (ref 1.6–5.5)
NEUTROPHILS NFR BLD AUTO: 71.3 %
NRBC BLD-RTO: 0 /100 WBCS (ref 0–0)
OXYHGB MFR BLDV: 63.9 % (ref 45–75)
PCO2 BLDV: 49 MM HG (ref 41–51)
PH BLDV: 7.44 PH (ref 7.33–7.43)
PLATELET # BLD AUTO: 164 X10*3/UL (ref 150–450)
PO2 BLDV: 37 MM HG (ref 35–45)
POTASSIUM BLDV-SCNC: 3.9 MMOL/L (ref 3.5–5.3)
POTASSIUM SERPL-SCNC: 3.9 MMOL/L (ref 3.5–5.3)
PROT SERPL-MCNC: 7.3 G/DL (ref 6.4–8.2)
Q ONSET: 191 MS
QRS COUNT: 12 BEATS
QRS DURATION: 164 MS
QT INTERVAL: 538 MS
QTC CALCULATION(BAZETT): 589 MS
QTC FREDERICIA: 572 MS
R AXIS: -64 DEGREES
RBC # BLD AUTO: 5.68 X10*6/UL (ref 4.5–5.9)
RSV RNA RESP QL NAA+PROBE: NOT DETECTED
SAO2 % BLDV: 65 % (ref 45–75)
SARS-COV-2 RNA RESP QL NAA+PROBE: NOT DETECTED
SODIUM BLDV-SCNC: 137 MMOL/L (ref 136–145)
SODIUM SERPL-SCNC: 138 MMOL/L (ref 136–145)
T AXIS: 144 DEGREES
T OFFSET: 460 MS
T4 FREE SERPL-MCNC: 1.54 NG/DL (ref 0.61–1.12)
TSH SERPL-ACNC: 6.71 MIU/L (ref 0.44–3.98)
VENTRICULAR RATE: 72 BPM
WBC # BLD AUTO: 5.6 X10*3/UL (ref 4.4–11.3)

## 2025-03-26 PROCEDURE — 82805 BLOOD GASES W/O2 SATURATION: CPT | Performed by: PHYSICIAN ASSISTANT

## 2025-03-26 PROCEDURE — 85025 COMPLETE CBC W/AUTO DIFF WBC: CPT | Performed by: PHYSICIAN ASSISTANT

## 2025-03-26 PROCEDURE — 2500000001 HC RX 250 WO HCPCS SELF ADMINISTERED DRUGS (ALT 637 FOR MEDICARE OP): Performed by: PHYSICIAN ASSISTANT

## 2025-03-26 PROCEDURE — 83880 ASSAY OF NATRIURETIC PEPTIDE: CPT | Performed by: PHYSICIAN ASSISTANT

## 2025-03-26 PROCEDURE — 84439 ASSAY OF FREE THYROXINE: CPT | Performed by: PHYSICIAN ASSISTANT

## 2025-03-26 PROCEDURE — 99223 1ST HOSP IP/OBS HIGH 75: CPT | Performed by: PHYSICIAN ASSISTANT

## 2025-03-26 PROCEDURE — G0378 HOSPITAL OBSERVATION PER HR: HCPCS

## 2025-03-26 PROCEDURE — 84075 ASSAY ALKALINE PHOSPHATASE: CPT | Performed by: PHYSICIAN ASSISTANT

## 2025-03-26 PROCEDURE — 83735 ASSAY OF MAGNESIUM: CPT | Performed by: PHYSICIAN ASSISTANT

## 2025-03-26 PROCEDURE — 71250 CT THORAX DX C-: CPT

## 2025-03-26 PROCEDURE — 2500000002 HC RX 250 W HCPCS SELF ADMINISTERED DRUGS (ALT 637 FOR MEDICARE OP, ALT 636 FOR OP/ED): Performed by: PHYSICIAN ASSISTANT

## 2025-03-26 PROCEDURE — 82947 ASSAY GLUCOSE BLOOD QUANT: CPT

## 2025-03-26 PROCEDURE — 2500000005 HC RX 250 GENERAL PHARMACY W/O HCPCS: Performed by: PHYSICIAN ASSISTANT

## 2025-03-26 PROCEDURE — 84443 ASSAY THYROID STIM HORMONE: CPT | Performed by: PHYSICIAN ASSISTANT

## 2025-03-26 PROCEDURE — 87637 SARSCOV2&INF A&B&RSV AMP PRB: CPT | Performed by: PHYSICIAN ASSISTANT

## 2025-03-26 PROCEDURE — 93005 ELECTROCARDIOGRAM TRACING: CPT

## 2025-03-26 PROCEDURE — 96374 THER/PROPH/DIAG INJ IV PUSH: CPT

## 2025-03-26 PROCEDURE — 99285 EMERGENCY DEPT VISIT HI MDM: CPT | Mod: 25 | Performed by: STUDENT IN AN ORGANIZED HEALTH CARE EDUCATION/TRAINING PROGRAM

## 2025-03-26 PROCEDURE — 84484 ASSAY OF TROPONIN QUANT: CPT | Performed by: PHYSICIAN ASSISTANT

## 2025-03-26 PROCEDURE — 71045 X-RAY EXAM CHEST 1 VIEW: CPT

## 2025-03-26 PROCEDURE — 2500000004 HC RX 250 GENERAL PHARMACY W/ HCPCS (ALT 636 FOR OP/ED): Performed by: PHYSICIAN ASSISTANT

## 2025-03-26 PROCEDURE — 36415 COLL VENOUS BLD VENIPUNCTURE: CPT | Performed by: PHYSICIAN ASSISTANT

## 2025-03-26 PROCEDURE — 71250 CT THORAX DX C-: CPT | Performed by: RADIOLOGY

## 2025-03-26 RX ORDER — FUROSEMIDE 10 MG/ML
20 INJECTION INTRAMUSCULAR; INTRAVENOUS ONCE
Status: COMPLETED | OUTPATIENT
Start: 2025-03-26 | End: 2025-03-26

## 2025-03-26 RX ORDER — FUROSEMIDE 40 MG/1
40 TABLET ORAL EVERY MORNING
Status: DISCONTINUED | OUTPATIENT
Start: 2025-03-27 | End: 2025-03-29 | Stop reason: HOSPADM

## 2025-03-26 RX ORDER — DEXTROSE 50 % IN WATER (D50W) INTRAVENOUS SYRINGE
12.5
Status: DISCONTINUED | OUTPATIENT
Start: 2025-03-26 | End: 2025-03-29 | Stop reason: HOSPADM

## 2025-03-26 RX ORDER — LEVOTHYROXINE SODIUM 75 UG/1
75 TABLET ORAL
Status: DISCONTINUED | OUTPATIENT
Start: 2025-03-27 | End: 2025-03-29 | Stop reason: HOSPADM

## 2025-03-26 RX ORDER — FUROSEMIDE 20 MG/1
20 TABLET ORAL DAILY
Status: DISCONTINUED | OUTPATIENT
Start: 2025-03-27 | End: 2025-03-29

## 2025-03-26 RX ORDER — POLYETHYLENE GLYCOL 3350 17 G/17G
17 POWDER, FOR SOLUTION ORAL DAILY
Status: DISCONTINUED | OUTPATIENT
Start: 2025-03-26 | End: 2025-03-29 | Stop reason: HOSPADM

## 2025-03-26 RX ORDER — TALC
3 POWDER (GRAM) TOPICAL NIGHTLY PRN
Status: DISCONTINUED | OUTPATIENT
Start: 2025-03-26 | End: 2025-03-29 | Stop reason: HOSPADM

## 2025-03-26 RX ORDER — DEXTROSE 50 % IN WATER (D50W) INTRAVENOUS SYRINGE
25
Status: DISCONTINUED | OUTPATIENT
Start: 2025-03-26 | End: 2025-03-29 | Stop reason: HOSPADM

## 2025-03-26 RX ORDER — TAMSULOSIN HYDROCHLORIDE 0.4 MG/1
0.4 CAPSULE ORAL NIGHTLY
Status: DISCONTINUED | OUTPATIENT
Start: 2025-03-26 | End: 2025-03-29 | Stop reason: HOSPADM

## 2025-03-26 RX ORDER — ACETAMINOPHEN 160 MG/5ML
650 SOLUTION ORAL EVERY 4 HOURS PRN
Status: DISCONTINUED | OUTPATIENT
Start: 2025-03-26 | End: 2025-03-29 | Stop reason: HOSPADM

## 2025-03-26 RX ORDER — PANTOPRAZOLE SODIUM 40 MG/10ML
40 INJECTION, POWDER, LYOPHILIZED, FOR SOLUTION INTRAVENOUS
Status: DISCONTINUED | OUTPATIENT
Start: 2025-03-27 | End: 2025-03-28

## 2025-03-26 RX ORDER — OXYCODONE AND ACETAMINOPHEN 5; 325 MG/1; MG/1
1 TABLET ORAL EVERY 8 HOURS PRN
Status: DISCONTINUED | OUTPATIENT
Start: 2025-03-26 | End: 2025-03-29 | Stop reason: HOSPADM

## 2025-03-26 RX ORDER — AMIODARONE HYDROCHLORIDE 200 MG/1
200 TABLET ORAL NIGHTLY
Status: DISCONTINUED | OUTPATIENT
Start: 2025-03-26 | End: 2025-03-27

## 2025-03-26 RX ORDER — ONDANSETRON HYDROCHLORIDE 2 MG/ML
4 INJECTION, SOLUTION INTRAVENOUS EVERY 8 HOURS PRN
Status: DISCONTINUED | OUTPATIENT
Start: 2025-03-26 | End: 2025-03-29 | Stop reason: HOSPADM

## 2025-03-26 RX ORDER — INSULIN LISPRO 100 [IU]/ML
0-10 INJECTION, SOLUTION INTRAVENOUS; SUBCUTANEOUS
Status: DISCONTINUED | OUTPATIENT
Start: 2025-03-27 | End: 2025-03-29 | Stop reason: HOSPADM

## 2025-03-26 RX ORDER — SPIRONOLACTONE 25 MG/1
25 TABLET ORAL
Status: DISCONTINUED | OUTPATIENT
Start: 2025-03-27 | End: 2025-03-29 | Stop reason: HOSPADM

## 2025-03-26 RX ORDER — ACETAMINOPHEN 650 MG/1
650 SUPPOSITORY RECTAL EVERY 4 HOURS PRN
Status: DISCONTINUED | OUTPATIENT
Start: 2025-03-26 | End: 2025-03-29 | Stop reason: HOSPADM

## 2025-03-26 RX ORDER — ALBUTEROL SULFATE 90 UG/1
2 INHALANT RESPIRATORY (INHALATION) EVERY 6 HOURS PRN
Status: DISCONTINUED | OUTPATIENT
Start: 2025-03-26 | End: 2025-03-26 | Stop reason: CLARIF

## 2025-03-26 RX ORDER — ROSUVASTATIN CALCIUM 10 MG/1
10 TABLET, COATED ORAL NIGHTLY
Status: DISCONTINUED | OUTPATIENT
Start: 2025-03-26 | End: 2025-03-29 | Stop reason: HOSPADM

## 2025-03-26 RX ORDER — ACETAMINOPHEN 325 MG/1
650 TABLET ORAL EVERY 4 HOURS PRN
Status: DISCONTINUED | OUTPATIENT
Start: 2025-03-26 | End: 2025-03-29 | Stop reason: HOSPADM

## 2025-03-26 RX ORDER — ALBUTEROL SULFATE 0.83 MG/ML
2.5 SOLUTION RESPIRATORY (INHALATION) EVERY 6 HOURS PRN
Status: DISCONTINUED | OUTPATIENT
Start: 2025-03-26 | End: 2025-03-27

## 2025-03-26 RX ORDER — ONDANSETRON 4 MG/1
4 TABLET, FILM COATED ORAL EVERY 8 HOURS PRN
Status: DISCONTINUED | OUTPATIENT
Start: 2025-03-26 | End: 2025-03-29 | Stop reason: HOSPADM

## 2025-03-26 RX ORDER — FUROSEMIDE 20 MG/1
20 TABLET ORAL DAILY
Status: ON HOLD | COMMUNITY
End: 2025-03-29

## 2025-03-26 RX ORDER — LISINOPRIL 10 MG/1
10 TABLET ORAL DAILY
Status: DISCONTINUED | OUTPATIENT
Start: 2025-03-26 | End: 2025-03-29 | Stop reason: HOSPADM

## 2025-03-26 RX ORDER — CARVEDILOL 25 MG/1
25 TABLET ORAL 2 TIMES DAILY
Status: DISCONTINUED | OUTPATIENT
Start: 2025-03-26 | End: 2025-03-29 | Stop reason: HOSPADM

## 2025-03-26 RX ORDER — PANTOPRAZOLE SODIUM 40 MG/1
40 TABLET, DELAYED RELEASE ORAL
Status: DISCONTINUED | OUTPATIENT
Start: 2025-03-27 | End: 2025-03-29 | Stop reason: HOSPADM

## 2025-03-26 RX ORDER — INSULIN GLARGINE 100 [IU]/ML
15 INJECTION, SOLUTION SUBCUTANEOUS NIGHTLY
Status: DISCONTINUED | OUTPATIENT
Start: 2025-03-26 | End: 2025-03-29 | Stop reason: HOSPADM

## 2025-03-26 RX ADMIN — TAMSULOSIN HYDROCHLORIDE 0.4 MG: 0.4 CAPSULE ORAL at 20:14

## 2025-03-26 RX ADMIN — Medication 2 L/MIN: at 12:36

## 2025-03-26 RX ADMIN — LISINOPRIL 10 MG: 10 TABLET ORAL at 18:08

## 2025-03-26 RX ADMIN — AMIODARONE HYDROCHLORIDE 200 MG: 200 TABLET ORAL at 20:15

## 2025-03-26 RX ADMIN — ROSUVASTATIN CALCIUM 10 MG: 10 TABLET, FILM COATED ORAL at 20:14

## 2025-03-26 RX ADMIN — CARVEDILOL 25 MG: 25 TABLET, FILM COATED ORAL at 20:14

## 2025-03-26 RX ADMIN — OXYCODONE HYDROCHLORIDE AND ACETAMINOPHEN 1 TABLET: 5; 325 TABLET ORAL at 22:55

## 2025-03-26 RX ADMIN — INSULIN GLARGINE 15 UNITS: 100 INJECTION, SOLUTION SUBCUTANEOUS at 20:14

## 2025-03-26 RX ADMIN — APIXABAN 2.5 MG: 2.5 TABLET, FILM COATED ORAL at 20:14

## 2025-03-26 RX ADMIN — FUROSEMIDE 20 MG: 10 INJECTION, SOLUTION INTRAMUSCULAR; INTRAVENOUS at 13:08

## 2025-03-26 SDOH — ECONOMIC STABILITY: TRANSPORTATION INSECURITY: IN THE PAST 12 MONTHS, HAS LACK OF TRANSPORTATION KEPT YOU FROM MEDICAL APPOINTMENTS OR FROM GETTING MEDICATIONS?: NO

## 2025-03-26 SDOH — ECONOMIC STABILITY: INCOME INSECURITY: IN THE PAST 12 MONTHS HAS THE ELECTRIC, GAS, OIL, OR WATER COMPANY THREATENED TO SHUT OFF SERVICES IN YOUR HOME?: NO

## 2025-03-26 SDOH — ECONOMIC STABILITY: FOOD INSECURITY: WITHIN THE PAST 12 MONTHS, YOU WORRIED THAT YOUR FOOD WOULD RUN OUT BEFORE YOU GOT THE MONEY TO BUY MORE.: NEVER TRUE

## 2025-03-26 SDOH — SOCIAL STABILITY: SOCIAL INSECURITY: HAVE YOU HAD ANY THOUGHTS OF HARMING ANYONE ELSE?: NO

## 2025-03-26 SDOH — ECONOMIC STABILITY: FOOD INSECURITY: WITHIN THE PAST 12 MONTHS, THE FOOD YOU BOUGHT JUST DIDN'T LAST AND YOU DIDN'T HAVE MONEY TO GET MORE.: NEVER TRUE

## 2025-03-26 SDOH — SOCIAL STABILITY: SOCIAL INSECURITY: ARE THERE ANY APPARENT SIGNS OF INJURIES/BEHAVIORS THAT COULD BE RELATED TO ABUSE/NEGLECT?: NO

## 2025-03-26 SDOH — SOCIAL STABILITY: SOCIAL INSECURITY: WITHIN THE LAST YEAR, HAVE YOU BEEN HUMILIATED OR EMOTIONALLY ABUSED IN OTHER WAYS BY YOUR PARTNER OR EX-PARTNER?: NO

## 2025-03-26 SDOH — SOCIAL STABILITY: SOCIAL INSECURITY: HAS ANYONE EVER THREATENED TO HURT YOUR FAMILY OR YOUR PETS?: NO

## 2025-03-26 SDOH — SOCIAL STABILITY: SOCIAL INSECURITY: WITHIN THE LAST YEAR, HAVE YOU BEEN AFRAID OF YOUR PARTNER OR EX-PARTNER?: NO

## 2025-03-26 SDOH — ECONOMIC STABILITY: HOUSING INSECURITY: IN THE PAST 12 MONTHS, HOW MANY TIMES HAVE YOU MOVED WHERE YOU WERE LIVING?: 0

## 2025-03-26 SDOH — SOCIAL STABILITY: SOCIAL INSECURITY: DO YOU FEEL UNSAFE GOING BACK TO THE PLACE WHERE YOU ARE LIVING?: NO

## 2025-03-26 SDOH — SOCIAL STABILITY: SOCIAL INSECURITY: HAVE YOU HAD THOUGHTS OF HARMING ANYONE ELSE?: NO

## 2025-03-26 SDOH — ECONOMIC STABILITY: FOOD INSECURITY: HOW HARD IS IT FOR YOU TO PAY FOR THE VERY BASICS LIKE FOOD, HOUSING, MEDICAL CARE, AND HEATING?: NOT HARD AT ALL

## 2025-03-26 SDOH — SOCIAL STABILITY: SOCIAL INSECURITY: DOES ANYONE TRY TO KEEP YOU FROM HAVING/CONTACTING OTHER FRIENDS OR DOING THINGS OUTSIDE YOUR HOME?: NO

## 2025-03-26 SDOH — SOCIAL STABILITY: SOCIAL INSECURITY: WERE YOU ABLE TO COMPLETE ALL THE BEHAVIORAL HEALTH SCREENINGS?: YES

## 2025-03-26 SDOH — SOCIAL STABILITY: SOCIAL INSECURITY: DO YOU FEEL ANYONE HAS EXPLOITED OR TAKEN ADVANTAGE OF YOU FINANCIALLY OR OF YOUR PERSONAL PROPERTY?: NO

## 2025-03-26 SDOH — SOCIAL STABILITY: SOCIAL INSECURITY: ARE YOU OR HAVE YOU BEEN THREATENED OR ABUSED PHYSICALLY, EMOTIONALLY, OR SEXUALLY BY ANYONE?: NO

## 2025-03-26 SDOH — SOCIAL STABILITY: SOCIAL INSECURITY: ABUSE: ADULT

## 2025-03-26 SDOH — ECONOMIC STABILITY: HOUSING INSECURITY: AT ANY TIME IN THE PAST 12 MONTHS, WERE YOU HOMELESS OR LIVING IN A SHELTER (INCLUDING NOW)?: NO

## 2025-03-26 SDOH — ECONOMIC STABILITY: HOUSING INSECURITY: IN THE LAST 12 MONTHS, WAS THERE A TIME WHEN YOU WERE NOT ABLE TO PAY THE MORTGAGE OR RENT ON TIME?: NO

## 2025-03-26 ASSESSMENT — ENCOUNTER SYMPTOMS
SHORTNESS OF BREATH: 1
HEADACHES: 0
ABDOMINAL PAIN: 0
MYALGIAS: 0
COUGH: 0
PALPITATIONS: 0
CONSTIPATION: 0
HEMATURIA: 0
SORE THROAT: 0
CHEST TIGHTNESS: 1
EYE REDNESS: 0
DIAPHORESIS: 0
FLANK PAIN: 0
DIZZINESS: 0
WHEEZING: 0
FACIAL ASYMMETRY: 0
TROUBLE SWALLOWING: 0
SEIZURES: 0
WOUND: 0
WEAKNESS: 0
NAUSEA: 0
NUMBNESS: 0
RHINORRHEA: 0
ARTHRALGIAS: 1
FREQUENCY: 0
UNEXPECTED WEIGHT CHANGE: 0
DIARRHEA: 0
PHOTOPHOBIA: 0
CONFUSION: 0
FEVER: 0
LIGHT-HEADEDNESS: 0
DECREASED CONCENTRATION: 0
HALLUCINATIONS: 0
SLEEP DISTURBANCE: 0
VOMITING: 0
DIFFICULTY URINATING: 0
DYSURIA: 0

## 2025-03-26 ASSESSMENT — PAIN DESCRIPTION - DESCRIPTORS
DESCRIPTORS: PRESSURE
DESCRIPTORS: PRESSURE

## 2025-03-26 ASSESSMENT — COGNITIVE AND FUNCTIONAL STATUS - GENERAL
CLIMB 3 TO 5 STEPS WITH RAILING: A LITTLE
DAILY ACTIVITIY SCORE: 24
PATIENT BASELINE BEDBOUND: NO
MOBILITY SCORE: 22
WALKING IN HOSPITAL ROOM: A LITTLE

## 2025-03-26 ASSESSMENT — LIFESTYLE VARIABLES
HAVE PEOPLE ANNOYED YOU BY CRITICIZING YOUR DRINKING: NO
HOW OFTEN DO YOU HAVE 6 OR MORE DRINKS ON ONE OCCASION: NEVER
EVER HAD A DRINK FIRST THING IN THE MORNING TO STEADY YOUR NERVES TO GET RID OF A HANGOVER: NO
TOTAL SCORE: 0
HOW OFTEN DO YOU HAVE A DRINK CONTAINING ALCOHOL: NEVER
HAVE YOU EVER FELT YOU SHOULD CUT DOWN ON YOUR DRINKING: NO
AUDIT-C TOTAL SCORE: 0
EVER FELT BAD OR GUILTY ABOUT YOUR DRINKING: NO
SKIP TO QUESTIONS 9-10: 1
AUDIT-C TOTAL SCORE: 0
HOW MANY STANDARD DRINKS CONTAINING ALCOHOL DO YOU HAVE ON A TYPICAL DAY: PATIENT DOES NOT DRINK

## 2025-03-26 ASSESSMENT — ACTIVITIES OF DAILY LIVING (ADL)
HEARING - RIGHT EAR: FUNCTIONAL
WALKS IN HOME: NEEDS ASSISTANCE
GROOMING: INDEPENDENT
TOILETING: INDEPENDENT
HEARING - LEFT EAR: FUNCTIONAL
GROOMING: INDEPENDENT
JUDGMENT_ADEQUATE_SAFELY_COMPLETE_DAILY_ACTIVITIES: YES
JUDGMENT_ADEQUATE_SAFELY_COMPLETE_DAILY_ACTIVITIES: YES
DRESSING YOURSELF: INDEPENDENT
HEARING - LEFT EAR: FUNCTIONAL
LACK_OF_TRANSPORTATION: NO
FEEDING YOURSELF: INDEPENDENT
ADEQUATE_TO_COMPLETE_ADL: YES
PATIENT'S MEMORY ADEQUATE TO SAFELY COMPLETE DAILY ACTIVITIES?: YES
WALKS IN HOME: NEEDS ASSISTANCE
TOILETING: INDEPENDENT
BATHING: INDEPENDENT
DRESSING YOURSELF: INDEPENDENT
ASSISTIVE_DEVICE: EYEGLASSES;DENTURES UPPER;DENTURES LOWER;WALKER
LACK_OF_TRANSPORTATION: NO
ADEQUATE_TO_COMPLETE_ADL: YES
LACK_OF_TRANSPORTATION: NO
HEARING - RIGHT EAR: FUNCTIONAL
ASSISTIVE_DEVICE: DENTURES LOWER;DENTURES UPPER;EYEGLASSES;WALKER
FEEDING YOURSELF: INDEPENDENT
BATHING: INDEPENDENT

## 2025-03-26 ASSESSMENT — PATIENT HEALTH QUESTIONNAIRE - PHQ9
2. FEELING DOWN, DEPRESSED OR HOPELESS: NOT AT ALL
1. LITTLE INTEREST OR PLEASURE IN DOING THINGS: NOT AT ALL
SUM OF ALL RESPONSES TO PHQ9 QUESTIONS 1 & 2: 0

## 2025-03-26 ASSESSMENT — PAIN SCALES - GENERAL
PAINLEVEL_OUTOF10: 0 - NO PAIN
PAINLEVEL_OUTOF10: 0 - NO PAIN
PAINLEVEL_OUTOF10: 7
PAINLEVEL_OUTOF10: 0 - NO PAIN

## 2025-03-26 ASSESSMENT — PAIN - FUNCTIONAL ASSESSMENT
PAIN_FUNCTIONAL_ASSESSMENT: 0-10

## 2025-03-26 ASSESSMENT — HEART SCORE
AGE: 65+
HISTORY: SLIGHTLY SUSPICIOUS
RISK FACTORS: >2 RISK FACTORS OR HX OF ATHEROSCLEROTIC DISEASE
ECG: NORMAL
HEART SCORE: 5
TROPONIN: 1-3 TIMES NORMAL LIMIT

## 2025-03-26 ASSESSMENT — PAIN DESCRIPTION - LOCATION: LOCATION: KNEE

## 2025-03-26 NOTE — PROGRESS NOTES
03/26/25 1607   Discharge Planning   Living Arrangements Spouse/significant other   Support Systems Spouse/significant other;Family members;Friends/neighbors   Assistance Needed A&OX4; independent with ADLs with cane and walker; drives; room air baseline-currently 2L NC; PCP Dr Puja Barrera; on Eliquis prior to hospitalization   Type of Residence Private residence   Number of Stairs to Enter Residence 4   Number of Stairs Within Residence 0   Do you have animals or pets at home? No   Who is requesting discharge planning? Provider   Expected Discharge Disposition Home  (DC dispo is pending workup. Likely home no needs)   Does the patient need discharge transport arranged? No   Financial Resource Strain   How hard is it for you to pay for the very basics like food, housing, medical care, and heating? Not hard   Housing Stability   In the last 12 months, was there a time when you were not able to pay the mortgage or rent on time? N   In the past 12 months, how many times have you moved where you were living? 0   At any time in the past 12 months, were you homeless or living in a shelter (including now)? N   Transportation Needs   In the past 12 months, has lack of transportation kept you from medical appointments or from getting medications? no   In the past 12 months, has lack of transportation kept you from meetings, work, or from getting things needed for daily living? No   Intensity of Service   Intensity of Service 0-30 min     03/26/2025 1608pm  Spoke with patient and patient's spouse bedside in ED

## 2025-03-26 NOTE — ED PROVIDER NOTES
HPI   Chief Complaint   Patient presents with    Shortness of Breath     Pt BIBS from home with c/o chest pressure and shortness of breath. Symptoms began yesterday afternoon, was intermittent through out the night. This morning pt was on his way to a doctors appointment and had an increase in shortness of breath. Squad states pt was satting at 90% on room air, was placed on 4L NC. Hx of CHF       Is an 89-year-old male with a history of pacemaker coming in for chest pain.  He reports his symptoms started last night.  He was post see cardiology today however his pain intensified and he was concerned so came in to be evaluated.  Patient states that the pain is located left-sided chest.  He has seen cardiology before in the past at [facility] but would like to transition to a new cardiologist here.  Patient does take Eliquis.      History provided by:  Patient and EMS personnel          Patient History   No past medical history on file.  No past surgical history on file.  No family history on file.  Social History     Tobacco Use    Smoking status: Never     Passive exposure: Never    Smokeless tobacco: Never   Substance Use Topics    Alcohol use: Not on file    Drug use: Not on file       Physical Exam   ED Triage Vitals [03/26/25 1230]   Temperature Heart Rate Respirations BP   36.8 °C (98.2 °F) 80 19 124/75      Pulse Ox Temp Source Heart Rate Source Patient Position   94 % Temporal Monitor Lying      BP Location FiO2 (%)     Right arm --       Physical Exam  Vitals and nursing note reviewed.   Constitutional:       General: He is not in acute distress.     Appearance: Normal appearance. He is not toxic-appearing.   HENT:      Head: Normocephalic and atraumatic.      Nose: Nose normal.      Mouth/Throat:      Mouth: Mucous membranes are moist.      Pharynx: Oropharynx is clear.   Eyes:      Extraocular Movements: Extraocular movements intact.      Conjunctiva/sclera: Conjunctivae normal.      Pupils: Pupils are  equal, round, and reactive to light.   Cardiovascular:      Rate and Rhythm: Normal rate and regular rhythm.      Pulses: Normal pulses.      Heart sounds: Normal heart sounds.   Pulmonary:      Effort: Pulmonary effort is normal. No respiratory distress.      Breath sounds: Normal breath sounds.   Abdominal:      General: Abdomen is flat. Bowel sounds are normal.      Palpations: Abdomen is soft.      Tenderness: There is no abdominal tenderness.   Musculoskeletal:         General: Normal range of motion.      Cervical back: Normal range of motion and neck supple.      Right lower leg: Edema present.      Left lower leg: Edema present.   Skin:     General: Skin is warm and dry.      Coloration: Skin is not jaundiced or pale.      Findings: No bruising.   Neurological:      General: No focal deficit present.      Mental Status: He is alert and oriented to person, place, and time. Mental status is at baseline.   Psychiatric:         Mood and Affect: Mood normal.         Behavior: Behavior normal.           ED Course & MDM   ED Course as of 03/26/25 1555   Wed Mar 26, 2025   1304 EKG completed at 1230 shows on my interpretation ventricular paced rhythm at a rate of 72 bpm.  No signs of STEMI.  PVC present. [RS]   1408 EKG obtained at 1406 read by me reviewed by me is ventricular paced rhythm at 70 bpm.  Left axis deviation.  No significant ST segment elevations or depressions.  Previous noted depressions in anterior septal leads are resolved.  EKG unchanged from prior EKG in January. [HD]   1515 89-year-old with a history of atrial fibrillation on Eliquis and a pacemaker presents the emergency department with chest pressure shortness of breath and tunnel vision while eating dinner last night.  Concern for arrhythmia versus ACS.  He does have bilateral 2+ pitting edema.  Given a dose of Lasix in the emergency department.  Patient was post to see his outpatient cardiologist but given the events that occurred last night  he became concerned.  He states it was happening while at dinner.  He does have a mildly elevated bilirubin however he has a negative Akins sign and adamantly denying abdominal pain.  Patient amendable to admission given his moderate risk heart score for further workup and evaluation.  Will plan to interrogate his Medtronic device to see if he had an unstable arrhythmia that occurred last night.  [facility] I will be placed on consult as patient was planned to be establishing care with them today. [HD]   1524 Medtronic review do not see any acute unstable arrhythmias that occurred within 24 hours. [HD]      ED Course User Index  [HD] Irlanda Moura DO  [RS] Tyrel Conrad PA-C         Diagnoses as of 03/26/25 1555   Chest pain, unspecified type                 No data recorded                                 Medical Decision Making  Summary:  Medical Decision Making:   Patient presented as described in HPI. Patient case including ROS, PE, and treatment and plan discussed with ED attending if attached as cosigner. Results from labs and or imaging included below if completed. Valentin Rojas  is a 89 y.o. coming in for Patient presents with:  Shortness of Breath: Pt BIBS from home with c/o chest pressure and shortness of breath. Symptoms began yesterday afternoon, was intermittent through out the night. This morning pt was on his way to a doctors appointment and had an increase in shortness of breath. Squad states pt was satting at 90% on room air, was placed on 4L NC. Hx of CHF  . Pt has a HEART score of 5.  Due to patient's complaint cardiac workup was completed.  Patient has a stable troponin.  BNP came back greater than 1000.  He was given 20 mg of IV Lasix.  He has stable chronic kidney disease.  Chest x-ray shows abnormalities and to further evaluate the abnormalities a CT was completed and shows no acute concerning abnormalities.  Due to the concerns of a potential cardiac event that caused patient's discomfort  patient will be hospitalized for further care management and establishment with a cardiologist here as he prefers to see cardiology here or seeing them at [facility].  Patient is not requiring oxygen while here.    Shared decision making was completed for required hospital stay. I also explained the plan and treatment course. Patient/guardian is in agreement with plan, treatment course, and state that they will comply.    Labs Reviewed  CBC WITH AUTO DIFFERENTIAL - Abnormal     WBC                           5.6                    nRBC                          0.0                    RBC                           5.68                   Hemoglobin                    15.8                   Hematocrit                    48.9                   MCV                           86                     MCH                           27.8                   MCHC                          32.3                   RDW                           19.9 (*)               Platelets                     164                    Neutrophils %                 71.3                   Immature Granulocytes %, Automated   0.2                    Lymphocytes %                 20.8                   Monocytes %                   6.8                    Eosinophils %                 0.4                    Basophils %                   0.5                    Neutrophils Absolute          3.99                   Immature Granulocytes Absolute, Au*   0.01                   Lymphocytes Absolute          1.16                   Monocytes Absolute            0.38                   Eosinophils Absolute          0.02                   Basophils Absolute            0.03                COMPREHENSIVE METABOLIC PANEL - Abnormal     Glucose                       183 (*)                Sodium                        138                    Potassium                     3.9                    Chloride                      98                     Bicarbonate                    31                     Anion Gap                     13                     Urea Nitrogen                 31 (*)                 Creatinine                    1.93 (*)               eGFR                          33 (*)                 Calcium                       9.2                    Albumin                       4.1                    Alkaline Phosphatase          145 (*)                Total Protein                 7.3                    AST                           25                     Bilirubin, Total              1.7 (*)                ALT                           16                  BLOOD GAS VENOUS FULL PANEL - Abnormal     POCT pH, Venous               7.44 (*)               POCT pCO2, Venous             49                     POCT pO2, Venous              37                     POCT SO2, Venous              65                     POCT Oxy Hemoglobin, Venous   63.9                   POCT Hematocrit Calculated, Venous   44.0                   POCT Sodium, Venous           137                    POCT Potassium, Venous        3.9                    POCT Chloride, Venous         101                    POCT Ionized Calicum, Venous   1.18                   POCT Glucose, Venous          214 (*)                POCT Lactate, Venous          1.0                    POCT Base Excess, Venous      7.7 (*)                POCT HCO3 Calculated, Venous   33.3 (*)               POCT Hemoglobin, Venous       14.5                   POCT Anion Gap, Venous        7.0 (*)                Patient Temperature                                  FiO2                          2                   SERIAL TROPONIN-INITIAL - Abnormal     Troponin I, High Sensitivity   27 (*)                   Narrative: Less than 99th percentile of normal range cutoff-                Female and children under 18 years old <14 ng/L; Male <21 ng/L: Negative                Repeat testing should be performed if clinically indicated.                                  Female and children under 18 years old 14-50 ng/L; Male 21-50 ng/L:                Consistent with possible cardiac damage and possible increased clinical                 risk. Serial measurements may help to assess extent of myocardial damage.                                 >50 ng/L: Consistent with cardiac damage, increased clinical risk and                myocardial infarction. Serial measurements may help assess extent of                 myocardial damage.                                  NOTE: Children less than 1 year old may have higher baseline troponin                 levels and results should be interpreted in conjunction with the overall                 clinical context.                                 NOTE: Troponin I testing is performed using a different                 testing methodology at [facility] than at other                 [facility]. Direct result comparisons should only                 be made within the same method.  SERIAL TROPONIN, 1 HOUR - Abnormal     Troponin I, High Sensitivity   23 (*)                   Narrative: Less than 99th percentile of normal range cutoff-                Female and children under 18 years old <14 ng/L; Male <21 ng/L: Negative                Repeat testing should be performed if clinically indicated.                                 Female and children under 18 years old 14-50 ng/L; Male 21-50 ng/L:                Consistent with possible cardiac damage and possible increased clinical                 risk. Serial measurements may help to assess extent of myocardial damage.                                 >50 ng/L: Consistent with cardiac damage, increased clinical risk and                myocardial infarction. Serial measurements may help assess extent of                 myocardial damage.                                  NOTE: Children less than 1 year old may have higher baseline troponin                 levels and  results should be interpreted in conjunction with the overall                 clinical context.                                 NOTE: Troponin I testing is performed using a different                 testing methodology at [facility] than at other                 [facility]. Direct result comparisons should only                 be made within the same method.  B-TYPE NATRIURETIC PEPTIDE - Abnormal     BNP                           1,154 (*)                 Narrative:    <100 pg/mL - Heart failure unlikely                100-299 pg/mL - Intermediate probability of acute heart                                failure exacerbation. Correlate with clinical                                context and patient history.                  >=300 pg/mL - Heart Failure likely. Correlate with clinical                                context and patient history.                                BNP testing is performed using different testing methodology at [facility] than at other [facility]. Direct result comparisons should only be made within the same method.                   MAGNESIUM - Normal     Magnesium                     2.29                SARS-COV-2, INFLUENZA A/B AND RSV PCR - Normal     Coronavirus 2019, PCR                                Flu A Result                                         Flu B Result                                         RSV PCR                                                Narrative: This assay is an FDA-cleared, in vitro diagnostic nucleic acid amplification test for the qualitative detection and differentiation of SARS CoV-2/ Influenza A/B/ RSV from nasopharyngeal specimens collected from individuals with signs and symptoms of respiratory tract infections, and has been validated for use at [facility]. Negative results do not preclude COVID-19/ Influenza A/B/ RSV infections and should not be used as the sole basis for diagnosis,  treatment, or other management decisions. Testing for SARS CoV-2 is recommended only for patients who meet current clinical and/or epidemiological criteria defined by federal, state, or local public health directives.  TROPONIN SERIES- (INITIAL, 1 HR)   CT chest wo IV contrast   Final Result    1. Mild cardiomegaly.    2. Moderate emphysematous changes.    3. No acute consolidation.    4. Bronchiectasis in the bilateral lower lobes. Mild dependent    changes most likely atelectasis.                Signed by: Steve Waite 3/26/2025 2:26 PM    Dictation workstation:   [id number]     XR chest 1 view   Final Result    1. Similar prominence of the upper mediastinum, which could relate to    prominent vasculature or underlying mediastinal lesion. Further    evaluation with CT chest is recommended.    2. Similar mild interstitial edema and trace left pleural effusion    with atelectasis. Superimposed infection to be considered in the    appropriate clinical context.                Signed by: Radha Edgar 3/26/2025 1:15 PM    Dictation workstation:   [id number]                            Tests/Medications/Escalations of Care considered but not given: As in [facility]    Patient care discussed with: N/A  Social Determinants affecting care: N/A    Final diagnosis and disposition as documented     ED Course as of 03/26/25 1556  ------------------------------------------------------------  Time: 03/26 1304  Comment: EKG completed at 1230 shows on my interpretation ventricular paced rhythm at a rate of 72 bpm.  No signs of STEMI.  PVC present.  By: Tyrel Conrad PA-C  ------------------------------------------------------------  Time: 03/26 1408  Comment: EKG obtained at 1406 read by me reviewed by me is ventricular paced rhythm at 70 bpm.  Left axis deviation.  No significant ST segment elevations or depressions.  Previous noted depressions in anterior septal leads are resolved.  EKG unchanged from prior EKG in  January.  By: Irlanda Moura, DO  ------------------------------------------------------------  Time: 03/26 1515  Comment: 89-year-old with a history of atrial fibrillation on Eliquis and a pacemaker presents the emergency department with chest pressure shortness of breath and tunnel vision while eating dinner last night.  Concern for arrhythmia versus ACS.  He does have bilateral 2+ pitting edema.  Given a dose of Lasix in the emergency department.  Patient was post to see his outpatient cardiologist but given the events that occurred last night he became concerned.  He states it was happening while at dinner.  He does have a mildly elevated bilirubin however he has a negative Akins sign and adamantly denying abdominal pain.  Patient amendable to admission given his moderate risk heart score for further workup and evaluation.  Will plan to interrogate his Medtronic device to see if he had an unstable arrhythmia that occurred last night.  [facility] I will be placed on consult as patient was planned to be establishing care with them today.  By: Irlanda Moura DO  ------------------------------------------------------------  Time: 03/26 1524  Comment: Medtronic review do not see any acute unstable arrhythmias that occurred within 24 hours.  By: Irlanda oMura, DO    ------------------------------------------------------------  Diagnoses as of 03/26/25 1556  Chest pain, unspecified type       Shared decision making was completed and determined that patient will be hospitalized. I discussed the differential; results and admit plan with the patient and/or family/friend/caregiver if present.  I emphasized the importance of hospitalization need for re-evaluation/continued monitoring/interventions. They agreed that if they feel their condition is worsening or if they have any other concern they should alert staff immediately for further assistance. I gave the patient an opportunity to ask all questions they had and answered all  of them accordingly. The patient and/or family/friend/caregiver expressed understanding verbally and that they would comply.    Disposition:  Hospitalize to tele [facility] floor under Dr. Ramirez per their orders. Discussed findings and treatment done here in ED with admitting physician. It would be a risk to discharge the patient in their condition due to possibility of deterioration in their condition and the need for urgent interventions.    This note has been transcribed using voice recognition and may contain grammatical errors, misplaced words, incorrect words, incorrect phrases or other errors.         Procedure  Procedures     Tyrel Conrad PA-C  03/26/25 [id number]

## 2025-03-26 NOTE — H&P
History Of Present Illness  Valentin Rojas is a 89 y.o. male MHx of HFrEF/NICM (EF 20-25% 1/2025) s/p Medtronic ICD placement, CHB s/p CRT, CKD3, T2DM, HTN, HLD, GERD, BPH, hypothyroidism, V. Tach s/p ablation, A-fib (on Eliquis) presenting with chest pressure and shortness of breath that started yesterday afternoon and worsened today when he was getting ready to go cardiology appointment. Symptoms are intermittent, occur at rest and with exertion. Chest pressure improves when he uses his OTC inhaler for nasal congestion. Denies cough, fever, chills or any history of lung disease. He does endorse 30 pack year history of smoking but he quit decades ago. He is taking his Lasix regularly and denies weight gain. He denies chest pain. Feels like his BLE edema is close to baseline. He is hungry.      In the ED, /75 HR 80 afebrile Spo2 94% on 2 L oxygen. Labs: creatinine 1.93 Alk phos 145 Trop 27>23, bilirubin 1.7. EKG: ventricular paced rhythm, rate 72 bpm, no ischemia. Medtronic reviewed by ED provider and did not see any acute unstable arrhythmias that occurred within 24 hours. Respiratory viral swabs negative. VBG: pH 7.44 pCO2 49 pO2 37. CXR: mild interstitial prominence and trace left pleural effusion with atelectasis. CT chest: mild CM, moderate emphysematous changes, no acute consolidation, bronchiectasis in bilateral lower lobes, atelectasis.  He was given Lasix IV 20 mg x 1 and recommended admission.      Past Medical History  No past medical history on file.    Surgical History  No past surgical history on file.     Social History  He reports that he has never smoked. He has never been exposed to tobacco smoke. He has never used smokeless tobacco. No history on file for alcohol use and drug use.    Family History  No family history on file.     Allergies  Penicillins    Review of Systems   Constitutional:  Negative for diaphoresis, fever and unexpected weight change.   HENT:  Negative for rhinorrhea,  sore throat and trouble swallowing.    Eyes:  Negative for photophobia, redness and visual disturbance.   Respiratory:  Positive for chest tightness and shortness of breath. Negative for cough and wheezing.    Cardiovascular:  Positive for leg swelling. Negative for chest pain and palpitations.        Chest pressure   Gastrointestinal:  Negative for abdominal pain, constipation, diarrhea, nausea and vomiting.   Endocrine: Negative for polyuria.   Genitourinary:  Negative for difficulty urinating, dysuria, flank pain, frequency, hematuria and urgency.   Musculoskeletal:  Positive for arthralgias. Negative for gait problem and myalgias.   Skin:  Negative for rash and wound.   Allergic/Immunologic: Negative for immunocompromised state.   Neurological:  Negative for dizziness, seizures, syncope, facial asymmetry, weakness, light-headedness, numbness and headaches.   Psychiatric/Behavioral:  Negative for confusion, decreased concentration, hallucinations and sleep disturbance.         Physical Exam  Physical Exam  Gen: NAD  Eyes:  EOM intact  ENT: MMM  Neck: No JVD  Respiratory: Diminished but no obvious wheezing  Cardiac: RRR, no murmurs rubs or gallops  Abdomen: soft, NT, +BS  Extremities: 2+ bilateral LE edema  Neuro: No focal deficits, alert and oriented x 3  Psych:  appropriate mood and behavior     Last Recorded Vitals  Blood pressure 132/86, pulse 70, temperature 36.8 °C (98.2 °F), temperature source Temporal, resp. rate 20, height 1.829 m (6'), weight 79.4 kg (175 lb), SpO2 96%.       Assessment/Plan   Chest pressure/SOB  -in the setting of HFrEF/NICM (EF 20-25% 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 Tach s/p ablation with mild CHF exacerbation and possible undiagnosed COPD  -OBS tele  -CXR: mild interstitial prominence/trace left pleural effusion with atelectasis  -CT chest: mild CM, moderate emphysematous changes, no acute consolidation, bronchiectasis in bilateral lower lobes,  atelectasis  -currently on room air but arrived to ED on 4 L  -requested walking pulse ox on room air  -IS  -await home meds  -cardiology consult  -he would benefit from outpt PFTs with pulm consult  -albuterol PRN    Elevated troponin and BNP  -denies CP  -trop/BNP elevations improved from Jan  -cardiology to see    Elevated bilirubin  -denies abdominal pain    CKD3  -baseline  -monitor  -avoid nephrotoxins    T2DM  -A1c 7.7  -accuchecks with ISS  -ADA diet    HTN/HLD  -await home meds    GERD  -PPI    Hypothyroidism  -check TSH in light of amiodarone  -continue home synthroid once dose is known    DVT prophy  -likely eliquis once staff enters home meds    I spent 40 minutes in the professional and overall care of this patient.    Dispo: OBS tele, eLOS<48 hours or 2 MNs    SHARRI RobertsC  D/w Dr. Ramirez

## 2025-03-26 NOTE — Clinical Note
CPE   Future Appointments   Date Time Provider Krystal Kathya   6/1/2022  8:40 AM Mary Guajardo, APRN [facility]      Orders to   Nathan Barajas   aware must fast no call back required Fast patches applied.

## 2025-03-26 NOTE — PROGRESS NOTES
Pharmacy Medication History Review    Valentin Rojas is a 89 y.o. male admitted for No Principal Problem: There is no principal problem currently on the Problem List. Please update the Problem List and refresh.. Pharmacy reviewed the patient's [id number] medications and allergies for accuracy.    The list below reflectives the updated PTA list. Please review each medication in order reconciliation for additional clarification and justification.  Prior to Admission medications    Medication Sig Start Date End Date Taking? Authorizing Provider   amiodarone (Pacerone) 400 mg tablet Take 1 tablet (400 mg) once daily. 1/15/25 3/26/25 Yes Miguel Garcia DO   apixaban (Eliquis) 2.5 mg tablet Take 1 tablet (2.5 mg) by mouth 2 times a day. 1/15/25 3/26/25 Yes Miguel Garcia DO   carvedilol (Coreg) 25 mg tablet Take 1 tablet (25 mg) by mouth 2 times a day. Pt reports rx is prescribed for 3 times daily but he only takes it twice daily   Yes Historical Provider, MD   empagliflozin (Jardiance) 25 mg Take 1 tablet (25 mg) by mouth once daily.   Yes Historical Provider, MD   glipiZIDE XL (Glucotrol XL) 5 mg 24 hr tablet Take 1 tablet (5 mg) by mouth once daily. Do not crush, chew, or split.   Yes Historical Provider, MD   lansoprazole (Prevacid) 15 mg DR capsule Take 1 capsule (15 mg) by mouth once daily as needed. Do not crush or chew.  Pt currently out of medication   Yes, but has been out of x 1 week Historical Provider, MD   levothyroxine (Synthroid, Levoxyl) 75 mcg tablet Take 1 tablet (75 mcg) by mouth once daily. 10/21/24  Yes Historical Provider, MD   lisinopril 10 mg tablet Take 1 tablet (10 mg) by mouth once daily.   Yes Historical Provider, MD   spironolactone (Aldactone) 25 mg tablet Take 1 tablet (25 mg) by mouth once every 24 hours. 1/16/25 3/26/25 Yes Miguel Garcia DO   tamsulosin (Flomax) 0.4 mg 24 hr capsule Take 1 capsule (0.4 mg) by mouth once daily at bedtime. 4/29/24  Yes Historical  Provider, MD   furosemide (Lasix) 20 mg tablet Take 2 tablets (40 mg) by mouth once daily in the morning. Pt takes 40 mg in the morning and then additional 20 mg mid morning for a daily total of 60 mg.   Yes Historical Provider, MD   furosemide (Lasix) 20 mg tablet Take 1 tablet (20 mg) by mouth once daily. Pt takes 40 mg in the morning and then additional 20 mg mid morning for a daily total of 60 mg.   Yes Historical Provider, MD   insulin glargine (Lantus U-100 Insulin) 100 unit/mL injection Inject 22 Units under the skin once daily at bedtime. Take as directed per insulin instructions.   Pt states that he was originally prescribed 22 units at bedtime, but adjust based on his blood glucose level anywhere from 15 units to 22 units subcutaneously at bedtime.   Yes Historical Provider, MD   oxyCODONE-acetaminophen (Percocet) 5-325 mg tablet Take 1 tablet by mouth every 8 hours if needed for severe pain (7 - 10). Pt states he takes 1 to 2 tablets by mouth daily as needed for pain  Pt takes 1-2 tablets daily as needed Yes Historical Provider, MD   POTASSIUM CHLORIDE ORAL Take 99 mg by mouth once daily.   Yes Historical Provider, MD   rosuvastatin (Crestor) 10 mg tablet Take 1 tablet (10 mg) by mouth once daily.  Patient not taking: Reported on 3/26/2025  Pt not taking No Historical Provider, MD                        The list below reflectives the updated allergy list. Please review each documented allergy for additional clarification and justification.  Allergies  Reviewed by Nichole Vaughn RN on 3/26/2025        Severity Reactions Comments    Penicillins High Rash, Shortness of breath, Swelling, Unknown             Below are additional concerns with the patient's PTA list.  Pt taking multiple prescriptions differently than prescribed.    Aretha Olivo, [facility]

## 2025-03-27 PROBLEM — R07.9 CHEST PAIN, UNSPECIFIED TYPE: Status: ACTIVE | Noted: 2025-03-27

## 2025-03-27 LAB
ANION GAP SERPL CALC-SCNC: 12 MMOL/L (ref 10–20)
BUN SERPL-MCNC: 30 MG/DL (ref 6–23)
CALCIUM SERPL-MCNC: 8.3 MG/DL (ref 8.6–10.3)
CHLORIDE SERPL-SCNC: 100 MMOL/L (ref 98–107)
CO2 SERPL-SCNC: 33 MMOL/L (ref 21–32)
CREAT SERPL-MCNC: 1.73 MG/DL (ref 0.5–1.3)
EGFRCR SERPLBLD CKD-EPI 2021: 38 ML/MIN/1.73M*2
ERYTHROCYTE [DISTWIDTH] IN BLOOD BY AUTOMATED COUNT: 19.9 % (ref 11.5–14.5)
GLUCOSE BLD MANUAL STRIP-MCNC: 156 MG/DL (ref 74–99)
GLUCOSE BLD MANUAL STRIP-MCNC: 159 MG/DL (ref 74–99)
GLUCOSE BLD MANUAL STRIP-MCNC: 221 MG/DL (ref 74–99)
GLUCOSE BLD MANUAL STRIP-MCNC: 68 MG/DL (ref 74–99)
GLUCOSE SERPL-MCNC: 76 MG/DL (ref 74–99)
HCT VFR BLD AUTO: 44.5 % (ref 41–52)
HGB BLD-MCNC: 14.1 G/DL (ref 13.5–17.5)
MAGNESIUM SERPL-MCNC: 2.35 MG/DL (ref 1.6–2.4)
MCH RBC QN AUTO: 28 PG (ref 26–34)
MCHC RBC AUTO-ENTMCNC: 31.7 G/DL (ref 32–36)
MCV RBC AUTO: 88 FL (ref 80–100)
NRBC BLD-RTO: 0 /100 WBCS (ref 0–0)
PLATELET # BLD AUTO: 142 X10*3/UL (ref 150–450)
POTASSIUM SERPL-SCNC: 3.4 MMOL/L (ref 3.5–5.3)
RBC # BLD AUTO: 5.04 X10*6/UL (ref 4.5–5.9)
SODIUM SERPL-SCNC: 142 MMOL/L (ref 136–145)
WBC # BLD AUTO: 4.7 X10*3/UL (ref 4.4–11.3)

## 2025-03-27 PROCEDURE — 1200000002 HC GENERAL ROOM WITH TELEMETRY DAILY

## 2025-03-27 PROCEDURE — 2500000004 HC RX 250 GENERAL PHARMACY W/ HCPCS (ALT 636 FOR OP/ED): Performed by: NURSE PRACTITIONER

## 2025-03-27 PROCEDURE — 85027 COMPLETE CBC AUTOMATED: CPT | Performed by: PHYSICIAN ASSISTANT

## 2025-03-27 PROCEDURE — 94760 N-INVAS EAR/PLS OXIMETRY 1: CPT

## 2025-03-27 PROCEDURE — 36415 COLL VENOUS BLD VENIPUNCTURE: CPT | Performed by: PHYSICIAN ASSISTANT

## 2025-03-27 PROCEDURE — 2500000002 HC RX 250 W HCPCS SELF ADMINISTERED DRUGS (ALT 637 FOR MEDICARE OP, ALT 636 FOR OP/ED): Performed by: PHYSICIAN ASSISTANT

## 2025-03-27 PROCEDURE — 99222 1ST HOSP IP/OBS MODERATE 55: CPT | Performed by: STUDENT IN AN ORGANIZED HEALTH CARE EDUCATION/TRAINING PROGRAM

## 2025-03-27 PROCEDURE — 99233 SBSQ HOSP IP/OBS HIGH 50: CPT | Performed by: PHYSICIAN ASSISTANT

## 2025-03-27 PROCEDURE — 2500000001 HC RX 250 WO HCPCS SELF ADMINISTERED DRUGS (ALT 637 FOR MEDICARE OP): Performed by: PHYSICIAN ASSISTANT

## 2025-03-27 PROCEDURE — 83735 ASSAY OF MAGNESIUM: CPT | Performed by: PHYSICIAN ASSISTANT

## 2025-03-27 PROCEDURE — 82947 ASSAY GLUCOSE BLOOD QUANT: CPT

## 2025-03-27 PROCEDURE — 80048 BASIC METABOLIC PNL TOTAL CA: CPT | Performed by: PHYSICIAN ASSISTANT

## 2025-03-27 RX ORDER — AMIODARONE HYDROCHLORIDE 200 MG/1
200 TABLET ORAL 2 TIMES DAILY
Status: DISCONTINUED | OUTPATIENT
Start: 2025-03-27 | End: 2025-03-29 | Stop reason: HOSPADM

## 2025-03-27 RX ORDER — LIDOCAINE HYDROCHLORIDE 10 MG/ML
INJECTION, SOLUTION EPIDURAL; INFILTRATION; INTRACAUDAL; PERINEURAL
Status: DISPENSED
Start: 2025-03-27 | End: 2025-03-28

## 2025-03-27 RX ORDER — ALBUTEROL SULFATE 0.83 MG/ML
2.5 SOLUTION RESPIRATORY (INHALATION) EVERY 2 HOUR PRN
Status: DISCONTINUED | OUTPATIENT
Start: 2025-03-27 | End: 2025-03-29 | Stop reason: HOSPADM

## 2025-03-27 RX ORDER — POTASSIUM CHLORIDE 20 MEQ/1
40 TABLET, EXTENDED RELEASE ORAL ONCE
Status: COMPLETED | OUTPATIENT
Start: 2025-03-27 | End: 2025-03-27

## 2025-03-27 RX ORDER — FUROSEMIDE 10 MG/ML
40 INJECTION INTRAMUSCULAR; INTRAVENOUS ONCE
Status: COMPLETED | OUTPATIENT
Start: 2025-03-27 | End: 2025-03-27

## 2025-03-27 RX ADMIN — FUROSEMIDE 40 MG: 10 INJECTION, SOLUTION INTRAVENOUS at 14:30

## 2025-03-27 RX ADMIN — APIXABAN 2.5 MG: 2.5 TABLET, FILM COATED ORAL at 08:04

## 2025-03-27 RX ADMIN — SPIRONOLACTONE 25 MG: 25 TABLET ORAL at 08:04

## 2025-03-27 RX ADMIN — OXYCODONE HYDROCHLORIDE AND ACETAMINOPHEN 1 TABLET: 5; 325 TABLET ORAL at 21:34

## 2025-03-27 RX ADMIN — FUROSEMIDE 40 MG: 40 TABLET ORAL at 05:58

## 2025-03-27 RX ADMIN — LEVOTHYROXINE SODIUM 75 MCG: 0.07 TABLET ORAL at 05:58

## 2025-03-27 RX ADMIN — APIXABAN 2.5 MG: 2.5 TABLET, FILM COATED ORAL at 20:30

## 2025-03-27 RX ADMIN — CARVEDILOL 25 MG: 25 TABLET, FILM COATED ORAL at 20:29

## 2025-03-27 RX ADMIN — INSULIN GLARGINE 15 UNITS: 100 INJECTION, SOLUTION SUBCUTANEOUS at 20:29

## 2025-03-27 RX ADMIN — ROSUVASTATIN CALCIUM 10 MG: 10 TABLET, FILM COATED ORAL at 20:29

## 2025-03-27 RX ADMIN — AMIODARONE HYDROCHLORIDE 200 MG: 200 TABLET ORAL at 10:21

## 2025-03-27 RX ADMIN — PANTOPRAZOLE SODIUM 40 MG: 40 TABLET, DELAYED RELEASE ORAL at 06:01

## 2025-03-27 RX ADMIN — AMIODARONE HYDROCHLORIDE 200 MG: 200 TABLET ORAL at 20:29

## 2025-03-27 RX ADMIN — LISINOPRIL 10 MG: 10 TABLET ORAL at 08:06

## 2025-03-27 RX ADMIN — TAMSULOSIN HYDROCHLORIDE 0.4 MG: 0.4 CAPSULE ORAL at 20:29

## 2025-03-27 RX ADMIN — CARVEDILOL 25 MG: 25 TABLET, FILM COATED ORAL at 08:04

## 2025-03-27 RX ADMIN — POTASSIUM CHLORIDE 40 MEQ: 1500 TABLET, EXTENDED RELEASE ORAL at 08:04

## 2025-03-27 RX ADMIN — FUROSEMIDE 20 MG: 20 TABLET ORAL at 08:05

## 2025-03-27 ASSESSMENT — PAIN - FUNCTIONAL ASSESSMENT
PAIN_FUNCTIONAL_ASSESSMENT: 0-10

## 2025-03-27 ASSESSMENT — PAIN DESCRIPTION - LOCATION: LOCATION: KNEE

## 2025-03-27 ASSESSMENT — COGNITIVE AND FUNCTIONAL STATUS - GENERAL
TOILETING: A LITTLE
MOVING TO AND FROM BED TO CHAIR: A LITTLE
DRESSING REGULAR LOWER BODY CLOTHING: A LITTLE
CLIMB 3 TO 5 STEPS WITH RAILING: A LOT
WALKING IN HOSPITAL ROOM: A LITTLE
TURNING FROM BACK TO SIDE WHILE IN FLAT BAD: A LITTLE
DRESSING REGULAR UPPER BODY CLOTHING: A LITTLE
STANDING UP FROM CHAIR USING ARMS: A LITTLE
DAILY ACTIVITIY SCORE: 20
MOVING FROM LYING ON BACK TO SITTING ON SIDE OF FLAT BED WITH BEDRAILS: A LITTLE
MOBILITY SCORE: 17
HELP NEEDED FOR BATHING: A LITTLE

## 2025-03-27 ASSESSMENT — PAIN DESCRIPTION - ORIENTATION: ORIENTATION: LEFT

## 2025-03-27 ASSESSMENT — PAIN SCALES - GENERAL
PAINLEVEL_OUTOF10: 0 - NO PAIN
PAINLEVEL_OUTOF10: 7
PAINLEVEL_OUTOF10: 0 - NO PAIN
PAINLEVEL_OUTOF10: 3

## 2025-03-27 ASSESSMENT — ENCOUNTER SYMPTOMS
SHORTNESS OF BREATH: 1
PALPITATIONS: 1

## 2025-03-27 NOTE — SIGNIFICANT EVENT
Cardiology recommends IV diuresis with plans for cardioversion tomorrow. Recommend upgrade to IP. D/w Dr. Ramirez   LVMTRC

## 2025-03-27 NOTE — CONSULTS
Inpatient consult to Cardiology  Consult performed by: LAUREN Mon-CNP  Consult ordered by: Maki Menchaca PA-C        History Of Present Illness:    Valentin Rojas is a 89 y.o. male presenting with PMH of HFrEF/NICM (EF 20-25% 1/2025)) s/p CRT-D, CHB, CKD3, T2DM, HTN, HLD, GERD, BPH, hypothyroidism, V. 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 Symptoms are intermittent, occur at rest and with exertion. Chest pressure improves when he uses his OTC inhaler for nasal congestion. Denies orthopnea, weight changes, ICD shocks, cough, fever, chills or any history of lung disease. He follows with cardiology at [facility].  [facility] 9/2024 with nonobstructive CAD.     In the ED, /75 HR 80 afebrile Spo2 94% on 2 L oxygen. EKG: ventricular paced rhythm, rate 72 bpm, no ischemia. [employer] reviewed by ED provider and did not see any acute unstable arrhythmias that occurred within 24 hours. Labs significant for potassium 3.9, BUN 31, creatinine 1.93, BNP 1154, troponin 27,23. CT chest showed mild cardiomegaly, moderate emphysematous changes, no acute consolidation. CXR showed mild interstitial edema and trace left pleural effusion with atelectasis.     He was admitted at [facility] 9/2024 with ICD firing at which time Amiodarone was increased from 200 to 400 however patient opted against this at home. He was hospitalized 1/12-1/15/2025 presented to [facility] ED on 1/12/25 with nonsustained VT. TTE at that time showed LVEF 20-25%. He was loaded with amiodarone and advised to take 200 mg BID. He was placed on spironolactone 25 mg daily. Additional home medications include Eliquis, Coreg, Jardiance, Lasix, lisinopril, potassium, rosuvastatin.     Review of Systems   Respiratory:  Positive for shortness of breath.    Cardiovascular:  Positive for chest pain and palpitations.   All other systems reviewed and are negative.    Last Recorded  Vitals:  Vitals:    03/26/25 2348 03/27/25 0350 03/27/25 0715 03/27/25 0800   BP: 123/82 120/78 122/83    BP Location: Right arm Right arm Right arm    Patient Position:   Lying    Pulse: 72 73 71    Resp: 19 18 16    Temp: 36.6 °C (97.9 °F)  36.6 °C (97.9 °F) 36.5 °C (97.7 °F)   TempSrc: Temporal Temporal Temporal    SpO2: 92% 93% 96%    Weight:       Height:           Last Labs:  CBC - 3/27/2025:  6:02 AM  4.7 14.1 142    44.5      CMP - 3/27/2025:  6:02 AM  8.3 7.3 25 --- 1.7   3.3 4.1 16 145      PTT - No results in last year.  _   _ _     Troponin I, High Sensitivity   Date/Time Value Ref Range Status   03/26/2025 01:41 PM 23 (H) 0 - 20 ng/L Final   03/26/2025 12:38 PM 27 (H) 0 - 20 ng/L Final   01/12/2025 10:48 PM 59 (HH) 0 - 20 ng/L Final     Comment:     Previous result verified on 1/12/2025 2156 on specimen/case [id number] called with component [facility] for procedure Troponin I, High Sensitivity, Initial with value 66 ng/L.     BNP   Date/Time Value Ref Range Status   03/26/2025 12:38 PM 1,154 (H) 0 - 99 pg/mL Final   01/12/2025 09:22 PM 1,216 (H) 0 - 99 pg/mL Final     Hemoglobin A1C   Date/Time Value Ref Range Status   08/29/2024 06:58 PM 7.7 (H) 4.7 - 6.4 % Final     Comment:     Interpretation:     Standardized A1c  Good control or normal:  4-6% ( mg/dL avg)  Moderate control:        6.1-8.0% (120-180 mg/dL avg)  Poor control:            >8.0% (180 mg/dL avg)  With 4% as a baseline, each 1% increase = 30 mg/dL increase in average   glucose.  Taken from DCCT (Diabetes Control Complications Trial)   08/20/2024 09:31 AM 8.1 (H) 4.7 - 6.4 % Final     Comment:     Interpretation:     Standardized A1c  Good control or normal:  4-6% ( mg/dL avg)  Moderate control:        6.1-8.0% (120-180 mg/dL avg)  Poor control:            >8.0% (180 mg/dL avg)  With 4% as a baseline, each 1% increase = 30 mg/dL increase in average   glucose.  Taken from DCCT (Diabetes Control Complications Trial)      Last  I/O:  I/O last 3 completed shifts:  In: 420 (5.3 mL/kg) [P.O.:420]  Out: 1430 (18 mL/kg) [Urine:1430 (0.5 mL/kg/hr)]  Weight: 79.4 kg     Past Cardiology Tests (Last 3 Years):  EKG:  ECG 12 lead   V paced    Echo:  Transthoracic Echo (TTE) Complete 01/13/2025  CONCLUSIONS:   1. The left ventricular systolic function is severely decreased, with a visually estimated ejection fraction of 20-25%.   2. There is global hypokinesis of the left ventricle with minor regional variations.   3. Left ventricular cavity size is moderately dilated.   4. There is reduced right ventricular systolic function.   5. Severely enlarged right ventricle.   6. The left atrium is severely dilated.   7. The right atrium is moderately dilated.   8. Mild to moderate mitral valve regurgitation.   9. Mild to moderate tricuspid regurgitation visualized.  10. Right ventricular systolic pressure is within normal limits.  11. There is moderate pulmonic valve regurgitation.    9/3/2024 [facility]  CONCLUSIONS:   - Exam indication: Evaluation of known heart failure to guide therapy   - The left ventricle is normal in size. There is mild posterior left ventricular   hypertrophy. Left ventricular systolic function is moderately decreased. EF = 36 ±    5% (2D biplane) Left ventricular diastolic function was not evaluated due to   pacing.   - The right ventricle is dilated. Right ventricular systolic function is normal.   - The left atrial cavity is severely dilated.   - The right atrial cavity is mildly dilated.   - The visualized aorta is borderline dilated with a maximal dimension of 3.9 cm.   - There is moderate (2+) tricuspid valve regurgitation.   - Exam was compared with the prior  echocardiographic exam performed on   2/14/2020, LV systolic function has improved ( it was 20%)     Ejection Fractions:  EF   Date/Time Value Ref Range Status   01/13/2025 09:30 AM 23 %      Cath:  No results found for this or any previous visit from the past 1097  days.SERVICE DATE: 9/3/2024   SERVICE TIME:  1:07 PM       CARDIOLOGIST: Amber Hurley MD   ATTENDING: Lorri Smith MD   PRIMARY CARE PHYSICIAN: Puja Barrera DO   REFERRING PROVIDER: Puja Barrera DO     [place] STUDY OF HEALTH and AGING SCALE:6=Moderately Frail     CARDIOVASCULAR INSTABILITY:No     PRE-PROCEDURE DIAGNOSIS:   Abnormal Stress Test     POST PROCEDURE DIAGNOSIS:   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 Moderate sedation consisting of continuous ECG,   pulse oximetry and cardiopulmonary monitoring was performed by   the Cardiology Nurse, overseen by supervising physician, for an   intra-service time of 0 hr. 20 min.     Sedative Medications:   Drug: Versed   Dose: 1 mg   Route: IV     Drug: Fentanyl   Dose: 25 mcg   Route: IV   PRIORITY AT TIME OF PROCEDURE: Elective     SITE OF ENTRY: Radial:Right Radial     CONTRAST: Omnipaque 350 mg Iodine/mL (iohexol injection,   solution): 25 mL     ADDITIONAL PROCEDURES     CORONARY ANGIOGRAPHY:   The patient was taken to the cardiac cath lab where the entry   site was prepped and draped in a sterile manner. Under local   anesthesic with 2% Lidocaine, the vessel was cannulated with   Seldinger's technique using an arterial needle and a 6F sheath   was introduced.     Selective injections were made in the left and right coronary   arteries and various right, left and oblique views were obtained.       ADDITIONAL PROCEDURES     LEFT MAIN TRUNK: No Stenosis     LEFT ANTERIOR DESCENDIN% Stenosis     Location: Proximal      DIAGONAL #1: No Stenosis   DIAGONAL #2: No Stenosis     LEFT CIRCUMFLEX: 20% Stenosis    Location: Proximal     MARGINAL #1: No Stenosis     MARGINAL #2: No Stenosis     MARGINAL #3: No Stenosis     DOMINANT: YES   POSTERIOR DESCENDIN%  Stenosis    Location: Proximal      RIGHT CORONARY: No Stenosis     DOMINANT: NO     Hemodynamics:   LVEDP: 8 mm Hg.   LV-Ao gradient : 0 mm Hg.   LVEF: Not done. LEVEF available by nuclear image. + CKD.       The sheath was removed and hemostatsis was established using   manual pressure using wrist band. There was no bleeding at the   end of the procedure. The pt was returned to the recovery room in   a stable condition     ESTIMATED BLOOD LOSS: Less Than Minimal Unless Noted Here.     COMPLICATIONS: None     SPECIMENS: No specimens obtained unless noted here.     CONDITION: Stable     RECOMMENDATIONS: Follow protocol of post-op orders. Aggressive   modification of risk factors. [facility] [facility].   SIGNATURE: Amber Hurley MD PATIENT NAME: Valentin Rojas     Stress Test:  Nuclear Stress Test 08/30/2024  PATIENT:   Name: MR. VALENTIN ROJAS   MRN: [id number]   Age: 89 years   Gender: M     CONCLUSIONS:    1. SPECT Perfusion Study: Abnormal.    2. There is mild (<10%) ischemia in the territory of the LCX.    3. There is a small (<10%) fixed perfusion defect in the RCA territory.    4. Left ventricle is severely dilated. The left ventricle systolic   function is moderately decreased.    5. This is an intermediate risk scan.     Cardiac Imaging:  No results found for this or any previous visit from the past 1095 days.              Past Medical History:  He has no past medical history on file.    Past Surgical History:  He has no past surgical history on file.      Social History:  He reports that he has never smoked. He has never been exposed to tobacco smoke. He has never used smokeless tobacco. No history on file for alcohol use and drug use.    Family History:  No family history on file.     Allergies:  Penicillins    Inpatient Medications:  Scheduled medications   Medication Dose Route Frequency    amiodarone  200 mg oral Nightly    apixaban  2.5 mg oral BID    carvedilol  25 mg oral BID     furosemide  20 mg oral Daily    furosemide  40 mg oral q AM    insulin glargine  15 Units subcutaneous Nightly    insulin lispro  0-10 Units subcutaneous TID AC    levothyroxine  75 mcg oral Daily    lisinopril  10 mg oral Daily    pantoprazole  40 mg oral Daily before breakfast    Or    pantoprazole  40 mg intravenous Daily before breakfast    polyethylene glycol  17 g oral Daily    rosuvastatin  10 mg oral Nightly    spironolactone  25 mg oral q24h VENKATESH    tamsulosin  0.4 mg oral Nightly     PRN medications   Medication    acetaminophen    Or    acetaminophen    Or    acetaminophen    albuterol    dextrose    dextrose    glucagon    glucagon    melatonin    ondansetron    Or    ondansetron    oxyCODONE-acetaminophen     Continuous Medications   Medication Dose Last Rate    oxygen   2 L/min (03/26/25 1236)     Outpatient Medications:  Current Outpatient Medications   Medication Instructions    amiodarone (Pacerone) 400 mg tablet Take 1 tablet (400 mg) by mouth 2 times a day for 10 days, THEN 1 tablet (400 mg) once daily.    apixaban (ELIQUIS) 2.5 mg, oral, 2 times daily    carvedilol (COREG) 25 mg, 2 times daily    empagliflozin (JARDIANCE) 25 mg, Daily    furosemide (LASIX) 40 mg, oral, Every morning, Pt takes 40 mg in the morning and then additional 20 mg mid morning for a daily total of 60 mg.     furosemide (LASIX) 20 mg, oral, Daily, Pt takes 40 mg in the morning and then additional 20 mg mid morning for a daily total of 60 mg.     glipiZIDE XL (GLUCOTROL XL) 5 mg, Daily    lansoprazole (PREVACID) 15 mg, Daily PRN    Lantus U-100 Insulin 22 Units, subcutaneous, Nightly, Take as directed per insulin instructions.   Pt states that he was originally prescribed 22 units at bedtime, but adjust based on his blood glucose level anywhere from 15 units to 22 units subcutaneously at bedtime.    levothyroxine (SYNTHROID, LEVOXYL) 75 mcg, Daily RT    lisinopril 10 mg, Daily    oxyCODONE-acetaminophen (Percocet) 5-325 mg  tablet 1 tablet, oral, Every 8 hours PRN, Pt states he takes 1 to 2 tablets by mouth daily as needed for pain    POTASSIUM CHLORIDE ORAL 99 mg, oral, Daily    rosuvastatin (CRESTOR) 10 mg, Daily    spironolactone (ALDACTONE) 25 mg, oral, Every 24 hours scheduled    tamsulosin (FLOMAX) 0.4 mg, Nightly     Physical Exam  Vitals reviewed.   Constitutional:       Appearance: Normal appearance. He is normal weight.   HENT:      Head: Normocephalic and atraumatic.   Neck:      Vascular: No carotid bruit or JVD.   Cardiovascular:      Rate and Rhythm: Normal rate and regular rhythm.      Pulses: Normal pulses.      Heart sounds: Normal heart sounds.      Comments: ICD left upper chest  Pulmonary:      Effort: Pulmonary effort is normal.      Breath sounds: Normal breath sounds.   Chest:      Chest wall: No tenderness.   Abdominal:      General: Abdomen is flat. Bowel sounds are normal.      Palpations: Abdomen is soft.   Skin:     General: Skin is warm and dry.   Neurological:      General: No focal deficit present.      Mental Status: He is alert and oriented to person, place, and time. Mental status is at baseline.   Psychiatric:         Mood and Affect: Mood normal.         Behavior: Behavior normal.         Assessment/Plan   Assessment   89 y.o. male presenting with PMH of HFrEF/NICM (EF 20-25% 1/2025) s/p CRT-D, CHB, CKD3, T2DM, HTN, HLD, GERD, BPH, hypothyroidism, V. 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 Labs significant for potassium 3.9, BUN 31, creatinine 1.93, BNP 1154, troponin 27,23. CT chest showed mild cardiomegaly, moderate emphysematous changes, no acute consolidation. [facility] 9/2024 with nonobstructive CAD. Weight down 12 lbs since last admission. Currently on room air without cardiac symptoms.     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% s/p CRT-D  Non-sustained ventricular tachycardia s/p ablation  Non obstructive  CAD  Persistent atrial fibrillation (on Eliquis)  CKD3a    Plan  -Device check with 100% AF, No VT/VF or shocks  -NPO at midnight for DCC tomorrow  -IV Lasix 40 mg x1  -Continue Eliquis 2.5 mg BID  -Continue amiodarone 200 mg BID  -Continue Coreg 25 mg BID, lisinopril 10 mg daily, Crestor 10 mg daily, spironolactone 25 mg daily    Peripheral IV 03/26/25 20 G Left Antecubital (Active)   Site Assessment Clean;Dry;Intact 03/27/25 0801   Dressing Status Clean;Dry 03/27/25 0801   Number of days: 1       Code Status:  Full Code        Sarath Bourne, APRN-CNP

## 2025-03-27 NOTE — CONSULTS
Inpatient consult to Podiatry  Consult performed by: Lavern Kumar DPM  Consult ordered by: Maki Menchaca PA-C  Reason for consult: Right hallux infection Left anterior ankle skin tear        Reason For Consult  Right hallux infection  Left anterior ankle skin tear    History Of Present Illness  Valentin Rojas is a 89 y.o. male presenting with a possible infected right great toenail.  He relates that within the last couple of weeks he soaked his great toenails in epsom salts and then peeled away the toenails.  Admits that left great toenail came off pretty easily, but right great toenail did not.  Feels like there is still a piece of nail in there.  Worried that right great toenail is infected now and is concerned about amputation.  Patient is diabetic.  Does not see podiatry regularly.     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 Tach s/p ablation, A-fib (on Eliquis)       Surgical History  He has no past surgical history on file.     Social History  He reports that he has never smoked. He has never been exposed to tobacco smoke. He has never used smokeless tobacco. No history on file for alcohol use and drug use.    Family History  No family history on file.     Allergies  Penicillins    Review of Systems  Per above     Physical Exam   Physical Exam  Gen: NAD  Eyes:  EOM intact  ENT: MMM  Neck: No JVD  Respiratory: Diminished but no obvious wheezing  Cardiac: RRR, no murmurs rubs or gallops  Abdomen: soft, NT, +BS  Extremities: 2+ bilateral LE edema;  palpable pedal pulses;  absent left hallux nail with scab present;  erythema and edema to right hallux with retained nail to lateral proximal nail border;  seropurulent drainage;  superficial ulceration to left anterior ankle with granular base.  No deeper structures visualized.  Neuro: No focal deficits, alert and oriented x 3  Psych:  appropriate  mood and behavior.    Last Recorded Vitals  Blood pressure 116/73, pulse 70, temperature 36.7 °C (98.1 °F), temperature source Temporal, resp. rate 16, height 1.829 m (6'), weight 79.4 kg (175 lb), SpO2 92%.    Relevant Results  ECG 12 lead    Result Date: 3/26/2025  Ventricular-paced rhythm Biventricular pacemaker detected Abnormal ECG When compared with ECG of 26-MAR-2025 12:30, Vent. rate has decreased BY   2 BPM    CT chest wo IV contrast    Result Date: 3/26/2025  Interpreted By:  Steve Waite, STUDY: CT CHEST WO IV CONTRAST; 3/26/2025 1:56 pm   INDICATION: Signs/Symptoms:Abnormal chest x-ray with chest pain and shortness of breath.   COMPARISON: Chest x-ray obtained earlier the same day on 03/26/2025   ACCESSION NUMBER(S): [id number]   ORDERING CLINICIAN: ZAKIYA MAX   TECHNIQUE: Contiguous axial images of the thorax were obtained from the level of the thoracic inlet through the lung bases. 100 ml of Omnipaque 350 was utilized.All CT examinations are performed with 1 or more of the following dose reduction techniques: Automated exposure control, adjustment of mA and/or kv according to patient's size, or use of iterative reconstruction techniques.   FINDINGS: The thyroid gland is unremarkable.   The heart size is mildly enlarged. Coronary artery calcifications are present. No pericardial effusion is identified. The aorta and pulmonary arteries are normal in caliber.   There are no pathologically enlarged mediastinal, hilar, or axillary lymph nodes are seen. Calcified nonenlarged mediastinal lymph nodes are seen.   The trachea and mainstem bronchi are patent. Moderate diffuse emphysematous changes are seen, more prominent in the lung apices. No acute consolidation. Bronchiectasis in the bilateral lower lobes. There are mild dependent changes in the bilateral lower lobes most likely atelectasis. There is no evidence of pneumothorax or pleural effusion.   The visualized osseous structures are intact.    Limited images through the upper abdomen show prominent diverticulosis of the visualized colon. No evidence for acute findings in the visualized upper abdomen.       1. Mild cardiomegaly. 2. Moderate emphysematous changes. 3. No acute consolidation. 4. Bronchiectasis in the bilateral lower lobes. Mild dependent changes most likely atelectasis.     Signed by: Steve Waite 3/26/2025 2:26 PM Dictation workstation:   [id number]    ECG 12 lead    Result Date: 3/26/2025  Ventricular-paced rhythm Biventricular pacemaker detected Abnormal ECG When compared with ECG of 15-SWATI-2025 08:06, Vent. rate has increased BY   2 BPM See ED provider note for full interpretation and clinical correlation Confirmed by Lupe Mendoza ([id number]) on 3/26/2025 2:08:29 PM    XR chest 1 view    Result Date: 3/26/2025  Interpreted By:  Radha Edgar, STUDY: XR CHEST 1 VIEW;  3/26/2025 1:04 pm   INDICATION: Signs/Symptoms:Chest Pain.   COMPARISON: 01/12/2025   ACCESSION NUMBER(S): [id number]   ORDERING CLINICIAN: ZAKIYA MAX   FINDINGS: AP radiograph of the chest was provided.   Left subclavian pacemaker/AICD.   CARDIOMEDIASTINAL SILHOUETTE: Similar prominence of the upper mediastinum, which could relate to prominent vasculature or underlying mediastinal lesion. Persistently enlarged cardiomediastinal silhouette. Atherosclerotic calcifications of the aortic arch.   LUNGS: Similar mild interstitial prominence and trace left pleural effusion with atelectasis.   ABDOMEN: No remarkable upper abdominal findings.   BONES: No acute osseous changes.       1. Similar prominence of the upper mediastinum, which could relate to prominent vasculature or underlying mediastinal lesion. Further evaluation with CT chest is recommended. 2. Similar mild interstitial edema and trace left pleural effusion with atelectasis. Superimposed infection to be considered in the appropriate clinical context.     Signed by: Radha Edgar 3/26/2025 1:15 PM Dictation  workstation:   [id number]   Results for orders placed or performed during the hospital encounter of 03/26/25 (from the past 24 hours)   POCT GLUCOSE   Result Value Ref Range    POCT Glucose 183 (H) 74 - 99 mg/dL   POCT GLUCOSE   Result Value Ref Range    POCT Glucose 236 (H) 74 - 99 mg/dL   CBC   Result Value Ref Range    WBC 4.7 4.4 - 11.3 x10*3/uL    nRBC 0.0 0.0 - 0.0 /100 WBCs    RBC 5.04 4.50 - 5.90 x10*6/uL    Hemoglobin 14.1 13.5 - 17.5 g/dL    Hematocrit 44.5 41.0 - 52.0 %    MCV 88 80 - 100 fL    MCH 28.0 26.0 - 34.0 pg    MCHC 31.7 (L) 32.0 - 36.0 g/dL    RDW 19.9 (H) 11.5 - 14.5 %    Platelets 142 (L) 150 - 450 x10*3/uL   Basic metabolic panel   Result Value Ref Range    Glucose 76 74 - 99 mg/dL    Sodium 142 136 - 145 mmol/L    Potassium 3.4 (L) 3.5 - 5.3 mmol/L    Chloride 100 98 - 107 mmol/L    Bicarbonate 33 (H) 21 - 32 mmol/L    Anion Gap 12 10 - 20 mmol/L    Urea Nitrogen 30 (H) 6 - 23 mg/dL    Creatinine 1.73 (H) 0.50 - 1.30 mg/dL    eGFR 38 (L) >60 mL/min/1.73m*2    Calcium 8.3 (L) 8.6 - 10.3 mg/dL   Magnesium   Result Value Ref Range    Magnesium 2.35 1.60 - 2.40 mg/dL   POCT GLUCOSE   Result Value Ref Range    POCT Glucose 68 (L) 74 - 99 mg/dL   POCT GLUCOSE   Result Value Ref Range    POCT Glucose 159 (H) 74 - 99 mg/dL   POCT GLUCOSE   Result Value Ref Range    POCT Glucose 156 (H) 74 - 99 mg/dL        Assessment/Plan       Right hallux nail infection  Anesthetized right great toe with 5 ml's 1% lidocaine in digital block fashion.  Once anesthesia confirmed, prepped right hallux with betadine.  Utilized hemostat to grasp remaining nail and matrix and remove.  Small amount of hematoma expressed.  Irrigated with saline.  No more purulence seen.  Dressed with xeroform, 4x4's and paper tape.  Will monitor.  Hopefully, no antibiotics needed.  Left ankle ulceration  Xeroform and coversite.  Will follow.    I spent 45 minutes in the professional and overall care of this patient.

## 2025-03-27 NOTE — PROGRESS NOTES
Valentin Rojas is a 89 y.o. male on day 0 of admission presenting with Chest pressure.      Subjective   On oxygen overnight but no hypoxia documented. Will wean off and ask for walking pulse ox this morning. He denies chest pain, pressure or SOB today. He is ordering breakfast.       Objective     Last Recorded Vitals  /83 (BP Location: Right arm, Patient Position: Lying)   Pulse 71   Temp 36.5 °C (97.7 °F)   Resp 16   Wt 79.4 kg (175 lb)   SpO2 96%   Intake/Output last 3 Shifts:    Intake/Output Summary (Last 24 hours) at 3/27/2025 0825  Last data filed at 3/27/2025 0350  Gross per 24 hour   Intake 420 ml   Output 1430 ml   Net -1010 ml       Admission Weight  Weight: 79.4 kg (175 lb) (03/26/25 1230)    Daily Weight  03/26/25 : 79.4 kg (175 lb)    Image Results  ECG 12 lead  Ventricular-paced rhythm  Biventricular pacemaker detected  Abnormal ECG  When compared with ECG of 26-MAR-2025 12:30,  Vent. rate has decreased BY   2 BPM  CT chest wo IV contrast  Narrative: Interpreted By:  Steve Waite,   STUDY:  CT CHEST WO IV CONTRAST; 3/26/2025 1:56 pm      INDICATION:  Signs/Symptoms:Abnormal chest x-ray with chest pain and shortness of  breath.      COMPARISON:  Chest x-ray obtained earlier the same day on 03/26/2025      ACCESSION NUMBER(S):  [id number]      ORDERING CLINICIAN:  ZAKIYA MAX      TECHNIQUE:  Contiguous axial images of the thorax were obtained from the level of  the thoracic inlet through the lung bases. 100 ml of Omnipaque 350  was utilized.All CT examinations are performed with 1 or more of the  following dose reduction techniques: Automated exposure control,  adjustment of mA and/or kv according to patient's size, or use of  iterative reconstruction techniques.      FINDINGS:  The thyroid gland is unremarkable.      The heart size is mildly enlarged. Coronary artery calcifications are  present. No pericardial effusion is identified. The aorta and  pulmonary arteries are normal  in caliber.      There are no pathologically enlarged mediastinal, hilar, or axillary  lymph nodes are seen. Calcified nonenlarged mediastinal lymph nodes  are seen.      The trachea and mainstem bronchi are patent. Moderate diffuse  emphysematous changes are seen, more prominent in the lung apices. No  acute consolidation. Bronchiectasis in the bilateral lower lobes.  There are mild dependent changes in the bilateral lower lobes most  likely atelectasis. There is no evidence of pneumothorax or pleural  effusion.      The visualized osseous structures are intact.      Limited images through the upper abdomen show prominent  diverticulosis of the visualized colon. No evidence for acute  findings in the visualized upper abdomen.      Impression: 1. Mild cardiomegaly.  2. Moderate emphysematous changes.  3. No acute consolidation.  4. Bronchiectasis in the bilateral lower lobes. Mild dependent  changes most likely atelectasis.          Signed by: Steve Waite 3/26/2025 2:26 PM  Dictation workstation:   [id number]  ECG 12 lead  Ventricular-paced rhythm  Biventricular pacemaker detected  Abnormal ECG  When compared with ECG of 15-SWATI-2025 08:06,  Vent. rate has increased BY   2 BPM  See ED provider note for full interpretation and clinical correlation  Confirmed by Lupe Mendoza ([id number]) on 3/26/2025 2:08:29 PM  XR chest 1 view  Narrative: Interpreted By:  Radha Edgar,   STUDY:  XR CHEST 1 VIEW;  3/26/2025 1:04 pm      INDICATION:  Signs/Symptoms:Chest Pain.      COMPARISON:  01/12/2025      ACCESSION NUMBER(S):  [id number]      ORDERING CLINICIAN:  ZAKIYA MAX      FINDINGS:  AP radiograph of the chest was provided.      Left subclavian pacemaker/AICD.      CARDIOMEDIASTINAL SILHOUETTE:  Similar prominence of the upper mediastinum, which could relate to  prominent vasculature or underlying mediastinal lesion. Persistently  enlarged cardiomediastinal silhouette. Atherosclerotic calcifications  of the  aortic arch.      LUNGS:  Similar mild interstitial prominence and trace left pleural effusion  with atelectasis.      ABDOMEN:  No remarkable upper abdominal findings.      BONES:  No acute osseous changes.      Impression: 1. Similar prominence of the upper mediastinum, which could relate to  prominent vasculature or underlying mediastinal lesion. Further  evaluation with CT chest is recommended.  2. Similar mild interstitial edema and trace left pleural effusion  with atelectasis. Superimposed infection to be considered in the  appropriate clinical context.          Signed by: Radha Edgar 3/26/2025 1:15 PM  Dictation workstation:   [id number]      Physical Exam  Gen: NAD  Eyes:  EOM intact  ENT: MMM  Neck: No JVD  Respiratory: Diminished but no obvious wheezing  Cardiac: RRR, no murmurs rubs or gallops  Abdomen: soft, NT, +BS  Extremities: 2+ bilateral LE edema, right foot with dressing on great toe, left leg wound with dressing, left great toe missing nail with dried blood in nail base - photos reviewed in chart  Neuro: No focal deficits, alert and oriented x 3  Psych:  appropriate mood and behavior    Assessment/Plan      Chest pressure/SOB  -in the setting of HFrEF/NICM (EF 20-25% 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 Tach s/p ablation with mild CHF exacerbation and possible undiagnosed COPD  -OBS tele>possible run of V tach noted around 1 am this morning  -CXR: mild interstitial prominence/trace left pleural effusion with atelectasis  -CT chest: mild CM, moderate emphysematous changes, no acute consolidation, bronchiectasis in bilateral lower lobes, atelectasis  -currently on 2 L oxygen>wean this morning  -requested walking pulse ox on room air  -IS  -amiodarone, eliquis, lasix, lisinopril, aldactone  -cardiology consult  -he would benefit from outpt PFTs with pulm consult  -albuterol PRN  -denies symptoms this morning     Elevated troponin and BNP  -denies CP  -trop/BNP elevations improved  from Jan  -cardiology to see     Wounds  -bilateral great toes and left shin  -wound care RN dori today    Elevated bilirubin  -denies abdominal pain     CKD3  -baseline  -monitor  -avoid nephrotoxins     T2DM  -A1c 7.7  -accuchecks with ISS  -ADA diet  -Lantus     HTN/HLD  -amiodarone, lasix, lisinopril, aldactone  -crestor     GERD  -PPI     Hypothyroidism  -check TSH elevated as is T4>repeat labs with PCP  -continue synthroid     DVT prophy  -eliquis       Dispo: continue obs, await CS recs    CARMEN Roberts-C  D/w Dr. Ramirez

## 2025-03-27 NOTE — CARE PLAN
The patient's goals for the shift include  see a podiatrist    The clinical goals for the shift include wean off oxygen    Problem: Respiratory  Goal: Clear secretions with interventions this shift  Outcome: Progressing     Problem: Respiratory  Goal: Minimal/no exertional discomfort or dyspnea this shift  Outcome: Progressing     Problem: Respiratory  Goal: Patent airway maintained this shift  Outcome: Progressing

## 2025-03-27 NOTE — CONSULTS
"  Wound Care Consult     Visit Date: 3/27/2025      Patient Name: Valentin Rojas         MRN: [id number]           YOB: 1935     Reason for Consult: wound        Wound History: patient soaked feet in epsom salts, cut toe nails with clippers, right great toe nail off rt sticking out too far.     Pertinent Labs:   Albumin   Date Value Ref Range Status   03/26/2025 4.1 3.4 - 5.0 g/dL Final       Wound Assessment:  Wound 03/26/25 Dorsal foot;Right (Active)   Wound Image   03/26/25 1734   Dressing Status Clean;Dry 03/27/25 0911       Wound 03/26/25 Pretibial Distal;Left (Active)   Wound Image   03/26/25 1735   Dressing Status Clean;Dry 03/27/25 0911       Wound 03/26/25 Dorsal foot;Left (Active)   Wound Image   03/26/25 1739   Dressing Status Clean;Dry 03/27/25 0911       Wound Team Summary Assessment: Very pleasant 89 year old retired  seen in bed.  BLE edema- \"nothing new\".  - Left front lower shin 1 x 0.5 cm wound from a \"fire starter\" that fell.  - Left great toe nail very small and hard, dark, bumpy  - Right great toe, red, edema, possible bullae developing,  no toe nail     Wound Team Plan: Consult to podiatry, wound care with XEROFORM and DSD, pending consult recommendations.  Legs elevated on 2 pillows to aid in edema. Consider compression. Please message with questions.     Juani Carranza RN, [facility]  3/27/2025  2:05 PM        "

## 2025-03-28 ENCOUNTER — APPOINTMENT (OUTPATIENT)
Dept: CARDIOLOGY | Facility: HOSPITAL | Age: 89
DRG: 309 | End: 2025-03-28
Payer: MEDICARE

## 2025-03-28 ENCOUNTER — ANESTHESIA (OUTPATIENT)
Dept: CARDIOLOGY | Facility: HOSPITAL | Age: 89
End: 2025-03-28
Payer: MEDICARE

## 2025-03-28 ENCOUNTER — ANESTHESIA EVENT (OUTPATIENT)
Dept: CARDIOLOGY | Facility: HOSPITAL | Age: 89
End: 2025-03-28
Payer: MEDICARE

## 2025-03-28 PROBLEM — E11.9 DIABETES MELLITUS, TYPE 2 (MULTI): Status: ACTIVE | Noted: 2025-03-28

## 2025-03-28 PROBLEM — Z95.0 PACEMAKER: Status: ACTIVE | Noted: 2025-03-28

## 2025-03-28 PROBLEM — Z79.01 ANTICOAGULANT LONG-TERM USE: Status: ACTIVE | Noted: 2025-03-28

## 2025-03-28 LAB
ANION GAP SERPL CALC-SCNC: 14 MMOL/L (ref 10–20)
ATRIAL RATE: 46 BPM
ATRIAL RATE: 70 BPM
ATRIAL RATE: 72 BPM
BODY SURFACE AREA: 2.01 M2
BUN SERPL-MCNC: 29 MG/DL (ref 6–23)
CALCIUM SERPL-MCNC: 8.1 MG/DL (ref 8.6–10.3)
CHLORIDE SERPL-SCNC: 100 MMOL/L (ref 98–107)
CO2 SERPL-SCNC: 31 MMOL/L (ref 21–32)
CREAT SERPL-MCNC: 1.79 MG/DL (ref 0.5–1.3)
EGFRCR SERPLBLD CKD-EPI 2021: 36 ML/MIN/1.73M*2
ERYTHROCYTE [DISTWIDTH] IN BLOOD BY AUTOMATED COUNT: 19.6 % (ref 11.5–14.5)
GLUCOSE BLD MANUAL STRIP-MCNC: 150 MG/DL (ref 74–99)
GLUCOSE BLD MANUAL STRIP-MCNC: 173 MG/DL (ref 74–99)
GLUCOSE BLD MANUAL STRIP-MCNC: 279 MG/DL (ref 74–99)
GLUCOSE SERPL-MCNC: 92 MG/DL (ref 74–99)
HCT VFR BLD AUTO: 46 % (ref 41–52)
HGB BLD-MCNC: 14.4 G/DL (ref 13.5–17.5)
MAGNESIUM SERPL-MCNC: 2.22 MG/DL (ref 1.6–2.4)
MCH RBC QN AUTO: 28 PG (ref 26–34)
MCHC RBC AUTO-ENTMCNC: 31.3 G/DL (ref 32–36)
MCV RBC AUTO: 90 FL (ref 80–100)
NRBC BLD-RTO: 0 /100 WBCS (ref 0–0)
P OFFSET: 149 MS
P ONSET: 85 MS
PLATELET # BLD AUTO: 144 X10*3/UL (ref 150–450)
POTASSIUM SERPL-SCNC: 3.5 MMOL/L (ref 3.5–5.3)
Q ONSET: 182 MS
Q ONSET: 186 MS
Q ONSET: 189 MS
QRS COUNT: 11 BEATS
QRS COUNT: 12 BEATS
QRS COUNT: 12 BEATS
QRS DURATION: 184 MS
QRS DURATION: 192 MS
QRS DURATION: 194 MS
QT INTERVAL: 518 MS
QT INTERVAL: 518 MS
QT INTERVAL: 536 MS
QTC CALCULATION(BAZETT): 559 MS
QTC CALCULATION(BAZETT): 570 MS
QTC CALCULATION(BAZETT): 578 MS
QTC FREDERICIA: 545 MS
QTC FREDERICIA: 553 MS
QTC FREDERICIA: 564 MS
R AXIS: 198 DEGREES
R AXIS: 202 DEGREES
R AXIS: 209 DEGREES
RBC # BLD AUTO: 5.14 X10*6/UL (ref 4.5–5.9)
SODIUM SERPL-SCNC: 141 MMOL/L (ref 136–145)
T AXIS: 33 DEGREES
T AXIS: 34 DEGREES
T AXIS: 51 DEGREES
T OFFSET: 445 MS
T OFFSET: 448 MS
T OFFSET: 450 MS
VENTRICULAR RATE: 70 BPM
VENTRICULAR RATE: 70 BPM
VENTRICULAR RATE: 73 BPM
WBC # BLD AUTO: 4.5 X10*3/UL (ref 4.4–11.3)

## 2025-03-28 PROCEDURE — 93005 ELECTROCARDIOGRAM TRACING: CPT

## 2025-03-28 PROCEDURE — 85027 COMPLETE CBC AUTOMATED: CPT | Performed by: PHYSICIAN ASSISTANT

## 2025-03-28 PROCEDURE — 4B02XTZ MEASUREMENT OF CARDIAC DEFIBRILLATOR, EXTERNAL APPROACH: ICD-10-PCS | Performed by: STUDENT IN AN ORGANIZED HEALTH CARE EDUCATION/TRAINING PROGRAM

## 2025-03-28 PROCEDURE — 5A2204Z RESTORATION OF CARDIAC RHYTHM, SINGLE: ICD-10-PCS | Performed by: STUDENT IN AN ORGANIZED HEALTH CARE EDUCATION/TRAINING PROGRAM

## 2025-03-28 PROCEDURE — 7100000001 HC RECOVERY ROOM TIME - INITIAL BASE CHARGE

## 2025-03-28 PROCEDURE — 3700000002 HC GENERAL ANESTHESIA TIME - EACH INCREMENTAL 1 MINUTE

## 2025-03-28 PROCEDURE — 92960 CARDIOVERSION ELECTRIC EXT: CPT

## 2025-03-28 PROCEDURE — 2500000001 HC RX 250 WO HCPCS SELF ADMINISTERED DRUGS (ALT 637 FOR MEDICARE OP): Performed by: PHYSICIAN ASSISTANT

## 2025-03-28 PROCEDURE — 2500000004 HC RX 250 GENERAL PHARMACY W/ HCPCS (ALT 636 FOR OP/ED): Performed by: NURSE ANESTHETIST, CERTIFIED REGISTERED

## 2025-03-28 PROCEDURE — 99232 SBSQ HOSP IP/OBS MODERATE 35: CPT | Performed by: FAMILY MEDICINE

## 2025-03-28 PROCEDURE — 1200000002 HC GENERAL ROOM WITH TELEMETRY DAILY

## 2025-03-28 PROCEDURE — 2500000004 HC RX 250 GENERAL PHARMACY W/ HCPCS (ALT 636 FOR OP/ED): Performed by: NURSE PRACTITIONER

## 2025-03-28 PROCEDURE — 82947 ASSAY GLUCOSE BLOOD QUANT: CPT

## 2025-03-28 PROCEDURE — 99232 SBSQ HOSP IP/OBS MODERATE 35: CPT | Performed by: NURSE PRACTITIONER

## 2025-03-28 PROCEDURE — 36415 COLL VENOUS BLD VENIPUNCTURE: CPT | Performed by: PHYSICIAN ASSISTANT

## 2025-03-28 PROCEDURE — 3700000001 HC GENERAL ANESTHESIA TIME - INITIAL BASE CHARGE

## 2025-03-28 PROCEDURE — 83735 ASSAY OF MAGNESIUM: CPT | Performed by: PHYSICIAN ASSISTANT

## 2025-03-28 PROCEDURE — 7100000010 HC PHASE TWO TIME - EACH INCREMENTAL 1 MINUTE

## 2025-03-28 PROCEDURE — 94760 N-INVAS EAR/PLS OXIMETRY 1: CPT

## 2025-03-28 PROCEDURE — 80048 BASIC METABOLIC PNL TOTAL CA: CPT | Performed by: PHYSICIAN ASSISTANT

## 2025-03-28 PROCEDURE — 7100000002 HC RECOVERY ROOM TIME - EACH INCREMENTAL 1 MINUTE

## 2025-03-28 PROCEDURE — 93284 PRGRMG EVAL IMPLANTABLE DFB: CPT | Performed by: STUDENT IN AN ORGANIZED HEALTH CARE EDUCATION/TRAINING PROGRAM

## 2025-03-28 PROCEDURE — 2500000002 HC RX 250 W HCPCS SELF ADMINISTERED DRUGS (ALT 637 FOR MEDICARE OP, ALT 636 FOR OP/ED): Performed by: PHYSICIAN ASSISTANT

## 2025-03-28 PROCEDURE — 7100000009 HC PHASE TWO TIME - INITIAL BASE CHARGE

## 2025-03-28 RX ORDER — FUROSEMIDE 10 MG/ML
60 INJECTION INTRAMUSCULAR; INTRAVENOUS ONCE
Status: COMPLETED | OUTPATIENT
Start: 2025-03-28 | End: 2025-03-28

## 2025-03-28 RX ORDER — PROPOFOL 10 MG/ML
INJECTION, EMULSION INTRAVENOUS AS NEEDED
Status: DISCONTINUED | OUTPATIENT
Start: 2025-03-28 | End: 2025-03-28

## 2025-03-28 RX ADMIN — ROSUVASTATIN CALCIUM 10 MG: 10 TABLET, FILM COATED ORAL at 20:28

## 2025-03-28 RX ADMIN — OXYCODONE HYDROCHLORIDE AND ACETAMINOPHEN 1 TABLET: 5; 325 TABLET ORAL at 11:02

## 2025-03-28 RX ADMIN — AMIODARONE HYDROCHLORIDE 200 MG: 200 TABLET ORAL at 20:28

## 2025-03-28 RX ADMIN — CARVEDILOL 25 MG: 25 TABLET, FILM COATED ORAL at 20:28

## 2025-03-28 RX ADMIN — APIXABAN 2.5 MG: 2.5 TABLET, FILM COATED ORAL at 09:27

## 2025-03-28 RX ADMIN — PANTOPRAZOLE SODIUM 40 MG: 40 TABLET, DELAYED RELEASE ORAL at 05:05

## 2025-03-28 RX ADMIN — TAMSULOSIN HYDROCHLORIDE 0.4 MG: 0.4 CAPSULE ORAL at 20:28

## 2025-03-28 RX ADMIN — INSULIN LISPRO 6 UNITS: 100 INJECTION, SOLUTION INTRAVENOUS; SUBCUTANEOUS at 16:26

## 2025-03-28 RX ADMIN — INSULIN GLARGINE 15 UNITS: 100 INJECTION, SOLUTION SUBCUTANEOUS at 20:35

## 2025-03-28 RX ADMIN — SPIRONOLACTONE 25 MG: 25 TABLET ORAL at 09:27

## 2025-03-28 RX ADMIN — APIXABAN 2.5 MG: 2.5 TABLET, FILM COATED ORAL at 20:28

## 2025-03-28 RX ADMIN — LEVOTHYROXINE SODIUM 75 MCG: 0.07 TABLET ORAL at 05:05

## 2025-03-28 RX ADMIN — AMIODARONE HYDROCHLORIDE 200 MG: 200 TABLET ORAL at 09:27

## 2025-03-28 RX ADMIN — FUROSEMIDE 60 MG: 10 INJECTION, SOLUTION INTRAVENOUS at 10:36

## 2025-03-28 RX ADMIN — OXYCODONE HYDROCHLORIDE AND ACETAMINOPHEN 1 TABLET: 5; 325 TABLET ORAL at 18:31

## 2025-03-28 RX ADMIN — PROPOFOL 40 MG: 10 INJECTION, EMULSION INTRAVENOUS at 07:58

## 2025-03-28 RX ADMIN — CARVEDILOL 25 MG: 25 TABLET, FILM COATED ORAL at 09:27

## 2025-03-28 RX ADMIN — LISINOPRIL 10 MG: 10 TABLET ORAL at 09:27

## 2025-03-28 SDOH — HEALTH STABILITY: MENTAL HEALTH: CURRENT SMOKER: 0

## 2025-03-28 ASSESSMENT — PAIN DESCRIPTION - LOCATION
LOCATION: KNEE
LOCATION: KNEE

## 2025-03-28 ASSESSMENT — PAIN SCALES - GENERAL
PAINLEVEL_OUTOF10: 6
PAINLEVEL_OUTOF10: 0 - NO PAIN
PAINLEVEL_OUTOF10: 7
PAINLEVEL_OUTOF10: 0 - NO PAIN

## 2025-03-28 ASSESSMENT — COGNITIVE AND FUNCTIONAL STATUS - GENERAL
WALKING IN HOSPITAL ROOM: A LITTLE
TOILETING: A LITTLE
CLIMB 3 TO 5 STEPS WITH RAILING: A LITTLE
DRESSING REGULAR LOWER BODY CLOTHING: A LITTLE
MOBILITY SCORE: 20
DRESSING REGULAR UPPER BODY CLOTHING: A LITTLE
CLIMB 3 TO 5 STEPS WITH RAILING: A LITTLE
DAILY ACTIVITIY SCORE: 20
DAILY ACTIVITIY SCORE: 20
HELP NEEDED FOR BATHING: A LITTLE
DRESSING REGULAR UPPER BODY CLOTHING: A LITTLE
STANDING UP FROM CHAIR USING ARMS: A LITTLE
STANDING UP FROM CHAIR USING ARMS: A LITTLE
HELP NEEDED FOR BATHING: A LITTLE
MOVING TO AND FROM BED TO CHAIR: A LITTLE
MOBILITY SCORE: 20
TOILETING: A LITTLE
MOVING TO AND FROM BED TO CHAIR: A LITTLE
WALKING IN HOSPITAL ROOM: A LITTLE
DRESSING REGULAR LOWER BODY CLOTHING: A LITTLE

## 2025-03-28 ASSESSMENT — ACTIVITIES OF DAILY LIVING (ADL): LACK_OF_TRANSPORTATION: NO

## 2025-03-28 ASSESSMENT — PAIN - FUNCTIONAL ASSESSMENT
PAIN_FUNCTIONAL_ASSESSMENT: 0-10

## 2025-03-28 ASSESSMENT — PAIN DESCRIPTION - ORIENTATION
ORIENTATION: LEFT
ORIENTATION: LEFT

## 2025-03-28 NOTE — PROGRESS NOTES
Subjective Data:  Denies CP, dyspnea, remains on room air    Objective Data:  Last Recorded Vitals:  Vitals:    03/28/25 0738 03/28/25 0745 03/28/25 0806 03/28/25 0815   BP: 127/89 119/84 108/71 113/75   Pulse: 70 70 83 70   Resp: 16 16 20 15   Temp:       TempSrc:       SpO2: 96% 95% 94% 97%   Weight:       Height:           Last Labs:  CBC - 3/28/2025:  6:29 AM  4.5 14.4 144    46.0      CMP - 3/28/2025:  6:29 AM  8.1 7.3 25 --- 1.7   3.3 4.1 16 145      PTT - No results in last year.  _   _ _     TROPHS   Date/Time Value Ref Range Status   03/26/2025 01:41 PM 23 0 - 20 ng/L Final   03/26/2025 12:38 PM 27 0 - 20 ng/L Final   01/12/2025 10:48 PM 59 0 - 20 ng/L Final     Comment:     Previous result verified on 1/12/2025 2156 on specimen/case [id number] called with component [facility] for procedure Troponin I, High Sensitivity, Initial with value 66 ng/L.     BNP   Date/Time Value Ref Range Status   03/26/2025 12:38 PM 1,154 0 - 99 pg/mL Final   01/12/2025 09:22 PM 1,216 0 - 99 pg/mL Final     HGBA1C   Date/Time Value Ref Range Status   08/29/2024 06:58 PM 7.7 4.7 - 6.4 % Final     Comment:     Interpretation:     Standardized A1c  Good control or normal:  4-6% ( mg/dL avg)  Moderate control:        6.1-8.0% (120-180 mg/dL avg)  Poor control:            >8.0% (180 mg/dL avg)  With 4% as a baseline, each 1% increase = 30 mg/dL increase in average   glucose.  Taken from DCCT (Diabetes Control Complications Trial)   08/20/2024 09:31 AM 8.1 4.7 - 6.4 % Final     Comment:     Interpretation:     Standardized A1c  Good control or normal:  4-6% ( mg/dL avg)  Moderate control:        6.1-8.0% (120-180 mg/dL avg)  Poor control:            >8.0% (180 mg/dL avg)  With 4% as a baseline, each 1% increase = 30 mg/dL increase in average   glucose.  Taken from DCCT (Diabetes Control Complications Trial)      Last I/O:  I/O last 3 completed shifts:  In: 780 (9.8 mL/kg) [P.O.:780]  Out: 3530 (44.5 mL/kg) [Urine:3530 (1.2  mL/kg/hr)]  Weight: 79.4 kg     Past Cardiology Tests (Last 3 Years):  EKG:  ECG 12 lead   V paced     Echo:  Transthoracic Echo (TTE) Complete 01/13/2025  CONCLUSIONS:   1. The left ventricular systolic function is severely decreased, with a visually estimated ejection fraction of 20-25%.   2. There is global hypokinesis of the left ventricle with minor regional variations.   3. Left ventricular cavity size is moderately dilated.   4. There is reduced right ventricular systolic function.   5. Severely enlarged right ventricle.   6. The left atrium is severely dilated.   7. The right atrium is moderately dilated.   8. Mild to moderate mitral valve regurgitation.   9. Mild to moderate tricuspid regurgitation visualized.  10. Right ventricular systolic pressure is within normal limits.  11. There is moderate pulmonic valve regurgitation.     9/3/2024 [facility]  CONCLUSIONS:   - Exam indication: Evaluation of known heart failure to guide therapy   - The left ventricle is normal in size. There is mild posterior left ventricular   hypertrophy. Left ventricular systolic function is moderately decreased. EF = 36 ±    5% (2D biplane) Left ventricular diastolic function was not evaluated due to   pacing.   - The right ventricle is dilated. Right ventricular systolic function is normal.   - The left atrial cavity is severely dilated.   - The right atrial cavity is mildly dilated.   - The visualized aorta is borderline dilated with a maximal dimension of 3.9 cm.   - There is moderate (2+) tricuspid valve regurgitation.   - Exam was compared with the prior  echocardiographic exam performed on   2/14/2020, LV systolic function has improved ( it was 20%)      Ejection Fractions:        EF   Date/Time Value Ref Range Status   01/13/2025 09:30 AM 23 %        Cath:  No results found for this or any previous visit from the past 1095 days.SERVICE DATE: 9/3/2024   SERVICE TIME:  1:07 PM       CARDIOLOGIST: Amber Hurley MD    ATTENDING: Lorri Smith MD   PRIMARY CARE PHYSICIAN: Puja Barrera DO   REFERRING PROVIDER: Puja Barrera DO     [place] STUDY OF HEALTH and AGING SCALE:6=Moderately Frail     CARDIOVASCULAR INSTABILITY:No     PRE-PROCEDURE DIAGNOSIS:   Abnormal Stress Test     POST PROCEDURE DIAGNOSIS:   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 Moderate sedation consisting of continuous ECG,   pulse oximetry and cardiopulmonary monitoring was performed by   the Cardiology Nurse, overseen by supervising physician, for an   intra-service time of 0 hr. 20 min.     Sedative Medications:   Drug: Versed   Dose: 1 mg   Route: IV     Drug: Fentanyl   Dose: 25 mcg   Route: IV   PRIORITY AT TIME OF PROCEDURE: Elective     SITE OF ENTRY: Radial:Right Radial     CONTRAST: Omnipaque 350 mg Iodine/mL (iohexol injection,   solution): 25 mL     ADDITIONAL PROCEDURES     CORONARY ANGIOGRAPHY:   The patient was taken to the cardiac cath lab where the entry   site was prepped and draped in a sterile manner. Under local   anesthesic with 2% Lidocaine, the vessel was cannulated with   Seldinger's technique using an arterial needle and a 6F sheath   was introduced.     Selective injections were made in the left and right coronary   arteries and various right, left and oblique views were obtained.       ADDITIONAL PROCEDURES     LEFT MAIN TRUNK: No Stenosis     LEFT ANTERIOR DESCENDIN% Stenosis     Location: Proximal      DIAGONAL #1: No Stenosis   DIAGONAL #2: No Stenosis     LEFT CIRCUMFLEX: 20% Stenosis    Location: Proximal     MARGINAL #1: No Stenosis     MARGINAL #2: No Stenosis     MARGINAL #3: No Stenosis     DOMINANT: YES   POSTERIOR DESCENDIN% Stenosis    Location: Proximal      RIGHT CORONARY: No Stenosis     DOMINANT: NO     Hemodynamics:    LVEDP: 8 mm Hg.   LV-Ao gradient : 0 mm Hg.   LVEF: Not done. LEVEF available by nuclear image. + CKD.       The sheath was removed and hemostatsis was established using   manual pressure using wrist band. There was no bleeding at the   end of the procedure. The pt was returned to the recovery room in   a stable condition     ESTIMATED BLOOD LOSS: Less Than Minimal Unless Noted Here.     COMPLICATIONS: None     SPECIMENS: No specimens obtained unless noted here.     CONDITION: Stable     RECOMMENDATIONS: Follow protocol of post-op orders. Aggressive   modification of risk factors. [employer] [facility].   SIGNATURE: Amber Hurley MD PATIENT NAME: Valentin Rojas      Stress Test:  Nuclear Stress Test 08/30/2024  PATIENT:   Name: MR. VALENTIN ROJAS   MRN: [id number]   Age: 89 years   Gender: M     CONCLUSIONS:    1. SPECT Perfusion Study: Abnormal.    2. There is mild (<10%) ischemia in the territory of the LCX.    3. There is a small (<10%) fixed perfusion defect in the RCA territory.    4. Left ventricle is severely dilated. The left ventricle systolic   function is moderately decreased.    5. This is an intermediate risk scan.      Cardiac Imaging:  No results found for this or any previous visit from the past 1095 days.                      Inpatient Medications:  Scheduled medications   Medication Dose Route Frequency    amiodarone  200 mg oral BID    apixaban  2.5 mg oral BID    carvedilol  25 mg oral BID    furosemide  20 mg oral Daily    furosemide  40 mg oral q AM    insulin glargine  15 Units subcutaneous Nightly    insulin lispro  0-10 Units subcutaneous TID AC    levothyroxine  75 mcg oral Daily    lisinopril  10 mg oral Daily    pantoprazole  40 mg oral Daily before breakfast    Or    pantoprazole  40 mg intravenous Daily before breakfast    polyethylene glycol  17 g oral Daily    rosuvastatin  10 mg oral Nightly    spironolactone  25 mg oral q24h VENKATESH    tamsulosin  0.4 mg oral Nightly      PRN medications   Medication    acetaminophen    Or    acetaminophen    Or    acetaminophen    albuterol    dextrose    dextrose    glucagon    glucagon    melatonin    ondansetron    Or    ondansetron    oxyCODONE-acetaminophen     Continuous Medications   Medication Dose Last Rate    oxygen   2 L/min (03/26/25 1236)       Vitals reviewed.   Constitutional:       Appearance: Normal appearance. He is normal weight.   HENT:      Head: Normocephalic and atraumatic.   Neck:      Vascular: JVD.   Cardiovascular:      Rate and Rhythm: Normal rate and regular rhythm.      Pulses: Normal pulses.      Heart sounds: Normal heart sounds.      Comments: ICD left upper chest  Pulmonary:      Effort: Pulmonary effort is normal.      Breath sounds: Normal breath sounds.   Chest:      Chest wall: No tenderness.   Abdominal:      General: Abdomen is flat. Bowel sounds are normal.      Palpations: Abdomen is soft.   Skin:     General: Skin is warm and dry.   Neurological:      General: No focal deficit present.      Mental Status: He is alert and oriented to person, place, and time. Mental status is at baseline.   Psychiatric:         Mood and Affect: Mood normal.         Behavior: Behavior normal.      Assessment/Plan   Assessment   89 y.o. male presenting with PMH of HFrEF/NICM (EF 20-25% 1/2025) s/p CRT-D, CHB, CKD3, T2DM, HTN, HLD, GERD, BPH, hypothyroidism, V. 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 Labs significant for potassium 3.9, BUN 31, creatinine 1.93, BNP 1154, troponin 27,23. CT chest showed mild cardiomegaly, moderate emphysematous changes, no acute consolidation. [facility] 9/2024 with nonobstructive CAD. Weight down 12 lbs since last admission. Currently on room air without cardiac symptoms.      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% s/p CRT-D  Non-sustained ventricular tachycardia s/p ablation  Non obstructive CAD  Persistent  atrial fibrillation (on Eliquis)  CKD3a     Plan  -Device check with 100% AF, No VT/VF or shocks  -S/p DCC this am with successful conversion to NSR  -IV Lasix 60 mg x1, resume home Lasix 40 in am 20 in pm tomorrow  -Continue Eliquis 2.5 mg BID  -Continue amiodarone 200 mg BID  -Continue Coreg 25 mg BID, lisinopril 10 mg daily, Crestor 10 mg daily, spironolactone 25 mg daily  -OP follow up with general cardiology and EP  -OK for discharge    Peripheral IV 03/26/25 20 G Left Antecubital (Active)   Site Assessment Clean;Dry;Intact 03/27/25 2030   Dressing Type Transparent 03/27/25 2030   Line Status Saline locked 03/27/25 2030   Dressing Status Clean;Dry 03/27/25 2030   Number of days: 2       Code Status:  Full Code          Sarath Bourne, LAUREN-CNP

## 2025-03-28 NOTE — PROGRESS NOTES
03/28/25 1537   Discharge Planning   Living Arrangements Spouse/significant other   Support Systems Spouse/significant other;Family members;Friends/neighbors   Assistance Needed Alert and oriented x 4, Independent with ADL's, Drives; Cane and walker used at home, Room air baseline, on room air here , PCP Dr Puja Barrera; on Eliquis prior to hospitalization   Type of Residence Private residence   Number of Stairs to Enter Residence 4   Number of Stairs Within Residence 0   Do you have animals or pets at home? No   Who is requesting discharge planning? Provider   Home or Post Acute Services None   Expected Discharge Disposition Home  (Patient and spouse both denying discharge needs at this time.)   Does the patient need discharge transport arranged? No   Financial Resource Strain   How hard is it for you to pay for the very basics like food, housing, medical care, and heating? Not hard   Housing Stability   In the last 12 months, was there a time when you were not able to pay the mortgage or rent on time? N   In the past 12 months, how many times have you moved where you were living? 0   At any time in the past 12 months, were you homeless or living in a shelter (including now)? N   Transportation Needs   In the past 12 months, has lack of transportation kept you from medical appointments or from getting medications? no   In the past 12 months, has lack of transportation kept you from meetings, work, or from getting things needed for daily living? No   Patient Choice   Provider Choice list and CMS website (https://medicare.gov/care-compare#search) for post-acute Quality and Resource Measure Data were provided and reviewed with: Patient;Family   Patient / Family choosing to utilize agency / facility established prior to hospitalization No   Stroke Family Assessment   Stroke Family Assessment Needed No   Intensity of Service   Intensity of Service 0-30 min

## 2025-03-28 NOTE — ANESTHESIA POSTPROCEDURE EVALUATION
Patient: Valentin Rojas    Procedure Summary       Date: 03/28/25 Room / Location: [facility]    Anesthesia Start: 0756 Anesthesia Stop: 0807    Procedure: CARDIOVERSION EXTERNAL Diagnosis: Chest pressure    Scheduled Providers: Nicole Butler MD Responsible Provider: Pako Ruggiero MD    Anesthesia Type: general ASA Status: 3            Anesthesia Type: general    Vitals Value Taken Time   /75 03/28/25 0815        Pulse 70 03/28/25 0815   Resp 15 03/28/25 0815   SpO2 97 % 03/28/25 0815       Anesthesia Post Evaluation    Patient location during evaluation: bedside  Patient participation: complete - patient participated  Level of consciousness: awake and alert  Pain management: adequate  Airway patency: patent  Cardiovascular status: acceptable  Respiratory status: acceptable  Hydration status: acceptable  Postoperative Nausea and Vomiting: none      No notable events documented.

## 2025-03-28 NOTE — CARE PLAN
Problem: Respiratory  Goal: Minimal/no exertional discomfort or dyspnea this shift  Outcome: Progressing  Goal: No signs of respiratory distress (eg. Use of accessory muscles. Peds grunting)  Outcome: Progressing   The patient's goals for the shift include      The clinical goals for the shift include wean off oxygen    Pt on RA. No c/o SOB. VSS

## 2025-03-28 NOTE — PROGRESS NOTES
Valentin Rojas is a 89 y.o. male on day 1 of admission presenting with Chest pressure.      Subjective   Patient resting in bed after cardioversion, denies any current complaints except just feeling tired.     Objective     Last Recorded Vitals  /81 (BP Location: Left arm)   Pulse 70   Temp 36.8 °C (98.2 °F)   Resp 16   Wt 79.4 kg (175 lb)   SpO2 92%   Intake/Output last 3 Shifts:    Intake/Output Summary (Last 24 hours) at 3/28/2025 1630  Last data filed at 3/28/2025 1056  Gross per 24 hour   Intake 840 ml   Output 1800 ml   Net -960 ml       Admission Weight  Weight: 79.4 kg (175 lb) (03/26/25 1230)    Daily Weight  03/26/25 : 79.4 kg (175 lb)        Physical Exam  Constitutional: alert and oriented x 3, awake, cooperative, no acute distress  Skin: warm and dry, bilateral great toe dressings in place  Head/Neck: Normocephalic, atraumatic  Eyes: clear sclera  ENMT: mucous membranes moist  Cardio: Regular rate and rhythm  Resp: CTA bilaterally, good respiratory effort  Gastrointestinal: Soft, nontender, nondistended  Musculoskeletal: ROM intact, no joint swelling  Extremities: 1+ BLE edema, no cyanosis, or clubbing  Neuro: alert and oriented x 3  Psychological: Appropriate mood and behavior    Relevant Results  Scheduled medications  amiodarone, 200 mg, oral, BID  apixaban, 2.5 mg, oral, BID  carvedilol, 25 mg, oral, BID  furosemide, 20 mg, oral, Daily  furosemide, 40 mg, oral, q AM  insulin glargine, 15 Units, subcutaneous, Nightly  insulin lispro, 0-10 Units, subcutaneous, TID AC  levothyroxine, 75 mcg, oral, Daily  lisinopril, 10 mg, oral, Daily  pantoprazole, 40 mg, oral, Daily before breakfast  polyethylene glycol, 17 g, oral, Daily  rosuvastatin, 10 mg, oral, Nightly  spironolactone, 25 mg, oral, q24h VENKATESH  tamsulosin, 0.4 mg, oral, Nightly      Continuous medications  oxygen, , Last Rate: 2 L/min (03/26/25 1236)      PRN medications  PRN medications: acetaminophen **OR** acetaminophen **OR**  acetaminophen, albuterol, dextrose, dextrose, glucagon, glucagon, melatonin, ondansetron **OR** ondansetron, oxyCODONE-acetaminophen    Results for orders placed or performed during the hospital encounter of 03/26/25 (from the past 24 hours)   POCT GLUCOSE   Result Value Ref Range    POCT Glucose 221 (H) 74 - 99 mg/dL   CBC   Result Value Ref Range    WBC 4.5 4.4 - 11.3 x10*3/uL    nRBC 0.0 0.0 - 0.0 /100 WBCs    RBC 5.14 4.50 - 5.90 x10*6/uL    Hemoglobin 14.4 13.5 - 17.5 g/dL    Hematocrit 46.0 41.0 - 52.0 %    MCV 90 80 - 100 fL    MCH 28.0 26.0 - 34.0 pg    MCHC 31.3 (L) 32.0 - 36.0 g/dL    RDW 19.6 (H) 11.5 - 14.5 %    Platelets 144 (L) 150 - 450 x10*3/uL   Basic metabolic panel   Result Value Ref Range    Glucose 92 74 - 99 mg/dL    Sodium 141 136 - 145 mmol/L    Potassium 3.5 3.5 - 5.3 mmol/L    Chloride 100 98 - 107 mmol/L    Bicarbonate 31 21 - 32 mmol/L    Anion Gap 14 10 - 20 mmol/L    Urea Nitrogen 29 (H) 6 - 23 mg/dL    Creatinine 1.79 (H) 0.50 - 1.30 mg/dL    eGFR 36 (L) >60 mL/min/1.73m*2    Calcium 8.1 (L) 8.6 - 10.3 mg/dL   Magnesium   Result Value Ref Range    Magnesium 2.22 1.60 - 2.40 mg/dL   ECG 12 Lead   Result Value Ref Range    Ventricular Rate 73 BPM    Atrial Rate 46 BPM    QRS Duration 184 ms    QT Interval 518 ms    QTC Calculation(Bazett) 570 ms    R Axis 202 degrees    T Axis 33 degrees    QRS Count 12 beats    Q Onset 186 ms    T Offset 445 ms    QTC Fredericia 553 ms   Cardioversion External   Result Value Ref Range    BSA 2.01 m2   ECG 12 lead   Result Value Ref Range    Ventricular Rate 70 BPM    Atrial Rate 72 BPM    QRS Duration 194 ms    QT Interval 536 ms    QTC Calculation(Bazett) 578 ms    R Axis 209 degrees    T Axis 51 degrees    QRS Count 12 beats    Q Onset 182 ms    T Offset 450 ms    QTC Fredericia 564 ms   POCT GLUCOSE   Result Value Ref Range    POCT Glucose 150 (H) 74 - 99 mg/dL   POCT GLUCOSE   Result Value Ref Range    POCT Glucose 279 (H) 74 - 99 mg/dL        Assessment & Plan    88yo CM with PMH of combined HF/NICM s/p ICD placement, CKD-III, DM-type II, GERD, and hypothyroidism that presented to the ED with chest pressure and was admitted for same reason    Chest pressure  - in setting of extensive cardiac history of combined HF, NICM, Vtach, Afib on eliquis  - likely secondary to 100% Afib on device interrogation  - s/p ablation this morning successful in NSR now  - continue home eliquis, amiodarone, coreg, lisinopril, crestor, and spironolactone  - continue telemetry    DM-type II  - HbA1c 7.7 most recently  - continue ISS and lantus    CKD-III  - appears at baseline    Hypothyroidism  - continue home levothyroxine    GERD  - continue PPI    DVT Proph: eliquis    Dispo: Patient requires close inpatient monitoring in setting of Afib requiring ablation, likely discharge tomorrow after monitoring overnight.         Neda Ramirez, DO

## 2025-03-28 NOTE — PROCEDURES
Procedure narrative:  The risks, benefits, and alternatives to the procedure and sedation were explained to the patient, and informed consent was obtained. The patient was in the fasting state. A grounding pad was placed. Self-adhesive anterior-posterior defibrillation pads were applied. A defibrillator was used for monitoring and the defibrillator waveform was set to biphasic. The patient was set up for continuous monitoring of surface 12 lead ECG, capnography, and pulse oximetry. Blood pressure was monitored with automatic cuff measurements. The procedure was performed under IV conscious sedation supplemented with intermittent deep sedation delivered by anaesthesia.    When pt had been adequately sedated, they were cardioverted with a 200J biphasic shock.  This restored sinus rhythm which was confirmed by tele and 12-lead ECG.       Summary:  Patient was successfully cardioverted from Atrial fibrillation to AP- rhythm confirmed by BIVICD interrogation    Complications:  Patient tolerated the procedure well with no complications noted.      Patient Instructions:  - No Driving or making legal decisions for 24 hours  - DO NOT Stop your blood thinner unless emergency without checking in with your doctor     Follow up:   DO NOT Stop your blood thinner unless emergency without checking in with your doctor.  No driving, alcohol or making legal decisions for 24 hours.  Plan for follow up with patient's cardiologist/electrophysiologist as scheduled

## 2025-03-28 NOTE — ANESTHESIA PREPROCEDURE EVALUATION
Patient: Valentin Rojas    Procedure Information       Date/Time: 03/28/25 0745    Scheduled providers: Nicole Butler MD    Procedure: CARDIOVERSION EXTERNAL    Location: [facility]            Relevant Problems   Anesthesia (within normal limits)      Cardiac   (+) CHB (complete heart block)   (+) Chest pain   (+) Chest pain, unspecified type   (+) Hyperlipidemia   (+) Pacemaker   (+) Primary hypertension   (+) ST elevation myocardial infarction (STEMI) (Multi)   (+) SVT (supraventricular tachycardia) (CMS-HCC)      Pulmonary   (+) Pneumonia due to infectious organism      Neuro (within normal limits)      GI (within normal limits)      /Renal (within normal limits)      Endocrine   (+) Diabetes mellitus, type 2 (Multi)   (+) Hypothyroidism, acquired      Hematology   (+) Anticoagulant long-term use      Musculoskeletal   (+) Primary osteoarthritis of left knee      ID   (+) Pneumonia due to infectious organism      Skin (within normal limits)      GYN (within normal limits)       Clinical information reviewed:    Allergies  Meds               NPO Detail:  No data recorded     Physical Exam    Airway  Mallampati: I  Neck ROM: full     Cardiovascular - normal exam  Rhythm: irregular  Rate: normal     Dental   (+) upper dentures, lower dentures     Pulmonary    Abdominal        Anesthesia Plan    History of general anesthesia?: yes  History of complications of general anesthesia?: no    ASA 3     general     The patient is not a current smoker.  Patient was not previously instructed to abstain from smoking on day of procedure.  Patient did not smoke on day of procedure.    Anesthetic plan and risks discussed with patient.  Use of blood products discussed with patient who consented to blood products.    Plan discussed with attending.

## 2025-03-28 NOTE — PROGRESS NOTES
Valentin Rojas is a 89 y.o. male on day 1 of admission presenting with Chest pressure.    Subjective   No pain to right great toe.  Seen today in waiting area for cardioversion.    Meds:  Scheduled medications  amiodarone, 200 mg, oral, BID  apixaban, 2.5 mg, oral, BID  carvedilol, 25 mg, oral, BID  furosemide, 20 mg, oral, Daily  furosemide, 40 mg, oral, q AM  insulin glargine, 15 Units, subcutaneous, Nightly  insulin lispro, 0-10 Units, subcutaneous, TID AC  levothyroxine, 75 mcg, oral, Daily  lisinopril, 10 mg, oral, Daily  pantoprazole, 40 mg, oral, Daily before breakfast   Or  pantoprazole, 40 mg, intravenous, Daily before breakfast  polyethylene glycol, 17 g, oral, Daily  rosuvastatin, 10 mg, oral, Nightly  spironolactone, 25 mg, oral, q24h VENKATESH  tamsulosin, 0.4 mg, oral, Nightly      Continuous medications  oxygen, , Last Rate: 2 L/min (03/26/25 1236)      PRN medications  PRN medications: acetaminophen **OR** acetaminophen **OR** acetaminophen, albuterol, dextrose, dextrose, glucagon, glucagon, melatonin, ondansetron **OR** ondansetron, oxyCODONE-acetaminophen       Physical Exam   Gen: NAD  Eyes:  EOM intact  ENT: MMM  Neck: No JVD  Respiratory: Diminished but no obvious wheezing  Cardiac: RRR, no murmurs rubs or gallops  Abdomen: soft, NT, +BS  Extremities: 2+ bilateral LE edema;  palpable pedal pulses;  absent left hallux nail with scab present;  absent right hallux nail with granular base;  no purulence or bleeding.  superficial ulceration to left anterior ankle with granular base.  No deeper structures visualized.  Neuro: No focal deficits, alert and oriented x 3  Psych:  appropriate mood and behavior.       Last Recorded Vitals  Blood pressure 118/82, pulse 70, temperature 36.9 °C (98.4 °F), temperature source Temporal, resp. rate 16, height 1.829 m (6'), weight 79.4 kg (175 lb), SpO2 95%.    Intake/Output last 3 Shifts:  I/O last 3 completed shifts:  In: 780 (9.8 mL/kg) [P.O.:780]  Out: 3530 (44.5  mL/kg) [Urine:3530 (1.2 mL/kg/hr)]  Weight: 79.4 kg     Relevant Results           Assessment/Plan   Assessment & Plan  Chest pressure    Chest pain, unspecified type    Diabetes mellitus, type 2 (Multi)    Pacemaker    Anticoagulant long-term use    Right hallux nail infection  Dressing removed.  Much improved.  Dressed changed with xeroform and mepilex.   no antibiotics needed.  Left ankle ulceration  Xeroform and coversite.  Ok or discharge from podiatry standpoint once cleared by medical team.  F/U in wound care center next week.       LENY MurrayM

## 2025-03-28 NOTE — H&P
History Of Present Illness:    Valentin Rojas is a 89 y.o. male presenting with atrial fibrillation on device check 100%.     Last Recorded Vitals:  Vitals:    03/27/25 1552 03/27/25 1913 03/27/25 2007 03/28/25 0355   BP: 116/73 124/80  125/79   BP Location: Right arm      Patient Position: Lying      Pulse: 70 73 71 69   Resp: 16 20 18 16   Temp: 36.7 °C (98.1 °F) 36.8 °C (98.2 °F)  36.9 °C (98.4 °F)   TempSrc: Temporal   Temporal   SpO2: 92% 93% 90% 95%   Weight:       Height:           Last Labs:  CBC - 3/28/2025:  6:29 AM  4.5 14.4 144    46.0      CMP - 3/28/2025:  6:29 AM  8.1 7.3 25 --- 1.7   3.3 4.1 16 145      PTT - No results in last year.  _   _ _     Troponin I, High Sensitivity   Date/Time Value Ref Range Status   03/26/2025 01:41 PM 23 (H) 0 - 20 ng/L Final   03/26/2025 12:38 PM 27 (H) 0 - 20 ng/L Final   01/12/2025 10:48 PM 59 (HH) 0 - 20 ng/L Final     Comment:     Previous result verified on 1/12/2025 2156 on specimen/case [id number] called with component [facility] for procedure Troponin I, High Sensitivity, Initial with value 66 ng/L.     BNP   Date/Time Value Ref Range Status   03/26/2025 12:38 PM 1,154 (H) 0 - 99 pg/mL Final   01/12/2025 09:22 PM 1,216 (H) 0 - 99 pg/mL Final     Hemoglobin A1C   Date/Time Value Ref Range Status   08/29/2024 06:58 PM 7.7 (H) 4.7 - 6.4 % Final     Comment:     Interpretation:     Standardized A1c  Good control or normal:  4-6% ( mg/dL avg)  Moderate control:        6.1-8.0% (120-180 mg/dL avg)  Poor control:            >8.0% (180 mg/dL avg)  With 4% as a baseline, each 1% increase = 30 mg/dL increase in average   glucose.  Taken from DCCT (Diabetes Control Complications Trial)   08/20/2024 09:31 AM 8.1 (H) 4.7 - 6.4 % Final     Comment:     Interpretation:     Standardized A1c  Good control or normal:  4-6% ( mg/dL avg)  Moderate control:        6.1-8.0% (120-180 mg/dL avg)  Poor control:            >8.0% (180 mg/dL avg)  With 4% as a baseline, each  1% increase = 30 mg/dL increase in average   glucose.  Taken from DCCT (Diabetes Control Complications Trial)      Last I/O:  I/O last 3 completed shifts:  In: 780 (9.8 mL/kg) [P.O.:780]  Out: 3530 (44.5 mL/kg) [Urine:3530 (1.2 mL/kg/hr)]  Weight: 79.4 kg     Past Cardiology Tests (Last 3 Years):  EKG:  ECG 12 lead 03/26/2025 (Preliminary)      ECG 12 lead 03/26/2025      ECG 12 lead 01/15/2025      ECG 12 Lead 01/13/2025      ECG 12 lead 01/12/2025    Echo:  Transthoracic Echo (TTE) Complete 01/13/2025    Ejection Fractions:  EF   Date/Time Value Ref Range Status   01/13/2025 09:30 AM 23 %      Cath:  No results found for this or any previous visit from the past 1095 days.    Stress Test:  Nuclear Stress Test 08/30/2024    Cardiac Imaging:  No results found for this or any previous visit from the past 1095 days.      Past Medical History:  He has no past medical history on file.    Past Surgical History:  He has no past surgical history on file.      Social History:  He reports that he has never smoked. He has never been exposed to tobacco smoke. He has never used smokeless tobacco. No history on file for alcohol use and drug use.    Family History:  No family history on file.     Allergies:  Penicillins    Inpatient Medications:  Scheduled medications   Medication Dose Route Frequency    amiodarone  200 mg oral BID    apixaban  2.5 mg oral BID    carvedilol  25 mg oral BID    furosemide  20 mg oral Daily    furosemide  40 mg oral q AM    insulin glargine  15 Units subcutaneous Nightly    insulin lispro  0-10 Units subcutaneous TID AC    levothyroxine  75 mcg oral Daily    lisinopril  10 mg oral Daily    pantoprazole  40 mg oral Daily before breakfast    Or    pantoprazole  40 mg intravenous Daily before breakfast    polyethylene glycol  17 g oral Daily    rosuvastatin  10 mg oral Nightly    spironolactone  25 mg oral q24h VENKATESH    tamsulosin  0.4 mg oral Nightly     PRN medications   Medication    acetaminophen     Or    acetaminophen    Or    acetaminophen    albuterol    dextrose    dextrose    glucagon    glucagon    melatonin    ondansetron    Or    ondansetron    oxyCODONE-acetaminophen     Continuous Medications   Medication Dose Last Rate    oxygen   2 L/min (03/26/25 1236)     Outpatient Medications:  Current Outpatient Medications   Medication Instructions    amiodarone (Pacerone) 400 mg tablet Take 1 tablet (400 mg) by mouth 2 times a day for 10 days, THEN 1 tablet (400 mg) once daily.    apixaban (ELIQUIS) 2.5 mg, oral, 2 times daily    carvedilol (COREG) 25 mg, 2 times daily    empagliflozin (JARDIANCE) 25 mg, Daily    furosemide (LASIX) 40 mg, oral, Every morning, Pt takes 40 mg in the morning and then additional 20 mg mid morning for a daily total of 60 mg.     furosemide (LASIX) 20 mg, oral, Daily, Pt takes 40 mg in the morning and then additional 20 mg mid morning for a daily total of 60 mg.     glipiZIDE XL (GLUCOTROL XL) 5 mg, Daily    lansoprazole (PREVACID) 15 mg, Daily PRN    Lantus U-100 Insulin 22 Units, subcutaneous, Nightly, Take as directed per insulin instructions.   Pt states that he was originally prescribed 22 units at bedtime, but adjust based on his blood glucose level anywhere from 15 units to 22 units subcutaneously at bedtime.    levothyroxine (SYNTHROID, LEVOXYL) 75 mcg, Daily RT    lisinopril 10 mg, Daily    oxyCODONE-acetaminophen (Percocet) 5-325 mg tablet 1 tablet, oral, Every 8 hours PRN, Pt states he takes 1 to 2 tablets by mouth daily as needed for pain    POTASSIUM CHLORIDE ORAL 99 mg, oral, Daily    rosuvastatin (CRESTOR) 10 mg, Daily    spironolactone (ALDACTONE) 25 mg, oral, Every 24 hours scheduled    tamsulosin (FLOMAX) 0.4 mg, Nightly       Physical Exam:  rrr     Assessment/Plan   Proceed with cardioversion  Peripheral IV 03/26/25 20 G Left Antecubital (Active)   Site Assessment Clean;Dry;Intact 03/27/25 2030   Dressing Type Transparent 03/27/25 2030   Line Status Saline  locked 03/27/25 2030   Dressing Status Clean;Dry 03/27/25 2030   Number of days: 2       Code Status:  Full Code    I spent  minutes in the professional and overall care of this patient.        Nicole Butler MD

## 2025-03-28 NOTE — CARE PLAN
The patient's goals for the shift include  remain comfortable    The clinical goals for the shift include remain in sinus rhythm  .    Problem: Respiratory  Goal: Clear secretions with interventions this shift  Outcome: Progressing     Problem: Respiratory  Goal: No signs of respiratory distress (eg. Use of accessory muscles. Peds grunting)  Outcome: Progressing     Problem: Respiratory  Goal: Verbalize decreased shortness of breath this shift  Outcome: Progressing

## 2025-03-29 VITALS
RESPIRATION RATE: 14 BRPM | BODY MASS INDEX: 22.82 KG/M2 | OXYGEN SATURATION: 93 % | TEMPERATURE: 98.4 F | WEIGHT: 168.5 LBS | SYSTOLIC BLOOD PRESSURE: 110 MMHG | HEART RATE: 70 BPM | HEIGHT: 72 IN | DIASTOLIC BLOOD PRESSURE: 70 MMHG

## 2025-03-29 LAB
ANION GAP SERPL CALC-SCNC: 10 MMOL/L (ref 10–20)
BUN SERPL-MCNC: 27 MG/DL (ref 6–23)
CALCIUM SERPL-MCNC: 8.1 MG/DL (ref 8.6–10.3)
CHLORIDE SERPL-SCNC: 99 MMOL/L (ref 98–107)
CO2 SERPL-SCNC: 35 MMOL/L (ref 21–32)
CREAT SERPL-MCNC: 1.9 MG/DL (ref 0.5–1.3)
EGFRCR SERPLBLD CKD-EPI 2021: 34 ML/MIN/1.73M*2
ERYTHROCYTE [DISTWIDTH] IN BLOOD BY AUTOMATED COUNT: 19.9 % (ref 11.5–14.5)
GLUCOSE BLD MANUAL STRIP-MCNC: 113 MG/DL (ref 74–99)
GLUCOSE BLD MANUAL STRIP-MCNC: 200 MG/DL (ref 74–99)
GLUCOSE BLD MANUAL STRIP-MCNC: 65 MG/DL (ref 74–99)
GLUCOSE SERPL-MCNC: 101 MG/DL (ref 74–99)
HCT VFR BLD AUTO: 45.3 % (ref 41–52)
HGB BLD-MCNC: 14.2 G/DL (ref 13.5–17.5)
MAGNESIUM SERPL-MCNC: 2.25 MG/DL (ref 1.6–2.4)
MCH RBC QN AUTO: 28 PG (ref 26–34)
MCHC RBC AUTO-ENTMCNC: 31.3 G/DL (ref 32–36)
MCV RBC AUTO: 89 FL (ref 80–100)
NRBC BLD-RTO: 0 /100 WBCS (ref 0–0)
PLATELET # BLD AUTO: 145 X10*3/UL (ref 150–450)
POTASSIUM SERPL-SCNC: 3.5 MMOL/L (ref 3.5–5.3)
RBC # BLD AUTO: 5.07 X10*6/UL (ref 4.5–5.9)
SODIUM SERPL-SCNC: 140 MMOL/L (ref 136–145)
WBC # BLD AUTO: 5.2 X10*3/UL (ref 4.4–11.3)

## 2025-03-29 PROCEDURE — 36415 COLL VENOUS BLD VENIPUNCTURE: CPT | Performed by: PHYSICIAN ASSISTANT

## 2025-03-29 PROCEDURE — 2500000001 HC RX 250 WO HCPCS SELF ADMINISTERED DRUGS (ALT 637 FOR MEDICARE OP): Performed by: PHYSICIAN ASSISTANT

## 2025-03-29 PROCEDURE — 80048 BASIC METABOLIC PNL TOTAL CA: CPT | Performed by: PHYSICIAN ASSISTANT

## 2025-03-29 PROCEDURE — 83735 ASSAY OF MAGNESIUM: CPT | Performed by: PHYSICIAN ASSISTANT

## 2025-03-29 PROCEDURE — 99239 HOSP IP/OBS DSCHRG MGMT >30: CPT | Performed by: FAMILY MEDICINE

## 2025-03-29 PROCEDURE — 94760 N-INVAS EAR/PLS OXIMETRY 1: CPT

## 2025-03-29 PROCEDURE — 85027 COMPLETE CBC AUTOMATED: CPT | Performed by: PHYSICIAN ASSISTANT

## 2025-03-29 PROCEDURE — 2500000005 HC RX 250 GENERAL PHARMACY W/O HCPCS: Performed by: PHYSICIAN ASSISTANT

## 2025-03-29 PROCEDURE — 9420000001 HC RT PATIENT EDUCATION 5 MIN

## 2025-03-29 PROCEDURE — 82947 ASSAY GLUCOSE BLOOD QUANT: CPT

## 2025-03-29 PROCEDURE — 2500000002 HC RX 250 W HCPCS SELF ADMINISTERED DRUGS (ALT 637 FOR MEDICARE OP, ALT 636 FOR OP/ED): Performed by: PHYSICIAN ASSISTANT

## 2025-03-29 RX ORDER — FUROSEMIDE 20 MG/1
20 TABLET ORAL NIGHTLY
Start: 2025-03-29

## 2025-03-29 RX ORDER — FUROSEMIDE 20 MG/1
20 TABLET ORAL NIGHTLY
Status: DISCONTINUED | OUTPATIENT
Start: 2025-03-29 | End: 2025-03-29 | Stop reason: HOSPADM

## 2025-03-29 RX ORDER — AMIODARONE HYDROCHLORIDE 200 MG/1
200 TABLET ORAL 2 TIMES DAILY
Qty: 60 TABLET | Refills: 0 | Status: SHIPPED | OUTPATIENT
Start: 2025-03-29 | End: 2025-04-28

## 2025-03-29 RX ORDER — FUROSEMIDE 20 MG/1
40 TABLET ORAL EVERY MORNING
Start: 2025-03-29

## 2025-03-29 RX ORDER — SPIRONOLACTONE 25 MG/1
25 TABLET ORAL
Qty: 30 TABLET | Refills: 0 | Status: SHIPPED | OUTPATIENT
Start: 2025-03-29 | End: 2025-04-28

## 2025-03-29 RX ORDER — PANTOPRAZOLE SODIUM 40 MG/1
40 TABLET, DELAYED RELEASE ORAL
Qty: 30 TABLET | Refills: 0 | Status: SHIPPED | OUTPATIENT
Start: 2025-03-30 | End: 2025-04-29

## 2025-03-29 RX ADMIN — LEVOTHYROXINE SODIUM 75 MCG: 0.07 TABLET ORAL at 05:17

## 2025-03-29 RX ADMIN — APIXABAN 2.5 MG: 2.5 TABLET, FILM COATED ORAL at 08:57

## 2025-03-29 RX ADMIN — DEXTROSE MONOHYDRATE 12.5 G: 25 INJECTION, SOLUTION INTRAVENOUS at 06:12

## 2025-03-29 RX ADMIN — AMIODARONE HYDROCHLORIDE 200 MG: 200 TABLET ORAL at 08:58

## 2025-03-29 RX ADMIN — Medication 21 PERCENT: at 09:43

## 2025-03-29 RX ADMIN — SPIRONOLACTONE 25 MG: 25 TABLET ORAL at 08:58

## 2025-03-29 RX ADMIN — CARVEDILOL 25 MG: 25 TABLET, FILM COATED ORAL at 08:57

## 2025-03-29 RX ADMIN — PANTOPRAZOLE SODIUM 40 MG: 40 TABLET, DELAYED RELEASE ORAL at 05:16

## 2025-03-29 RX ADMIN — LISINOPRIL 10 MG: 10 TABLET ORAL at 08:58

## 2025-03-29 RX ADMIN — FUROSEMIDE 40 MG: 40 TABLET ORAL at 05:16

## 2025-03-29 RX ADMIN — OXYCODONE HYDROCHLORIDE AND ACETAMINOPHEN 1 TABLET: 5; 325 TABLET ORAL at 05:16

## 2025-03-29 RX ADMIN — INSULIN LISPRO 2 UNITS: 100 INJECTION, SOLUTION INTRAVENOUS; SUBCUTANEOUS at 11:21

## 2025-03-29 ASSESSMENT — COGNITIVE AND FUNCTIONAL STATUS - GENERAL
MOBILITY SCORE: 20
WALKING IN HOSPITAL ROOM: A LITTLE
STANDING UP FROM CHAIR USING ARMS: A LITTLE
HELP NEEDED FOR BATHING: A LITTLE
DRESSING REGULAR LOWER BODY CLOTHING: A LITTLE
DRESSING REGULAR UPPER BODY CLOTHING: A LITTLE
TOILETING: A LITTLE
CLIMB 3 TO 5 STEPS WITH RAILING: A LITTLE
MOVING TO AND FROM BED TO CHAIR: A LITTLE
DAILY ACTIVITIY SCORE: 20

## 2025-03-29 ASSESSMENT — PAIN - FUNCTIONAL ASSESSMENT: PAIN_FUNCTIONAL_ASSESSMENT: 0-10

## 2025-03-29 ASSESSMENT — PAIN SCALES - WONG BAKER: WONGBAKER_NUMERICALRESPONSE: NO HURT

## 2025-03-29 ASSESSMENT — PAIN SCALES - GENERAL
PAINLEVEL_OUTOF10: 3
PAINLEVEL_OUTOF10: 7
PAINLEVEL_OUTOF10: 0 - NO PAIN

## 2025-03-29 NOTE — SIGNIFICANT EVENT
RT came to instruct patient on IS, he refused to take instructions and stated he would do it in the morning. Will make day shift aware of the situation.

## 2025-03-29 NOTE — PROGRESS NOTES
03/29/25 0951   Discharge Planning   Living Arrangements Spouse/significant other   Support Systems Spouse/significant other;Family members;Friends/neighbors   Assistance Needed Alert and oriented x 4, Independent with ADL's, Drives; Cane and walker used at home, Room air baseline, on room air here , PCP Dr Puja Barrera; on Eliquis prior to hospitalization   Type of Residence Private residence   Number of Stairs to Enter Residence 4   Number of Stairs Within Residence 0   Do you have animals or pets at home? No   Who is requesting discharge planning? Provider   Home or Post Acute Services None   Expected Discharge Disposition Home  (Patient and spouse both denying discharge needs at this time.)   Stroke Family Assessment   Stroke Family Assessment Needed No   Intensity of Service   Intensity of Service 0-30 min

## 2025-03-29 NOTE — DISCHARGE SUMMARY
Discharge Diagnosis  Chest pressure- Afib    Issues Requiring Follow-Up  Cardiology follow up as scheduled for CHF and Afib s/p ablation  PCP follow up after hospitalization  Wound care follow up in one week    Discharge Meds     Medication List      START taking these medications     pantoprazole 40 mg EC tablet; Commonly known as: ProtoNix; Take 1 tablet   (40 mg) by mouth once daily in the morning. Take before meals. Do not   crush, chew, or split.; Start taking on: March 30, 2025     CHANGE how you take these medications     amiodarone 200 mg tablet; Commonly known as: Pacerone; Take 1 tablet   (200 mg) by mouth 2 times a day.; What changed: medication strength, See   the new instructions.   * furosemide 20 mg tablet; Commonly known as: Lasix; Take 2 tablets (40   mg) by mouth once daily in the morning. Pt takes 40 mg in the morning and   then additional 20 mg AT NIGHT for a daily total of 60 mg.; What changed:   additional instructions   * furosemide 20 mg tablet; Commonly known as: Lasix; Take 1 tablet (20   mg) by mouth once daily at bedtime. Pt takes 40 mg in the morning and then   additional 20 mg AT NIGHT for a daily total of 60 mg.; What changed: when   to take this, additional instructions  * This list has 2 medication(s) that are the same as other medications   prescribed for you. Read the directions carefully, and ask your doctor or   other care provider to review them with you.     CONTINUE taking these medications     apixaban 2.5 mg tablet; Commonly known as: Eliquis; Take 1 tablet (2.5   mg) by mouth 2 times a day.   carvedilol 25 mg tablet; Commonly known as: Coreg   glipiZIDE XL 5 mg 24 hr tablet; Commonly known as: Glucotrol XL   Jardiance 25 mg tablet; Generic drug: empagliflozin   lansoprazole 15 mg DR capsule; Commonly known as: Prevacid   Lantus U-100 Insulin 100 unit/mL injection; Generic drug: insulin   glargine   levothyroxine 75 mcg tablet; Commonly known as: Synthroid, Levoxyl    lisinopril 10 mg tablet   oxyCODONE-acetaminophen 5-325 mg tablet; Commonly known as: Percocet   POTASSIUM CHLORIDE ORAL   spironolactone 25 mg tablet; Commonly known as: Aldactone; Take 1 tablet   (25 mg) by mouth once every 24 hours.   tamsulosin 0.4 mg 24 hr capsule; Commonly known as: Flomax     ASK your doctor about these medications     rosuvastatin 10 mg tablet; Commonly known as: Crestor       Test Results Pending At Discharge  Pending Labs       No current pending labs.            Hospital Course  88yo CM with PMH of combined HF/NICM s/p ICD placement, CKD-III, DM-type II, GERD, and hypothyroidism that presented to the ED with chest pressure and was admitted for same reason. Patient was seen by podiatry for toe wounds that were not infected but need outpatient wound care follow up. Patient was seen by cardiology and treated with IV diuresis and then underwent ablation on 3/28. Patient had single episode of asymptomatic possible Vtach overnight on telemetry but ICD did not fire so unclear, cardiology reviewed and cleared patient for discharge as he has close EP follow up scheduled already. Patient and wife were instructed that should he have any palpations or similar symptoms to call office for sooner visit and return to ED. Patient and wife expressed understanding and are agreeable to plan. Patient appears medically stable for discharge.    Medically cleared for discharge. Medications reviewed and reconciled. Scripts prepared as needed.  Discussed with the family, , and . Questions answered. Understanding verbalized. Overall time spent on discharge was longer than 35 min.      Pertinent Physical Exam At Time of Discharge  Constitutional: alert and oriented x 3, awake, cooperative, no acute distress  Skin: warm and dry, bilateral great toe dressings in place  Head/Neck: Normocephalic, atraumatic  Eyes: clear sclera  ENMT: mucous membranes moist  Cardio: Regular rate and rhythm  Resp:  CTA bilaterally, good respiratory effort  Gastrointestinal: Soft, nontender, nondistended  Musculoskeletal: ROM intact, no joint swelling  Extremities: 1+ BLE edema, no cyanosis, or clubbing  Neuro: alert and oriented x 3  Psychological: Appropriate mood and behavior      Outpatient Follow-Up  Future Appointments   Date Time Provider Department Center   4/14/2025  2:20 PM MD FENG Lowry1 [facility]   4/15/2025  2:20 PM Nicole Butler MD [address]         Neda Ramirez DO

## 2025-03-29 NOTE — CARE PLAN
Problem: Respiratory  Goal: Clear secretions with interventions this shift  Outcome: Progressing  Goal: Minimize anxiety/maximize coping throughout shift  Outcome: Progressing  Goal: Minimal/no exertional discomfort or dyspnea this shift  Outcome: Progressing  Goal: No signs of respiratory distress (eg. Use of accessory muscles. Peds grunting)  Outcome: Progressing  Goal: Patent airway maintained this shift  Outcome: Progressing  Goal: Tolerate mechanical ventilation evidenced by VS/agitation level this shift  Outcome: Progressing  Goal: Tolerate pulmonary toileting this shift  Outcome: Progressing  Goal: Verbalize decreased shortness of breath this shift  Outcome: Progressing  Goal: Wean oxygen to maintain O2 saturation per order/standard this shift  Outcome: Progressing  Goal: Increase self care and/or family involvement in next 24 hours  Outcome: Progressing   The patient's goals for the shift include  to be discharged home    The clinical goals for the shift include pt will remain stable and free from pain     Over the shift, the patient did not make progress toward the following goals. Barriers to progression include none. Recommendations to address these barriers include none.

## 2025-03-29 NOTE — SIGNIFICANT EVENT
56 beat runs of V. tach overnight.  He was asymptomatic.  He refused EKG. /70   Pulse 70   Temp 36.9 °C (98.4 °F) (Temporal)   Resp 14   Ht 1.829 m (6')   Wt 76.4 kg (168 lb 8 oz)   SpO2 93%   BMI 22.85 kg/m² .  Cardiology is aware.  Continue current management.

## 2025-04-01 NOTE — SIGNIFICANT EVENT
Follow Up Phone Call    Outgoing phone call    Spoke to: Valentin Rojas Relationship:self   Phone number: [phone]      Outcome: contacted patient/ family   Chief Complaint   Patient presents with    Shortness of Breath     Pt BIBS from home with c/o chest pressure and shortness of breath. Symptoms began yesterday afternoon, was intermittent through out the night. This morning pt was on his way to a doctors appointment and had an increase in shortness of breath. Squad states pt was satting at 90% on room air, was placed on 4L NC. Hx of CHF          Diagnosis:Not applicable    States he is feeling better. No further questions or concerns.

## 2025-04-09 LAB
ATRIAL RATE: 46 BPM
ATRIAL RATE: 72 BPM
Q ONSET: 182 MS
Q ONSET: 186 MS
QRS COUNT: 12 BEATS
QRS COUNT: 12 BEATS
QRS DURATION: 184 MS
QRS DURATION: 194 MS
QT INTERVAL: 518 MS
QT INTERVAL: 536 MS
QTC CALCULATION(BAZETT): 570 MS
QTC CALCULATION(BAZETT): 578 MS
QTC FREDERICIA: 553 MS
QTC FREDERICIA: 564 MS
R AXIS: 202 DEGREES
R AXIS: 209 DEGREES
T AXIS: 33 DEGREES
T AXIS: 51 DEGREES
T OFFSET: 445 MS
T OFFSET: 450 MS
VENTRICULAR RATE: 70 BPM
VENTRICULAR RATE: 73 BPM

## 2025-04-11 NOTE — PROGRESS NOTES
[facility] Vascular Electrophysiology    Patient Name: Valentin Rojas  Patient : 1935    Referred for  pAfib    History of Present Illness:  Valentin Rojas is a 89 y.o. year old male patient with:    pAfib: s/p DCCV 2025, 1 shock at 200 joules and patient was restored to a normal sinus rhythm. Pt is on Eliquis, amiodarone and carvedilol.  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        Past Medical History:  He has no past medical history on file.    Past Surgical History:  He has no past surgical history on file.      Social History:  He reports that he has never smoked. He has never been exposed to tobacco smoke. He has never used smokeless tobacco. No history on file for alcohol use and drug use.    Family History:  No family history on file.     Allergies:  Penicillins    Outpatient Medications:  Current Outpatient Medications   Medication Instructions    amiodarone (PACERONE) 200 mg, oral, 2 times daily    apixaban (ELIQUIS) 2.5 mg, oral, 2 times daily    carvedilol (COREG) 25 mg, 2 times daily    empagliflozin (JARDIANCE) 25 mg, Daily    furosemide (LASIX) 40 mg, oral, Every morning, Pt takes 40 mg in the morning and then additional 20 mg AT NIGHT for a daily total of 60 mg.    furosemide (LASIX) 20 mg, oral, Nightly, Pt takes 40 mg in the morning and then additional 20 mg AT NIGHT for a daily total of 60 mg.    glipiZIDE XL (GLUCOTROL XL) 5 mg, Daily    lansoprazole (PREVACID) 15 mg, Daily PRN    Lantus U-100 Insulin 22 Units, subcutaneous, Nightly, Take as directed per insulin instructions.   Pt states that he was originally prescribed 22 units at bedtime, but adjust based on his blood glucose level anywhere from 15 units to 22 units subcutaneously at bedtime.    levothyroxine (SYNTHROID, LEVOXYL) 75 mcg, Daily RT    lisinopril 10 mg, Daily     oxyCODONE-acetaminophen (Percocet) 5-325 mg tablet 1 tablet, oral, Every 8 hours PRN, Pt states he takes 1 to 2 tablets by mouth daily as needed for pain    pantoprazole (PROTONIX) 40 mg, oral, Daily before breakfast, Do not crush, chew, or split.    POTASSIUM CHLORIDE ORAL 99 mg, oral, Daily    rosuvastatin (CRESTOR) 10 mg, Daily    spironolactone (ALDACTONE) 25 mg, oral, Every 24 hours scheduled    tamsulosin (FLOMAX) 0.4 mg, Nightly        ROS:  A 14 point review of systems was done and is negative other than as stated in HPI    Physical Exam    Vitals:  There were no vitals taken for this visit.       Labs:   CBC  Lab Results   Component Value Date    WBC 5.2 03/29/2025    HGB 14.2 03/29/2025    HCT 45.3 03/29/2025    MCV 89 03/29/2025     (L) 03/29/2025        Renal Function Panel  Lab Results   Component Value Date    GLUCOSE 101 (H) 03/29/2025     03/29/2025    K 3.5 03/29/2025    CL 99 03/29/2025    CO2 35 (H) 03/29/2025    ANIONGAP 10 03/29/2025    BUN 27 (H) 03/29/2025    CREATININE 1.90 (H) 03/29/2025    CALCIUM 8.1 (L) 03/29/2025        CMP  Lab Results   Component Value Date    CALCIUM 8.1 (L) 03/29/2025    PHOS 3.3 01/15/2025    PROT 7.3 03/26/2025    ALBUMIN 4.1 03/26/2025    AST 25 03/26/2025    ALT 16 03/26/2025    ALKPHOS 145 (H) 03/26/2025    BILITOT 1.7 (H) 03/26/2025       TSH  Lab Results   Component Value Date    TSH 6.71 (H) 03/26/2025    FREET4 1.54 (H) 03/26/2025          Cardiac Testing:  ECG  04/14/2025      Echocardiogram  01/13/2025  PHYSICIAN INTERPRETATION:  Left Ventricle: The left ventricular systolic function is severely decreased, with a visually estimated ejection fraction of 20-25%. There is mild eccentric left ventricular hypertrophy. There is global hypokinesis of the left ventricle with minor regional variations. The left ventricular cavity size is moderately dilated. There is mildly increased septal and normal posterior left ventricular wall thickness. Left  ventricular diastolic filling cannot be determined, due to right ventricular pacing.  Left Atrium: The left atrium is severely dilated.  Right Ventricle: The right ventricle is severely enlarged. There is reduced right ventricular systolic function. A device is visualized in the right ventricle.  Right Atrium: The right atrium is moderately dilated. There is a device visualized in the right atrium.  Aortic Valve: The aortic valve is trileaflet. There is mild aortic valve cusp calcification. The aortic valve dimensionless index is 0.67. There is no evidence of aortic valve regurgitation. The peak instantaneous gradient of the aortic valve is 2 mmHg. The mean gradient of the aortic valve is 1 mmHg.  Mitral Valve: The mitral valve is mild to moderately thickened. There is mild to moderate mitral valve regurgitation.  Tricuspid Valve: The tricuspid valve is structurally normal. There is mild to moderate tricuspid regurgitation. The Doppler estimated RVSP is within normal limits at 23.2 mmHg.  Pulmonic Valve: The pulmonic valve is structurally normal. There is moderate pulmonic valve regurgitation.  Pericardium: There is no pericardial effusion noted.  Aorta: The aortic root is normal.        CONCLUSIONS:   1. The left ventricular systolic function is severely decreased, with a visually estimated ejection fraction of 20-25%.   2. There is global hypokinesis of the left ventricle with minor regional variations.   3. Left ventricular cavity size is moderately dilated.   4. There is reduced right ventricular systolic function.   5. Severely enlarged right ventricle.   6. The left atrium is severely dilated.   7. The right atrium is moderately dilated.   8. Mild to moderate mitral valve regurgitation.   9. Mild to moderate tricuspid regurgitation visualized.  10. Right ventricular systolic pressure is within normal limits.  11. There is moderate pulmonic valve regurgitation.      Nuclear Stress Test  08/30/2024  CONCLUSIONS:     1. SPECT Perfusion Study: Abnormal.    2. There is mild (<10%) ischemia in the territory of the LCX.    3. There is a small (<10%) fixed perfusion defect in the RCA territory.    4. Left ventricle is severely dilated. The left ventricle systolic   function is moderately decreased.    5. This is an intermediate risk scan.        LVEF % 34     FINDINGS:     LVEF: 34 %       LEFT VENTRICLE   The left ventricle is severely dilated. Left ventricular systolic   function is moderately decreased.       Assessment:       Plan:

## 2025-04-14 ENCOUNTER — APPOINTMENT (OUTPATIENT)
Dept: CARDIOLOGY | Facility: HOSPITAL | Age: 89
End: 2025-04-14
Payer: MEDICARE

## 2025-04-18 NOTE — PROGRESS NOTES
[facility] Heart and Vascular Electrophysiology    Patient Name: Valentin Rojas  Patient : 1935    Referred for  VT    History of Present Illness:  Valentin Rojas is a 89 y.o. 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  CHB: s/p CRT-D upgrade 2013 with generator change on 2018  Afib: s/p DCCV 10/2019, 2025 successful DCCV with 1 shock at 200 joules where sinus rhythm was restored.  CKD3  T2DM  HTN  HLD  GERD  BPH  Hypothyroidism  [facility] he was having runs of VT on the monitor.  Telemetry overnight showed NSVT.  Device interrogation revealed one episode of VT treated with ATP successfully, several episodes of NSVT, 100% afib burden.    Patient recently admitted with ADHF, underwent DC cardioversion. Comes in using a wheel chair, complains of significant fatigue and occasional lightheadedness. Denies SOB, CP, syncope. Denies device shocks. He is on amiodarone and GDMT, including carvedilol. His ECG today shows  rhythm, positive V1, QRS width is 194 ms, HR 71 bpm. Device interrogation shows afib, no recent VT. Device is programmed DDDR, 95% effective CRT pacing.     Past Medical History:  He has no past medical history on file.    Past Surgical History:  He has no past surgical history on file.      Social History:  He reports that he has never smoked. He has never been exposed to tobacco smoke. He has never used smokeless tobacco. No history on file for alcohol use and drug use.    Family History:  Family History[1]     Allergies:  Penicillins    Outpatient Medications:  Current Outpatient Medications   Medication Instructions    amiodarone (PACERONE) 200 mg, oral, 2 times daily    apixaban (ELIQUIS) 2.5 mg, oral, 2 times daily    carvedilol (COREG) 25 mg, 2 times daily    empagliflozin  (JARDIANCE) 25 mg, Daily    furosemide (LASIX) 40 mg, oral, Every morning, Pt takes 40 mg in the morning and then additional 20 mg AT NIGHT for a daily total of 60 mg.    furosemide (LASIX) 20 mg, oral, Nightly, Pt takes 40 mg in the morning and then additional 20 mg AT NIGHT for a daily total of 60 mg.    glipiZIDE XL (GLUCOTROL XL) 5 mg, Daily    lansoprazole (PREVACID) 15 mg, Daily PRN    Lantus U-100 Insulin 22 Units, subcutaneous, Nightly, Take as directed per insulin instructions.   Pt states that he was originally prescribed 22 units at bedtime, but adjust based on his blood glucose level anywhere from 15 units to 22 units subcutaneously at bedtime.    levothyroxine (SYNTHROID, LEVOXYL) 75 mcg, Daily RT    lisinopril 10 mg, Daily    oxyCODONE-acetaminophen (Percocet) 5-325 mg tablet 1 tablet, oral, Every 8 hours PRN, Pt states he takes 1 to 2 tablets by mouth daily as needed for pain    pantoprazole (PROTONIX) 40 mg, oral, Daily before breakfast, Do not crush, chew, or split.    POTASSIUM CHLORIDE ORAL 99 mg, oral, Daily    rosuvastatin (CRESTOR) 10 mg, Daily    spironolactone (ALDACTONE) 25 mg, oral, Every 24 hours scheduled    tamsulosin (FLOMAX) 0.4 mg, Nightly        ROS:  A 14 point review of systems was done and is negative other than as stated in HPI    Physical Exam  Cardiovascular:      Rate and Rhythm: Normal rate and regular rhythm.      Heart sounds: No murmur heard.     No friction rub. No gallop.   Pulmonary:      Effort: Pulmonary effort is normal.      Breath sounds: Normal breath sounds.   Abdominal:      Palpations: Abdomen is soft.   Musculoskeletal:      Cervical back: Neck supple.   Neurological:      Mental Status: He is alert.   Psychiatric:         Mood and Affect: Mood normal.         Behavior: Behavior normal.         Vitals:  There were no vitals taken for this visit.       Labs:   CBC  Lab Results   Component Value Date    WBC 5.2 03/29/2025    HGB 14.2 03/29/2025    HCT 45.3  03/29/2025    MCV 89 03/29/2025     (L) 03/29/2025        Renal Function Panel  Lab Results   Component Value Date    GLUCOSE 101 (H) 03/29/2025     03/29/2025    K 3.5 03/29/2025    CL 99 03/29/2025    CO2 35 (H) 03/29/2025    ANIONGAP 10 03/29/2025    BUN 27 (H) 03/29/2025    CREATININE 1.90 (H) 03/29/2025    CALCIUM 8.1 (L) 03/29/2025        CMP  Lab Results   Component Value Date    CALCIUM 8.1 (L) 03/29/2025    PHOS 3.3 01/15/2025    PROT 7.3 03/26/2025    ALBUMIN 4.1 03/26/2025    AST 25 03/26/2025    ALT 16 03/26/2025    ALKPHOS 145 (H) 03/26/2025    BILITOT 1.7 (H) 03/26/2025       TSH  Lab Results   Component Value Date    TSH 6.71 (H) 03/26/2025    FREET4 1.54 (H) 03/26/2025          Cardiac Testing:  ECG  04/21/2025   rhythm, positive V1, QRS width is 194 ms, HR 71 bpm.    Echocardiogram  01/13/2025  PHYSICIAN INTERPRETATION:  Left Ventricle: The left ventricular systolic function is severely decreased, with a visually estimated ejection fraction of 20-25%. There is mild eccentric left ventricular hypertrophy. There is global hypokinesis of the left ventricle with minor regional variations. The left ventricular cavity size is moderately dilated. There is mildly increased septal and normal posterior left ventricular wall thickness. Left ventricular diastolic filling cannot be determined, due to right ventricular pacing.  Left Atrium: The left atrium is severely dilated.  Right Ventricle: The right ventricle is severely enlarged. There is reduced right ventricular systolic function. A device is visualized in the right ventricle.  Right Atrium: The right atrium is moderately dilated. There is a device visualized in the right atrium.  Aortic Valve: The aortic valve is trileaflet. There is mild aortic valve cusp calcification. The aortic valve dimensionless index is 0.67. There is no evidence of aortic valve regurgitation. The peak instantaneous gradient of the aortic valve is 2 mmHg. The mean  gradient of the aortic valve is 1 mmHg.  Mitral Valve: The mitral valve is mild to moderately thickened. There is mild to moderate mitral valve regurgitation.  Tricuspid Valve: The tricuspid valve is structurally normal. There is mild to moderate tricuspid regurgitation. The Doppler estimated RVSP is within normal limits at 23.2 mmHg.  Pulmonic Valve: The pulmonic valve is structurally normal. There is moderate pulmonic valve regurgitation.  Pericardium: There is no pericardial effusion noted.  Aorta: The aortic root is normal.        CONCLUSIONS:   1. The left ventricular systolic function is severely decreased, with a visually estimated ejection fraction of 20-25%.   2. There is global hypokinesis of the left ventricle with minor regional variations.   3. Left ventricular cavity size is moderately dilated.   4. There is reduced right ventricular systolic function.   5. Severely enlarged right ventricle.   6. The left atrium is severely dilated.   7. The right atrium is moderately dilated.   8. Mild to moderate mitral valve regurgitation.   9. Mild to moderate tricuspid regurgitation visualized.  10. Right ventricular systolic pressure is within normal limits.  11. There is moderate pulmonic valve regurgitation.      Nuclear Stress Test  08/30/2024  CONCLUSIONS:    1. SPECT Perfusion Study: Abnormal.    2. There is mild (<10%) ischemia in the territory of the LCX.    3. There is a small (<10%) fixed perfusion defect in the RCA territory.    4. Left ventricle is severely dilated. The left ventricle systolic   function is moderately decreased.    5. This is an intermediate risk scan.        LVEF % 34       Assessment:   Patient recently admitted with ADHF, underwent DC cardioversion. Comes in using a wheel chair, complains of significant fatigue and occasional lightheadedness. Denies SOB, CP, syncope. Denies device shocks. He is on amiodarone and GDMT, including carvedilol. His ECG today shows  rhythm, positive V1,  QRS width is 194 ms, HR 71 bpm. Device interrogation shows afib, no recent VT. Device is programmed DDDR, 95% effective CRT pacing.     Plan:  Will refer the patient to Dr Escobedo's device clinic for evaluation of the possibility of optimizing his CRT-D.          [1] No family history on file.

## 2025-04-21 ENCOUNTER — OFFICE VISIT (OUTPATIENT)
Dept: CARDIOLOGY | Facility: HOSPITAL | Age: 89
End: 2025-04-21
Payer: MEDICARE

## 2025-04-21 VITALS
WEIGHT: 166 LBS | DIASTOLIC BLOOD PRESSURE: 67 MMHG | OXYGEN SATURATION: 94 % | SYSTOLIC BLOOD PRESSURE: 103 MMHG | HEART RATE: 74 BPM | BODY MASS INDEX: 22.51 KG/M2

## 2025-04-21 DIAGNOSIS — I42.8 CARDIOMYOPATHY, NONISCHEMIC (MULTI): Chronic | ICD-10-CM

## 2025-04-21 DIAGNOSIS — Z95.810 BIVENTRICULAR AUTOMATIC IMPLANTABLE CARDIOVERTER DEFIBRILLATOR IN SITU: ICD-10-CM

## 2025-04-21 DIAGNOSIS — I48.19 PERSISTENT ATRIAL FIBRILLATION (MULTI): Primary | ICD-10-CM

## 2025-04-21 PROCEDURE — 3078F DIAST BP <80 MM HG: CPT | Performed by: STUDENT IN AN ORGANIZED HEALTH CARE EDUCATION/TRAINING PROGRAM

## 2025-04-21 PROCEDURE — 99214 OFFICE O/P EST MOD 30 MIN: CPT | Performed by: STUDENT IN AN ORGANIZED HEALTH CARE EDUCATION/TRAINING PROGRAM

## 2025-04-21 PROCEDURE — 1111F DSCHRG MED/CURRENT MED MERGE: CPT | Performed by: STUDENT IN AN ORGANIZED HEALTH CARE EDUCATION/TRAINING PROGRAM

## 2025-04-21 PROCEDURE — 99214 OFFICE O/P EST MOD 30 MIN: CPT | Mod: 25 | Performed by: STUDENT IN AN ORGANIZED HEALTH CARE EDUCATION/TRAINING PROGRAM

## 2025-04-21 PROCEDURE — 93005 ELECTROCARDIOGRAM TRACING: CPT | Performed by: STUDENT IN AN ORGANIZED HEALTH CARE EDUCATION/TRAINING PROGRAM

## 2025-04-21 PROCEDURE — 1036F TOBACCO NON-USER: CPT | Performed by: STUDENT IN AN ORGANIZED HEALTH CARE EDUCATION/TRAINING PROGRAM

## 2025-04-21 PROCEDURE — 3074F SYST BP LT 130 MM HG: CPT | Performed by: STUDENT IN AN ORGANIZED HEALTH CARE EDUCATION/TRAINING PROGRAM

## 2025-04-21 PROCEDURE — 93010 ELECTROCARDIOGRAM REPORT: CPT | Performed by: STUDENT IN AN ORGANIZED HEALTH CARE EDUCATION/TRAINING PROGRAM

## 2025-04-21 PROCEDURE — 1159F MED LIST DOCD IN RCRD: CPT | Performed by: STUDENT IN AN ORGANIZED HEALTH CARE EDUCATION/TRAINING PROGRAM

## 2025-04-26 LAB
ATRIAL RATE: 78 BPM
Q ONSET: 189 MS
QRS COUNT: 12 BEATS
QRS DURATION: 194 MS
QT INTERVAL: 508 MS
QTC CALCULATION(BAZETT): 552 MS
QTC FREDERICIA: 537 MS
R AXIS: 215 DEGREES
T AXIS: 48 DEGREES
T OFFSET: 443 MS
VENTRICULAR RATE: 71 BPM

## 2025-05-08 ENCOUNTER — APPOINTMENT (OUTPATIENT)
Dept: CARDIOLOGY | Facility: HOSPITAL | Age: 89
End: 2025-05-08
Payer: MEDICARE

## 2025-05-13 ENCOUNTER — APPOINTMENT (OUTPATIENT)
Dept: CARDIOLOGY | Facility: CLINIC | Age: 89
End: 2025-05-13
Payer: MEDICARE

## 2025-05-14 ENCOUNTER — TELEPHONE (OUTPATIENT)
Dept: CARDIOLOGY | Facility: HOSPITAL | Age: 89
End: 2025-05-14
Payer: MEDICARE

## 2025-07-01 ENCOUNTER — HOSPITAL ENCOUNTER (EMERGENCY)
Facility: HOSPITAL | Age: 89
Discharge: HOME | End: 2025-07-01
Attending: EMERGENCY MEDICINE
Payer: MEDICARE

## 2025-07-01 ENCOUNTER — APPOINTMENT (OUTPATIENT)
Dept: CARDIOLOGY | Facility: HOSPITAL | Age: 89
End: 2025-07-01
Payer: MEDICARE

## 2025-07-01 VITALS
RESPIRATION RATE: 18 BRPM | HEART RATE: 70 BPM | OXYGEN SATURATION: 94 % | TEMPERATURE: 97.5 F | HEIGHT: 72 IN | BODY MASS INDEX: 24.92 KG/M2 | DIASTOLIC BLOOD PRESSURE: 78 MMHG | WEIGHT: 184 LBS | SYSTOLIC BLOOD PRESSURE: 92 MMHG

## 2025-07-01 DIAGNOSIS — I47.20 VENTRICULAR TACHYCARDIA (MULTI): Primary | ICD-10-CM

## 2025-07-01 LAB
ALBUMIN SERPL BCP-MCNC: 3.6 G/DL (ref 3.4–5)
ALP SERPL-CCNC: 134 U/L (ref 33–136)
ALT SERPL W P-5'-P-CCNC: 16 U/L (ref 10–52)
ANION GAP SERPL CALC-SCNC: 15 MMOL/L (ref 10–20)
AST SERPL W P-5'-P-CCNC: 24 U/L (ref 9–39)
BASOPHILS # BLD AUTO: 0.01 X10*3/UL (ref 0–0.1)
BASOPHILS NFR BLD AUTO: 0.2 %
BILIRUB SERPL-MCNC: 1.8 MG/DL (ref 0–1.2)
BUN SERPL-MCNC: 33 MG/DL (ref 6–23)
CALCIUM SERPL-MCNC: 8.8 MG/DL (ref 8.6–10.3)
CHLORIDE SERPL-SCNC: 100 MMOL/L (ref 98–107)
CO2 SERPL-SCNC: 32 MMOL/L (ref 21–32)
CREAT SERPL-MCNC: 1.88 MG/DL (ref 0.5–1.3)
EGFRCR SERPLBLD CKD-EPI 2021: 34 ML/MIN/1.73M*2
EOSINOPHIL # BLD AUTO: 0.01 X10*3/UL (ref 0–0.4)
EOSINOPHIL NFR BLD AUTO: 0.2 %
ERYTHROCYTE [DISTWIDTH] IN BLOOD BY AUTOMATED COUNT: 18.8 % (ref 11.5–14.5)
GLUCOSE SERPL-MCNC: 96 MG/DL (ref 74–99)
HCT VFR BLD AUTO: 50.9 % (ref 41–52)
HGB BLD-MCNC: 16.2 G/DL (ref 13.5–17.5)
IMM GRANULOCYTES # BLD AUTO: 0.01 X10*3/UL (ref 0–0.5)
IMM GRANULOCYTES NFR BLD AUTO: 0.2 % (ref 0–0.9)
LACTATE SERPL-SCNC: 1.2 MMOL/L (ref 0.4–2)
LYMPHOCYTES # BLD AUTO: 0.67 X10*3/UL (ref 0.8–3)
LYMPHOCYTES NFR BLD AUTO: 12.5 %
MAGNESIUM SERPL-MCNC: 2.21 MG/DL (ref 1.6–2.4)
MCH RBC QN AUTO: 29 PG (ref 26–34)
MCHC RBC AUTO-ENTMCNC: 31.8 G/DL (ref 32–36)
MCV RBC AUTO: 91 FL (ref 80–100)
MONOCYTES # BLD AUTO: 0.28 X10*3/UL (ref 0.05–0.8)
MONOCYTES NFR BLD AUTO: 5.2 %
NEUTROPHILS # BLD AUTO: 4.37 X10*3/UL (ref 1.6–5.5)
NEUTROPHILS NFR BLD AUTO: 81.7 %
NRBC BLD-RTO: 0 /100 WBCS (ref 0–0)
PLATELET # BLD AUTO: 153 X10*3/UL (ref 150–450)
POTASSIUM SERPL-SCNC: 3.6 MMOL/L (ref 3.5–5.3)
PROT SERPL-MCNC: 6.5 G/DL (ref 6.4–8.2)
RBC # BLD AUTO: 5.59 X10*6/UL (ref 4.5–5.9)
SODIUM SERPL-SCNC: 143 MMOL/L (ref 136–145)
WBC # BLD AUTO: 5.4 X10*3/UL (ref 4.4–11.3)

## 2025-07-01 PROCEDURE — 83605 ASSAY OF LACTIC ACID: CPT | Performed by: EMERGENCY MEDICINE

## 2025-07-01 PROCEDURE — 83735 ASSAY OF MAGNESIUM: CPT | Performed by: EMERGENCY MEDICINE

## 2025-07-01 PROCEDURE — 93005 ELECTROCARDIOGRAM TRACING: CPT

## 2025-07-01 PROCEDURE — 85025 COMPLETE CBC W/AUTO DIFF WBC: CPT | Performed by: EMERGENCY MEDICINE

## 2025-07-01 PROCEDURE — 99284 EMERGENCY DEPT VISIT MOD MDM: CPT | Performed by: EMERGENCY MEDICINE

## 2025-07-01 PROCEDURE — 80053 COMPREHEN METABOLIC PANEL: CPT | Performed by: EMERGENCY MEDICINE

## 2025-07-01 PROCEDURE — 36415 COLL VENOUS BLD VENIPUNCTURE: CPT | Performed by: EMERGENCY MEDICINE

## 2025-07-01 ASSESSMENT — LIFESTYLE VARIABLES
HAVE YOU EVER FELT YOU SHOULD CUT DOWN ON YOUR DRINKING: NO
EVER FELT BAD OR GUILTY ABOUT YOUR DRINKING: NO
TOTAL SCORE: 0
HAVE PEOPLE ANNOYED YOU BY CRITICIZING YOUR DRINKING: NO
EVER HAD A DRINK FIRST THING IN THE MORNING TO STEADY YOUR NERVES TO GET RID OF A HANGOVER: NO

## 2025-07-01 ASSESSMENT — PAIN SCALES - GENERAL: PAINLEVEL_OUTOF10: 0 - NO PAIN

## 2025-07-01 ASSESSMENT — PAIN - FUNCTIONAL ASSESSMENT: PAIN_FUNCTIONAL_ASSESSMENT: 0-10

## 2025-07-01 NOTE — ED PROVIDER NOTES
HPI   Chief Complaint   Patient presents with   • Pacemaker Problem       HPI        Patient History   Medical History[1]  Surgical History[2]  Family History[3]  Social History[4]    Physical Exam   ED Triage Vitals [07/01/25 1627]   Temperature Heart Rate Respirations BP   36.4 °C (97.5 °F) 70 18 (!) 122/91      Pulse Ox Temp Source Heart Rate Source Patient Position   94 % Temporal Monitor Lying      BP Location FiO2 (%)     Right arm --       Physical Exam      ED Course & MDM   Diagnoses as of 07/01/25 1746   Ventricular tachycardia (Multi)                 No data recorded     Darren Coma Scale Score: 15 (07/01/25 1702 : Arnold Goode RN)                           Medical Decision Making      Procedure  Procedures           [1]  No past medical history on file.  [2]  No past surgical history on file.  [3]  No family history on file.  [4]  Social History  Tobacco Use   • Smoking status: Never     Passive exposure: Never   • Smokeless tobacco: Never   Substance Use Topics   • Alcohol use: Never   • Drug use: Never                Medical Decision Making  Medical Decision Making: EKG interpreted by me shows a heart rate of 72 biventricular paced rhythm left axis normal intervals.  Lab work is all reassuring we interrogated the pacemaker he had a couple episodes of V. tach that lasted 1 second.  Spoke with the rep okay to discharge home with close follow-up.  Patient agrees with plan of care.  Differential includes V. tach V-fib sinus tach    [unfilled]     Shelbie Swain D.O.  Emergency Medicine          Procedure  Procedures         [1] No past medical history on file.  [2] No past surgical history on file.  [3] No family history on file.  [4]   Social History  Tobacco Use    Smoking status: Never     Passive exposure: Never    Smokeless tobacco: Never   Substance Use Topics    Alcohol use: Never    Drug use: Never        Shelbie Swain,   07/07/25 0941

## 2025-07-01 NOTE — ED TRIAGE NOTES
Pt brought in my medics for pacer/defibrillator shock. Pt denied feeling the shock. Pt NAD. Denies pain. Patient has medtronic pacemaker. EKG AV paced

## 2025-07-02 LAB
ATRIAL RATE: 72 BPM
P OFFSET: 132 MS
P ONSET: 91 MS
PR INTERVAL: 198 MS
Q ONSET: 190 MS
QRS COUNT: 12 BEATS
QRS DURATION: 192 MS
QT INTERVAL: 510 MS
QTC CALCULATION(BAZETT): 558 MS
QTC FREDERICIA: 542 MS
R AXIS: 212 DEGREES
T AXIS: 46 DEGREES
T OFFSET: 445 MS
VENTRICULAR RATE: 72 BPM

## 2025-07-10 ENCOUNTER — OFFICE VISIT (OUTPATIENT)
Dept: CARDIOLOGY | Facility: HOSPITAL | Age: 89
End: 2025-07-10
Payer: MEDICARE

## 2025-07-10 VITALS
OXYGEN SATURATION: 93 % | WEIGHT: 184 LBS | SYSTOLIC BLOOD PRESSURE: 120 MMHG | BODY MASS INDEX: 24.95 KG/M2 | HEART RATE: 70 BPM | DIASTOLIC BLOOD PRESSURE: 69 MMHG

## 2025-07-10 DIAGNOSIS — Z95.810 BIVENTRICULAR AUTOMATIC IMPLANTABLE CARDIOVERTER DEFIBRILLATOR IN SITU: ICD-10-CM

## 2025-07-10 DIAGNOSIS — I42.8 CARDIOMYOPATHY, NONISCHEMIC (MULTI): ICD-10-CM

## 2025-07-10 DIAGNOSIS — I48.19 PERSISTENT ATRIAL FIBRILLATION (MULTI): Primary | ICD-10-CM

## 2025-07-10 DIAGNOSIS — I50.23 ACUTE ON CHRONIC SYSTOLIC HEART FAILURE: ICD-10-CM

## 2025-07-10 DIAGNOSIS — I47.20 VENTRICULAR TACHYCARDIA (MULTI): ICD-10-CM

## 2025-07-10 PROCEDURE — 99214 OFFICE O/P EST MOD 30 MIN: CPT | Performed by: STUDENT IN AN ORGANIZED HEALTH CARE EDUCATION/TRAINING PROGRAM

## 2025-07-10 PROCEDURE — 1036F TOBACCO NON-USER: CPT | Performed by: STUDENT IN AN ORGANIZED HEALTH CARE EDUCATION/TRAINING PROGRAM

## 2025-07-10 PROCEDURE — 3074F SYST BP LT 130 MM HG: CPT | Performed by: STUDENT IN AN ORGANIZED HEALTH CARE EDUCATION/TRAINING PROGRAM

## 2025-07-10 PROCEDURE — 99212 OFFICE O/P EST SF 10 MIN: CPT

## 2025-07-10 PROCEDURE — 3078F DIAST BP <80 MM HG: CPT | Performed by: STUDENT IN AN ORGANIZED HEALTH CARE EDUCATION/TRAINING PROGRAM

## 2025-07-10 PROCEDURE — 1159F MED LIST DOCD IN RCRD: CPT | Performed by: STUDENT IN AN ORGANIZED HEALTH CARE EDUCATION/TRAINING PROGRAM

## 2025-07-10 RX ORDER — MEXILETINE HYDROCHLORIDE 150 MG/1
150 CAPSULE ORAL EVERY 12 HOURS
Qty: 60 CAPSULE | Refills: 11 | Status: ON HOLD | OUTPATIENT
Start: 2025-07-10 | End: 2026-07-10

## 2025-07-10 RX ORDER — AMIODARONE HYDROCHLORIDE 200 MG/1
200 TABLET ORAL DAILY
Qty: 90 TABLET | Refills: 3 | Status: ON HOLD | OUTPATIENT
Start: 2025-07-10 | End: 2026-07-10

## 2025-07-10 NOTE — PROGRESS NOTES
[facility] Heart and Vascular Electrophysiology    Patient Name: Valentin Rojas  Patient : 1935    Referred for  VT    History of Present Illness:  Valentin Rojas is a 89 y.o. 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  CHB: s/p CRT-D upgrade 2013 with generator change on 2018  Afib: s/p DCCV 10/2019, 2025 successful DCCV with 1 shock at 200 joules where sinus rhythm was restored.  CKD3  T2DM  HTN  HLD  GERD  BPH  Hypothyroidism  CP 2025 presented at [facility] and per EMS he was having runs of VT on the monitor.  Telemetry overnight showed NSVT.  Device interrogation revealed one episode of VT treated with ATP successfully, several episodes of NSVT, 100% afib burden.    Patient was admitted with ADHF back in 2025, underwent DC cardioversion. He is on amiodarone and GDMT, including carvedilol. His ECG from 2025 shows  rhythm, positive V1, QRS width is 194 ms, HR 71 bpm.  He comes in today for follow-up.  He complains of shortness of breath.  His activity level is very limited due to pain on his knees.  Device transmission showed 3 episodes of VT, 1 treated with 1 shock and the other 2 treated with ATPs, all episodes in 2025.  92% effective CRT pacing.     Past Medical History:  He has no past medical history on file.    Past Surgical History:  He has no past surgical history on file.      Social History:  He reports that he has never smoked. He has never been exposed to tobacco smoke. He has never used smokeless tobacco. He reports that he does not drink alcohol and does not use drugs.    Family History:  Family History[1]     Allergies:  Penicillins    Outpatient Medications:  Current Outpatient Medications   Medication Instructions    amiodarone (PACERONE) 200 mg, oral, 2 times daily    apixaban  (ELIQUIS) 2.5 mg, oral, 2 times daily    carvedilol (COREG) 25 mg, 2 times daily    empagliflozin (JARDIANCE) 25 mg, Daily    furosemide (LASIX) 40 mg, oral, Every morning, Pt takes 40 mg in the morning and then additional 20 mg AT NIGHT for a daily total of 60 mg.    furosemide (LASIX) 20 mg, oral, Nightly, Pt takes 40 mg in the morning and then additional 20 mg AT NIGHT for a daily total of 60 mg.    glipiZIDE XL (GLUCOTROL XL) 5 mg, Daily    lansoprazole (PREVACID) 15 mg, Daily PRN    Lantus U-100 Insulin 22 Units, Nightly    levothyroxine (SYNTHROID, LEVOXYL) 75 mcg, Daily RT    lisinopril 10 mg, Daily    oxyCODONE-acetaminophen (Percocet) 5-325 mg tablet 1 tablet, Every 8 hours PRN    pantoprazole (PROTONIX) 40 mg, oral, Daily before breakfast, Do not crush, chew, or split.    POTASSIUM CHLORIDE ORAL 99 mg, Daily    rosuvastatin (CRESTOR) 10 mg, Daily    spironolactone (ALDACTONE) 25 mg, oral, Every 24 hours scheduled    tamsulosin (FLOMAX) 0.4 mg, Nightly        ROS:  A 14 point review of systems was done and is negative other than as stated in HPI    Physical Exam  Cardiovascular:      Rate and Rhythm: Normal rate and regular rhythm.      Heart sounds: No murmur heard.     No friction rub. No gallop.   Pulmonary:      Effort: Pulmonary effort is normal.      Breath sounds: Normal breath sounds.   Abdominal:      Palpations: Abdomen is soft.   Musculoskeletal:      Cervical back: Neck supple.   Neurological:      Mental Status: He is alert.   Psychiatric:         Mood and Affect: Mood normal.         Behavior: Behavior normal.       Vitals:  Visit Vitals  /69   Pulse 70          Labs:   CBC  Lab Results   Component Value Date    WBC 5.4 07/01/2025    HGB 16.2 07/01/2025    HCT 50.9 07/01/2025    MCV 91 07/01/2025     07/01/2025        Renal Function Panel  Lab Results   Component Value Date    GLUCOSE 96 07/01/2025     07/01/2025    K 3.6 07/01/2025     07/01/2025    CO2 32 07/01/2025     ANIONGAP 15 07/01/2025    BUN 33 (H) 07/01/2025    CREATININE 1.88 (H) 07/01/2025    CALCIUM 8.8 07/01/2025        CMP  Lab Results   Component Value Date    CALCIUM 8.8 07/01/2025    PHOS 3.3 01/15/2025    PROT 6.5 07/01/2025    ALBUMIN 3.6 07/01/2025    AST 24 07/01/2025    ALT 16 07/01/2025    ALKPHOS 134 07/01/2025    BILITOT 1.8 (H) 07/01/2025       TSH  Lab Results   Component Value Date    TSH 6.71 (H) 03/26/2025    FREET4 1.54 (H) 03/26/2025          Cardiac Testing:  ECG  04/21/2025   rhythm, positive V1, QRS width is 194 ms, HR 71 bpm.    Echocardiogram  01/13/2025  PHYSICIAN INTERPRETATION:  Left Ventricle: The left ventricular systolic function is severely decreased, with a visually estimated ejection fraction of 20-25%. There is mild eccentric left ventricular hypertrophy. There is global hypokinesis of the left ventricle with minor regional variations. The left ventricular cavity size is moderately dilated. There is mildly increased septal and normal posterior left ventricular wall thickness. Left ventricular diastolic filling cannot be determined, due to right ventricular pacing.  Left Atrium: The left atrium is severely dilated.  Right Ventricle: The right ventricle is severely enlarged. There is reduced right ventricular systolic function. A device is visualized in the right ventricle.  Right Atrium: The right atrium is moderately dilated. There is a device visualized in the right atrium.  Aortic Valve: The aortic valve is trileaflet. There is mild aortic valve cusp calcification. The aortic valve dimensionless index is 0.67. There is no evidence of aortic valve regurgitation. The peak instantaneous gradient of the aortic valve is 2 mmHg. The mean gradient of the aortic valve is 1 mmHg.  Mitral Valve: The mitral valve is mild to moderately thickened. There is mild to moderate mitral valve regurgitation.  Tricuspid Valve: The tricuspid valve is structurally normal. There is mild to moderate  tricuspid regurgitation. The Doppler estimated RVSP is within normal limits at 23.2 mmHg.  Pulmonic Valve: The pulmonic valve is structurally normal. There is moderate pulmonic valve regurgitation.  Pericardium: There is no pericardial effusion noted.  Aorta: The aortic root is normal.        CONCLUSIONS:   1. The left ventricular systolic function is severely decreased, with a visually estimated ejection fraction of 20-25%.   2. There is global hypokinesis of the left ventricle with minor regional variations.   3. Left ventricular cavity size is moderately dilated.   4. There is reduced right ventricular systolic function.   5. Severely enlarged right ventricle.   6. The left atrium is severely dilated.   7. The right atrium is moderately dilated.   8. Mild to moderate mitral valve regurgitation.   9. Mild to moderate tricuspid regurgitation visualized.  10. Right ventricular systolic pressure is within normal limits.  11. There is moderate pulmonic valve regurgitation.      Nuclear Stress Test  08/30/2024  CONCLUSIONS:    1. SPECT Perfusion Study: Abnormal.    2. There is mild (<10%) ischemia in the territory of the LCX.    3. There is a small (<10%) fixed perfusion defect in the RCA territory.    4. Left ventricle is severely dilated. The left ventricle systolic   function is moderately decreased.    5. This is an intermediate risk scan.        LVEF % 34       Assessment:   Patient was admitted with ADHF back in March 2025, underwent DC cardioversion. He is on amiodarone and GDMT, including carvedilol. His ECG from April 2025 shows  rhythm, positive V1, QRS width is 194 ms, HR 71 bpm.  He comes in today for follow-up.  He complains of shortness of breath.  His activity level is very limited due to pain on his knees.  Device transmission showed 3 episodes of VT, 1 treated with 1 shock and the other 2 treated with ATPs, all episodes in June 30 2025.  92% effective CRT pacing.     Plan:  Will refer the patient to  Dr. Bai for evaluation and optimization of his heart failure treatment.  Will initiate mexiletine 150 mg twice daily.  Will schedule follow-up.           [1] No family history on file.

## 2025-07-13 ENCOUNTER — APPOINTMENT (OUTPATIENT)
Dept: CARDIOLOGY | Facility: HOSPITAL | Age: 89
DRG: 291 | End: 2025-07-13
Payer: MEDICARE

## 2025-07-13 ENCOUNTER — APPOINTMENT (OUTPATIENT)
Dept: RADIOLOGY | Facility: HOSPITAL | Age: 89
DRG: 291 | End: 2025-07-13
Payer: MEDICARE

## 2025-07-13 ENCOUNTER — HOSPITAL ENCOUNTER (INPATIENT)
Facility: HOSPITAL | Age: 89
DRG: 291 | End: 2025-07-13
Attending: STUDENT IN AN ORGANIZED HEALTH CARE EDUCATION/TRAINING PROGRAM | Admitting: INTERNAL MEDICINE
Payer: MEDICARE

## 2025-07-13 DIAGNOSIS — I47.20 VENTRICULAR TACHYCARDIA (PAROXYSMAL): Primary | ICD-10-CM

## 2025-07-13 DIAGNOSIS — I50.23 ACUTE ON CHRONIC SYSTOLIC HEART FAILURE: ICD-10-CM

## 2025-07-13 DIAGNOSIS — L03.90 WOUND CELLULITIS: ICD-10-CM

## 2025-07-13 DIAGNOSIS — I50.9 HEART FAILURE, UNSPECIFIED: ICD-10-CM

## 2025-07-13 DIAGNOSIS — I42.8 CARDIOMYOPATHY, NONISCHEMIC (MULTI): Chronic | ICD-10-CM

## 2025-07-13 LAB
ALBUMIN SERPL BCP-MCNC: 3.3 G/DL (ref 3.4–5)
ALP SERPL-CCNC: 148 U/L (ref 33–136)
ALT SERPL W P-5'-P-CCNC: 22 U/L (ref 10–52)
ANION GAP SERPL CALC-SCNC: 14 MMOL/L (ref 10–20)
APTT PPP: 38 SECONDS (ref 26–36)
AST SERPL W P-5'-P-CCNC: 32 U/L (ref 9–39)
BASOPHILS # BLD AUTO: 0.02 X10*3/UL (ref 0–0.1)
BASOPHILS NFR BLD AUTO: 0.3 %
BILIRUB SERPL-MCNC: 1.5 MG/DL (ref 0–1.2)
BNP SERPL-MCNC: 2051 PG/ML (ref 0–99)
BUN SERPL-MCNC: 38 MG/DL (ref 6–23)
CALCIUM SERPL-MCNC: 8.4 MG/DL (ref 8.6–10.3)
CARDIAC TROPONIN I PNL SERPL HS: 21 NG/L (ref 0–20)
CARDIAC TROPONIN I PNL SERPL HS: 23 NG/L (ref 0–20)
CHLORIDE SERPL-SCNC: 103 MMOL/L (ref 98–107)
CO2 SERPL-SCNC: 29 MMOL/L (ref 21–32)
CREAT SERPL-MCNC: 2.1 MG/DL (ref 0.5–1.3)
EGFRCR SERPLBLD CKD-EPI 2021: 30 ML/MIN/1.73M*2
EOSINOPHIL # BLD AUTO: 0.02 X10*3/UL (ref 0–0.4)
EOSINOPHIL NFR BLD AUTO: 0.3 %
ERYTHROCYTE [DISTWIDTH] IN BLOOD BY AUTOMATED COUNT: 19 % (ref 11.5–14.5)
GLUCOSE BLD MANUAL STRIP-MCNC: 148 MG/DL (ref 74–99)
GLUCOSE BLD MANUAL STRIP-MCNC: 198 MG/DL (ref 74–99)
GLUCOSE BLD MANUAL STRIP-MCNC: 239 MG/DL (ref 74–99)
GLUCOSE SERPL-MCNC: 106 MG/DL (ref 74–99)
HCT VFR BLD AUTO: 50.2 % (ref 41–52)
HGB BLD-MCNC: 15.9 G/DL (ref 13.5–17.5)
IMM GRANULOCYTES # BLD AUTO: 0.02 X10*3/UL (ref 0–0.5)
IMM GRANULOCYTES NFR BLD AUTO: 0.3 % (ref 0–0.9)
INR PPP: 1.5 (ref 0.9–1.1)
LACTATE SERPL-SCNC: 2 MMOL/L (ref 0.4–2)
LYMPHOCYTES # BLD AUTO: 0.85 X10*3/UL (ref 0.8–3)
LYMPHOCYTES NFR BLD AUTO: 13.6 %
MAGNESIUM SERPL-MCNC: 2.39 MG/DL (ref 1.6–2.4)
MCH RBC QN AUTO: 28.9 PG (ref 26–34)
MCHC RBC AUTO-ENTMCNC: 31.7 G/DL (ref 32–36)
MCV RBC AUTO: 91 FL (ref 80–100)
MONOCYTES # BLD AUTO: 0.29 X10*3/UL (ref 0.05–0.8)
MONOCYTES NFR BLD AUTO: 4.6 %
NEUTROPHILS # BLD AUTO: 5.07 X10*3/UL (ref 1.6–5.5)
NEUTROPHILS NFR BLD AUTO: 80.9 %
NRBC BLD-RTO: 0 /100 WBCS (ref 0–0)
PHOSPHATE SERPL-MCNC: 4.4 MG/DL (ref 2.5–4.9)
PLATELET # BLD AUTO: 160 X10*3/UL (ref 150–450)
POTASSIUM SERPL-SCNC: 4.1 MMOL/L (ref 3.5–5.3)
PROT SERPL-MCNC: 6.3 G/DL (ref 6.4–8.2)
PROTHROMBIN TIME: 16.2 SECONDS (ref 9.8–12.4)
RBC # BLD AUTO: 5.5 X10*6/UL (ref 4.5–5.9)
SODIUM SERPL-SCNC: 142 MMOL/L (ref 136–145)
T4 FREE SERPL-MCNC: 1.21 NG/DL (ref 0.61–1.12)
TSH SERPL-ACNC: 16.97 MIU/L (ref 0.44–3.98)
WBC # BLD AUTO: 6.3 X10*3/UL (ref 4.4–11.3)

## 2025-07-13 PROCEDURE — 96376 TX/PRO/DX INJ SAME DRUG ADON: CPT

## 2025-07-13 PROCEDURE — 2060000001 HC INTERMEDIATE ICU ROOM DAILY

## 2025-07-13 PROCEDURE — 93005 ELECTROCARDIOGRAM TRACING: CPT

## 2025-07-13 PROCEDURE — 84100 ASSAY OF PHOSPHORUS: CPT | Performed by: STUDENT IN AN ORGANIZED HEALTH CARE EDUCATION/TRAINING PROGRAM

## 2025-07-13 PROCEDURE — 2500000002 HC RX 250 W HCPCS SELF ADMINISTERED DRUGS (ALT 637 FOR MEDICARE OP, ALT 636 FOR OP/ED): Performed by: NURSE PRACTITIONER

## 2025-07-13 PROCEDURE — 84484 ASSAY OF TROPONIN QUANT: CPT | Performed by: STUDENT IN AN ORGANIZED HEALTH CARE EDUCATION/TRAINING PROGRAM

## 2025-07-13 PROCEDURE — 2500000002 HC RX 250 W HCPCS SELF ADMINISTERED DRUGS (ALT 637 FOR MEDICARE OP, ALT 636 FOR OP/ED): Performed by: INTERNAL MEDICINE

## 2025-07-13 PROCEDURE — 2500000004 HC RX 250 GENERAL PHARMACY W/ HCPCS (ALT 636 FOR OP/ED): Performed by: STUDENT IN AN ORGANIZED HEALTH CARE EDUCATION/TRAINING PROGRAM

## 2025-07-13 PROCEDURE — 2500000005 HC RX 250 GENERAL PHARMACY W/O HCPCS: Performed by: STUDENT IN AN ORGANIZED HEALTH CARE EDUCATION/TRAINING PROGRAM

## 2025-07-13 PROCEDURE — 83880 ASSAY OF NATRIURETIC PEPTIDE: CPT | Performed by: STUDENT IN AN ORGANIZED HEALTH CARE EDUCATION/TRAINING PROGRAM

## 2025-07-13 PROCEDURE — 2500000005 HC RX 250 GENERAL PHARMACY W/O HCPCS: Performed by: INTERNAL MEDICINE

## 2025-07-13 PROCEDURE — 82947 ASSAY GLUCOSE BLOOD QUANT: CPT

## 2025-07-13 PROCEDURE — 36415 COLL VENOUS BLD VENIPUNCTURE: CPT | Performed by: STUDENT IN AN ORGANIZED HEALTH CARE EDUCATION/TRAINING PROGRAM

## 2025-07-13 PROCEDURE — 85730 THROMBOPLASTIN TIME PARTIAL: CPT | Performed by: STUDENT IN AN ORGANIZED HEALTH CARE EDUCATION/TRAINING PROGRAM

## 2025-07-13 PROCEDURE — 99223 1ST HOSP IP/OBS HIGH 75: CPT | Performed by: INTERNAL MEDICINE

## 2025-07-13 PROCEDURE — 2500000004 HC RX 250 GENERAL PHARMACY W/ HCPCS (ALT 636 FOR OP/ED): Performed by: INTERNAL MEDICINE

## 2025-07-13 PROCEDURE — 96374 THER/PROPH/DIAG INJ IV PUSH: CPT

## 2025-07-13 PROCEDURE — 83735 ASSAY OF MAGNESIUM: CPT | Performed by: STUDENT IN AN ORGANIZED HEALTH CARE EDUCATION/TRAINING PROGRAM

## 2025-07-13 PROCEDURE — 71045 X-RAY EXAM CHEST 1 VIEW: CPT

## 2025-07-13 PROCEDURE — 99291 CRITICAL CARE FIRST HOUR: CPT | Mod: 25 | Performed by: STUDENT IN AN ORGANIZED HEALTH CARE EDUCATION/TRAINING PROGRAM

## 2025-07-13 PROCEDURE — 84439 ASSAY OF FREE THYROXINE: CPT | Performed by: INTERNAL MEDICINE

## 2025-07-13 PROCEDURE — 94760 N-INVAS EAR/PLS OXIMETRY 1: CPT

## 2025-07-13 PROCEDURE — 71045 X-RAY EXAM CHEST 1 VIEW: CPT | Performed by: STUDENT IN AN ORGANIZED HEALTH CARE EDUCATION/TRAINING PROGRAM

## 2025-07-13 PROCEDURE — 84075 ASSAY ALKALINE PHOSPHATASE: CPT | Performed by: STUDENT IN AN ORGANIZED HEALTH CARE EDUCATION/TRAINING PROGRAM

## 2025-07-13 PROCEDURE — 84443 ASSAY THYROID STIM HORMONE: CPT | Performed by: INTERNAL MEDICINE

## 2025-07-13 PROCEDURE — 99221 1ST HOSP IP/OBS SF/LOW 40: CPT | Performed by: NURSE PRACTITIONER

## 2025-07-13 PROCEDURE — 99285 EMERGENCY DEPT VISIT HI MDM: CPT | Mod: 25 | Performed by: STUDENT IN AN ORGANIZED HEALTH CARE EDUCATION/TRAINING PROGRAM

## 2025-07-13 PROCEDURE — 2500000001 HC RX 250 WO HCPCS SELF ADMINISTERED DRUGS (ALT 637 FOR MEDICARE OP): Performed by: INTERNAL MEDICINE

## 2025-07-13 PROCEDURE — 83605 ASSAY OF LACTIC ACID: CPT | Performed by: STUDENT IN AN ORGANIZED HEALTH CARE EDUCATION/TRAINING PROGRAM

## 2025-07-13 PROCEDURE — 85025 COMPLETE CBC W/AUTO DIFF WBC: CPT | Performed by: STUDENT IN AN ORGANIZED HEALTH CARE EDUCATION/TRAINING PROGRAM

## 2025-07-13 RX ORDER — INSULIN GLARGINE 100 [IU]/ML
22 INJECTION, SOLUTION SUBCUTANEOUS NIGHTLY
Status: DISCONTINUED | OUTPATIENT
Start: 2025-07-13 | End: 2025-07-15

## 2025-07-13 RX ORDER — DEXTROSE 50 % IN WATER (D50W) INTRAVENOUS SYRINGE
25
Status: DISCONTINUED | OUTPATIENT
Start: 2025-07-13 | End: 2025-07-17 | Stop reason: HOSPADM

## 2025-07-13 RX ORDER — SPIRONOLACTONE 25 MG/1
25 TABLET ORAL
Status: DISCONTINUED | OUTPATIENT
Start: 2025-07-13 | End: 2025-07-17 | Stop reason: HOSPADM

## 2025-07-13 RX ORDER — ACETAMINOPHEN 160 MG/5ML
650 SOLUTION ORAL EVERY 4 HOURS PRN
Status: DISCONTINUED | OUTPATIENT
Start: 2025-07-13 | End: 2025-07-17 | Stop reason: HOSPADM

## 2025-07-13 RX ORDER — TAMSULOSIN HYDROCHLORIDE 0.4 MG/1
0.4 CAPSULE ORAL NIGHTLY
Status: DISCONTINUED | OUTPATIENT
Start: 2025-07-13 | End: 2025-07-17 | Stop reason: HOSPADM

## 2025-07-13 RX ORDER — OXYCODONE AND ACETAMINOPHEN 5; 325 MG/1; MG/1
1 TABLET ORAL EVERY 8 HOURS PRN
Status: DISCONTINUED | OUTPATIENT
Start: 2025-07-13 | End: 2025-07-17 | Stop reason: HOSPADM

## 2025-07-13 RX ORDER — LEVOTHYROXINE SODIUM 75 UG/1
75 TABLET ORAL
Status: DISCONTINUED | OUTPATIENT
Start: 2025-07-13 | End: 2025-07-17 | Stop reason: HOSPADM

## 2025-07-13 RX ORDER — DEXTROSE 50 % IN WATER (D50W) INTRAVENOUS SYRINGE
12.5
Status: DISCONTINUED | OUTPATIENT
Start: 2025-07-13 | End: 2025-07-17 | Stop reason: HOSPADM

## 2025-07-13 RX ORDER — ACETAMINOPHEN 325 MG/1
650 TABLET ORAL EVERY 4 HOURS PRN
Status: DISCONTINUED | OUTPATIENT
Start: 2025-07-13 | End: 2025-07-17 | Stop reason: HOSPADM

## 2025-07-13 RX ORDER — INSULIN LISPRO 100 [IU]/ML
0-5 INJECTION, SOLUTION INTRAVENOUS; SUBCUTANEOUS
Status: DISCONTINUED | OUTPATIENT
Start: 2025-07-13 | End: 2025-07-17 | Stop reason: HOSPADM

## 2025-07-13 RX ORDER — FUROSEMIDE 10 MG/ML
40 INJECTION INTRAMUSCULAR; INTRAVENOUS EVERY 12 HOURS
Status: DISCONTINUED | OUTPATIENT
Start: 2025-07-13 | End: 2025-07-16

## 2025-07-13 RX ORDER — INSULIN GLARGINE 100 [IU]/ML
15 INJECTION, SOLUTION SUBCUTANEOUS ONCE
Status: COMPLETED | OUTPATIENT
Start: 2025-07-13 | End: 2025-07-13

## 2025-07-13 RX ORDER — FUROSEMIDE 10 MG/ML
80 INJECTION INTRAMUSCULAR; INTRAVENOUS ONCE
Status: COMPLETED | OUTPATIENT
Start: 2025-07-13 | End: 2025-07-13

## 2025-07-13 RX ORDER — LISINOPRIL 5 MG/1
10 TABLET ORAL DAILY
Status: DISCONTINUED | OUTPATIENT
Start: 2025-07-13 | End: 2025-07-17 | Stop reason: HOSPADM

## 2025-07-13 RX ORDER — ROSUVASTATIN CALCIUM 10 MG/1
10 TABLET, COATED ORAL DAILY
Status: DISCONTINUED | OUTPATIENT
Start: 2025-07-13 | End: 2025-07-17 | Stop reason: HOSPADM

## 2025-07-13 RX ORDER — CARVEDILOL 12.5 MG/1
25 TABLET ORAL 2 TIMES DAILY
Status: DISCONTINUED | OUTPATIENT
Start: 2025-07-13 | End: 2025-07-15

## 2025-07-13 RX ORDER — ACETAMINOPHEN 650 MG/1
650 SUPPOSITORY RECTAL EVERY 4 HOURS PRN
Status: DISCONTINUED | OUTPATIENT
Start: 2025-07-13 | End: 2025-07-17 | Stop reason: HOSPADM

## 2025-07-13 RX ORDER — MEXILETINE HYDROCHLORIDE 150 MG/1
150 CAPSULE ORAL EVERY 12 HOURS
Status: DISCONTINUED | OUTPATIENT
Start: 2025-07-13 | End: 2025-07-17 | Stop reason: HOSPADM

## 2025-07-13 RX ADMIN — AMIODARONE HYDROCHLORIDE 1 MG/MIN: 1.8 INJECTION, SOLUTION INTRAVENOUS at 11:36

## 2025-07-13 RX ADMIN — CARVEDILOL 25 MG: 12.5 TABLET, FILM COATED ORAL at 20:32

## 2025-07-13 RX ADMIN — Medication 2 L/MIN: at 20:38

## 2025-07-13 RX ADMIN — Medication 2 L/MIN: at 21:00

## 2025-07-13 RX ADMIN — AMIODARONE HYDROCHLORIDE 1 MG/MIN: 1.8 INJECTION, SOLUTION INTRAVENOUS at 08:42

## 2025-07-13 RX ADMIN — FUROSEMIDE 80 MG: 10 INJECTION, SOLUTION INTRAMUSCULAR; INTRAVENOUS at 10:43

## 2025-07-13 RX ADMIN — INSULIN LISPRO 2 UNITS: 100 INJECTION, SOLUTION INTRAVENOUS; SUBCUTANEOUS at 16:48

## 2025-07-13 RX ADMIN — MEXILETINE HYDROCHLORIDE 150 MG: 150 CAPSULE ORAL at 13:40

## 2025-07-13 RX ADMIN — Medication 2 L/MIN: at 08:57

## 2025-07-13 RX ADMIN — INSULIN GLARGINE 15 UNITS: 100 INJECTION, SOLUTION SUBCUTANEOUS at 21:42

## 2025-07-13 RX ADMIN — LEVOTHYROXINE SODIUM 75 MCG: 0.07 TABLET ORAL at 12:37

## 2025-07-13 RX ADMIN — MEXILETINE HYDROCHLORIDE 150 MG: 150 CAPSULE ORAL at 22:48

## 2025-07-13 RX ADMIN — FUROSEMIDE 40 MG: 10 INJECTION, SOLUTION INTRAMUSCULAR; INTRAVENOUS at 22:48

## 2025-07-13 RX ADMIN — CARVEDILOL 25 MG: 12.5 TABLET, FILM COATED ORAL at 12:36

## 2025-07-13 RX ADMIN — SPIRONOLACTONE 25 MG: 25 TABLET ORAL at 12:36

## 2025-07-13 RX ADMIN — AMIODARONE HYDROCHLORIDE 0.5 MG/MIN: 1.8 INJECTION, SOLUTION INTRAVENOUS at 20:25

## 2025-07-13 RX ADMIN — LISINOPRIL 10 MG: 5 TABLET ORAL at 12:36

## 2025-07-13 RX ADMIN — AMIODARONE HYDROCHLORIDE 150 MG: 1.5 INJECTION, SOLUTION INTRAVENOUS at 08:43

## 2025-07-13 RX ADMIN — APIXABAN 2.5 MG: 5 TABLET, FILM COATED ORAL at 20:32

## 2025-07-13 RX ADMIN — TAMSULOSIN HYDROCHLORIDE 0.4 MG: 0.4 CAPSULE ORAL at 20:32

## 2025-07-13 RX ADMIN — ROSUVASTATIN CALCIUM 10 MG: 10 TABLET, FILM COATED ORAL at 12:36

## 2025-07-13 RX ADMIN — EMPAGLIFLOZIN 25 MG: 25 TABLET, FILM COATED ORAL at 13:41

## 2025-07-13 SDOH — SOCIAL STABILITY: SOCIAL INSECURITY: HAVE YOU HAD THOUGHTS OF HARMING ANYONE ELSE?: NO

## 2025-07-13 SDOH — ECONOMIC STABILITY: HOUSING INSECURITY: IN THE LAST 12 MONTHS, WAS THERE A TIME WHEN YOU WERE NOT ABLE TO PAY THE MORTGAGE OR RENT ON TIME?: NO

## 2025-07-13 SDOH — SOCIAL STABILITY: SOCIAL INSECURITY: HAS ANYONE EVER THREATENED TO HURT YOUR FAMILY OR YOUR PETS?: NO

## 2025-07-13 SDOH — SOCIAL STABILITY: SOCIAL INSECURITY: WITHIN THE LAST YEAR, HAVE YOU BEEN HUMILIATED OR EMOTIONALLY ABUSED IN OTHER WAYS BY YOUR PARTNER OR EX-PARTNER?: NO

## 2025-07-13 SDOH — ECONOMIC STABILITY: HOUSING INSECURITY: IN THE PAST 12 MONTHS, HOW MANY TIMES HAVE YOU MOVED WHERE YOU WERE LIVING?: 0

## 2025-07-13 SDOH — ECONOMIC STABILITY: INCOME INSECURITY: IN THE PAST 12 MONTHS HAS THE ELECTRIC, GAS, OIL, OR WATER COMPANY THREATENED TO SHUT OFF SERVICES IN YOUR HOME?: NO

## 2025-07-13 SDOH — SOCIAL STABILITY: SOCIAL INSECURITY: WITHIN THE LAST YEAR, HAVE YOU BEEN AFRAID OF YOUR PARTNER OR EX-PARTNER?: NO

## 2025-07-13 SDOH — ECONOMIC STABILITY: FOOD INSECURITY: WITHIN THE PAST 12 MONTHS, YOU WORRIED THAT YOUR FOOD WOULD RUN OUT BEFORE YOU GOT THE MONEY TO BUY MORE.: NEVER TRUE

## 2025-07-13 SDOH — ECONOMIC STABILITY: FOOD INSECURITY: WITHIN THE PAST 12 MONTHS, THE FOOD YOU BOUGHT JUST DIDN'T LAST AND YOU DIDN'T HAVE MONEY TO GET MORE.: NEVER TRUE

## 2025-07-13 SDOH — SOCIAL STABILITY: SOCIAL INSECURITY: DO YOU FEEL UNSAFE GOING BACK TO THE PLACE WHERE YOU ARE LIVING?: NO

## 2025-07-13 SDOH — ECONOMIC STABILITY: FOOD INSECURITY: HOW HARD IS IT FOR YOU TO PAY FOR THE VERY BASICS LIKE FOOD, HOUSING, MEDICAL CARE, AND HEATING?: NOT HARD AT ALL

## 2025-07-13 SDOH — SOCIAL STABILITY: SOCIAL INSECURITY: DOES ANYONE TRY TO KEEP YOU FROM HAVING/CONTACTING OTHER FRIENDS OR DOING THINGS OUTSIDE YOUR HOME?: NO

## 2025-07-13 SDOH — SOCIAL STABILITY: SOCIAL INSECURITY: ARE YOU OR HAVE YOU BEEN THREATENED OR ABUSED PHYSICALLY, EMOTIONALLY, OR SEXUALLY BY ANYONE?: NO

## 2025-07-13 SDOH — ECONOMIC STABILITY: TRANSPORTATION INSECURITY: IN THE PAST 12 MONTHS, HAS LACK OF TRANSPORTATION KEPT YOU FROM MEDICAL APPOINTMENTS OR FROM GETTING MEDICATIONS?: NO

## 2025-07-13 SDOH — ECONOMIC STABILITY: HOUSING INSECURITY: AT ANY TIME IN THE PAST 12 MONTHS, WERE YOU HOMELESS OR LIVING IN A SHELTER (INCLUDING NOW)?: NO

## 2025-07-13 SDOH — SOCIAL STABILITY: SOCIAL INSECURITY: ARE THERE ANY APPARENT SIGNS OF INJURIES/BEHAVIORS THAT COULD BE RELATED TO ABUSE/NEGLECT?: NO

## 2025-07-13 SDOH — SOCIAL STABILITY: SOCIAL INSECURITY: ABUSE: ADULT

## 2025-07-13 SDOH — SOCIAL STABILITY: SOCIAL INSECURITY: WERE YOU ABLE TO COMPLETE ALL THE BEHAVIORAL HEALTH SCREENINGS?: YES

## 2025-07-13 SDOH — SOCIAL STABILITY: SOCIAL INSECURITY: HAVE YOU HAD ANY THOUGHTS OF HARMING ANYONE ELSE?: NO

## 2025-07-13 SDOH — SOCIAL STABILITY: SOCIAL INSECURITY: DO YOU FEEL ANYONE HAS EXPLOITED OR TAKEN ADVANTAGE OF YOU FINANCIALLY OR OF YOUR PERSONAL PROPERTY?: NO

## 2025-07-13 ASSESSMENT — ENCOUNTER SYMPTOMS
CHILLS: 0
NEUROLOGICAL NEGATIVE: 1
DIAPHORESIS: 1
DYSPNEA ON EXERTION: 1
FEVER: 0
COUGH: 0
PALPITATIONS: 1
LIGHT-HEADEDNESS: 0
GASTROINTESTINAL NEGATIVE: 1
SHORTNESS OF BREATH: 1
HEMATOLOGIC/LYMPHATIC NEGATIVE: 1
ABDOMINAL PAIN: 0
VOMITING: 0
FREQUENCY: 0
PSYCHIATRIC NEGATIVE: 1
ABDOMINAL DISTENTION: 0
CONSTIPATION: 0
DIARRHEA: 0
EYES NEGATIVE: 1
RESPIRATORY NEGATIVE: 1
CHEST TIGHTNESS: 0
MYALGIAS: 1
FATIGUE: 1
DIZZINESS: 0
ENDOCRINE NEGATIVE: 1
NAUSEA: 0
WOUND: 1

## 2025-07-13 ASSESSMENT — COGNITIVE AND FUNCTIONAL STATUS - GENERAL
DAILY ACTIVITIY SCORE: 16
DRESSING REGULAR LOWER BODY CLOTHING: A LOT
PERSONAL GROOMING: A LITTLE
HELP NEEDED FOR BATHING: A LITTLE
WALKING IN HOSPITAL ROOM: A LOT
CLIMB 3 TO 5 STEPS WITH RAILING: A LOT
STANDING UP FROM CHAIR USING ARMS: A LOT
EATING MEALS: A LITTLE
PATIENT BASELINE BEDBOUND: NO
TOILETING: A LOT
DRESSING REGULAR UPPER BODY CLOTHING: A LITTLE
MOVING TO AND FROM BED TO CHAIR: A LITTLE
TURNING FROM BACK TO SIDE WHILE IN FLAT BAD: A LITTLE
MOBILITY SCORE: 15
MOVING FROM LYING ON BACK TO SITTING ON SIDE OF FLAT BED WITH BEDRAILS: A LITTLE

## 2025-07-13 ASSESSMENT — ACTIVITIES OF DAILY LIVING (ADL)
WALKS IN HOME: NEEDS ASSISTANCE
ADEQUATE_TO_COMPLETE_ADL: YES
FEEDING YOURSELF: INDEPENDENT
DRESSING YOURSELF: NEEDS ASSISTANCE
BATHING: NEEDS ASSISTANCE
TOILETING: NEEDS ASSISTANCE
GROOMING: NEEDS ASSISTANCE
PATIENT'S MEMORY ADEQUATE TO SAFELY COMPLETE DAILY ACTIVITIES?: YES
LACK_OF_TRANSPORTATION: NO
HEARING - LEFT EAR: DIFFICULTY WITH NOISE
HEARING - RIGHT EAR: DIFFICULTY WITH NOISE
JUDGMENT_ADEQUATE_SAFELY_COMPLETE_DAILY_ACTIVITIES: YES

## 2025-07-13 ASSESSMENT — PAIN - FUNCTIONAL ASSESSMENT
PAIN_FUNCTIONAL_ASSESSMENT: 0-10
PAIN_FUNCTIONAL_ASSESSMENT: 0-10

## 2025-07-13 ASSESSMENT — LIFESTYLE VARIABLES
HAVE PEOPLE ANNOYED YOU BY CRITICIZING YOUR DRINKING: NO
HOW OFTEN DO YOU HAVE 6 OR MORE DRINKS ON ONE OCCASION: NEVER
HOW MANY STANDARD DRINKS CONTAINING ALCOHOL DO YOU HAVE ON A TYPICAL DAY: PATIENT DOES NOT DRINK
SKIP TO QUESTIONS 9-10: 1
TOTAL SCORE: 0
EVER FELT BAD OR GUILTY ABOUT YOUR DRINKING: NO
AUDIT-C TOTAL SCORE: 0
EVER HAD A DRINK FIRST THING IN THE MORNING TO STEADY YOUR NERVES TO GET RID OF A HANGOVER: NO
HAVE YOU EVER FELT YOU SHOULD CUT DOWN ON YOUR DRINKING: NO
HOW OFTEN DO YOU HAVE A DRINK CONTAINING ALCOHOL: NEVER
AUDIT-C TOTAL SCORE: 0

## 2025-07-13 ASSESSMENT — PAIN SCALES - GENERAL
PAINLEVEL_OUTOF10: 0 - NO PAIN
PAINLEVEL_OUTOF10: 1

## 2025-07-13 ASSESSMENT — PAIN DESCRIPTION - LOCATION: LOCATION: BUTTOCKS

## 2025-07-13 NOTE — H&P
History Of Present Illness  This is a 88yo male with medical history significant for T2DM, CKD3, HTN, HLD, GERD, BPH, Hypothyroidism, Complete heart block s/p pacemaker/ICD, Vtach s/p albations and ICD, Afib presenting to [facility] after ICD shocking and feeling more fatigued.    Patient seen and examined at bedside in ED.  He reports he was doing well until 745pm last night when he was sleeping and had his ICD shock him spontaneously. Has happened before. He tried to stay up all night after this but felt more fatigued so eventually decided to come to the ED. He has some chest discomfort nonradiating on the top of chest and shoulder blades. Had some diaphoresis at time of shocking. He has palpitations intermittent asides from the shocking episode. Has baseline dyspnea not out of normal for him. Not aware of anything that contributed to the shocking just before but woman at bedside states that he isnt active normally much at all and that he was basically in bed sleeping all yesterday and she didn't make him take any of his medications. The best he ambulates is from bed up to eat and back. Says that going to the doctors appointment past week was the most activity he has had in a while. Reports no increase in weight actually tells me he losing weight from 220 to 184lb. Says he has baseline leg swelling not out of normal for him. Woman states that he has what she thinks is a bedsore on his left buttock area. No pain associated with this. He denies sick contacts, fever and chills. Reports no chest congestion and cough.     12 point Review of Systems negative unless stated above.     ED Course:   Vitals: T97.5 P145 à 70 R 14 BP88/76 à 123/85  Spo2 98%RA  Labs:  CBC: Unremarkable (WBC 6.3)  INR: 1.5  CMP: BUN (38) Cr (2.10) Ca (8.4) Alb (3.3) ALKP (148) Tbili (1.5) Tprotein (6.3)  Other: Lactate (2.0) Troponin (23 à 21) BNP (2051)  Imaging:   CXR: Patchy opacities in the bilateral mid to lower lungs are poorly evaluated in  the setting of low lung volumes and portable technique although concerning for a combination of multifocal pneumonia and atelectasis/scarring. Cardiomegaly with pulmonary interstitial edema and likely small bilateral pleural effusions concerning for superimposed congestive heart failure  Interventions: Amiodarone drip, Lasix 80mg IV    Allergies/Intolerances: [facility]    Home medications: Amiodarone 200mg BID, Eliquis 2.5mg BID, Carvedilol 25mg BID, Jardiance 25mg daily, Lasix 40mg QAM/20mg QPM, Glipizde 5mg daily, Lansoprazole 15mg PRN, Lantus 22 units QHS, Synthroid 75mcg daily, Lisinopril 10mg daily, Peroccet 5-325mg Q8hr PRN, Protonix 40mg daily, Spironolactone 25mg daily, Flomax 0.4mg QHS, Rosuvastatin 10mg daily, Mexiletine 150mg BID (not taking yet as cost too much)    Past Medical History:  T2DM, CKD3, HTN, HLD, GERD, BPH, Hypothyroidism, Complete heart block s/p pacemaker/ICD, Vtach s/p albations and ICD, Afib    Past Surgical History: Pacemaker/Defib placement, Ablation    Family History: Positive for HTN, DM, CAD ; Negative for Cancer, CVA, Asthma, HLD    Social History: Tobacco - Denies; Alcohol - Denies ; Illicit drugs - Denies    Code Status: Full Code “but doesn't want to stay on life support”     Past Medical History  He has no past medical history on file.    Surgical History  He has no past surgical history on file.     Social History  He reports that he has never smoked. He has never been exposed to tobacco smoke. He has never used smokeless tobacco. He reports that he does not drink alcohol and does not use drugs.    Family History  Family History[1]     Allergies  Penicillins    Review of Systems   Constitutional:  Positive for diaphoresis and fatigue. Negative for chills and fever.   HENT:  Positive for congestion.    Respiratory:  Positive for shortness of breath. Negative for cough and chest tightness.    Cardiovascular:  Positive for chest pain, palpitations and leg swelling.   Gastrointestinal:   Negative for abdominal distention, abdominal pain, constipation, diarrhea, nausea and vomiting.   Genitourinary:  Negative for decreased urine volume and frequency.   Skin:  Positive for wound.   Neurological:  Negative for dizziness and light-headedness.          Physical Exam  Constitutional: Pleasant, Awake/Alert/Oriented to person place and time. No distress  Head: Atraumatic, Normocephalic  Cardiovascular: Regular rate and rhythm, S1, S2. No extra heart sounds or murmurs. 2+ edema in legs chronic per patient  Respiratory: Bibasilar crackles. On oxygen in % 2L  Abdomen: Soft, Nontender. Bowel sounds appreciated  Musculoskeletal: Muscle strength grossly intact upper and lower extremities 5/5.   Neurological: . Sensation grossly intact  Extremities: Feet slightly discolored (chronic per patient). Leg swelling both legs (chronic per patient)  Psychiatric: Appropriate mood and affect       Last Recorded Vitals  /89   Pulse 71   Temp 36.4 °C (97.5 °F) (Skin)   Resp 16   Wt 83.5 kg (184 lb)   SpO2 (!) 93%     Relevant Results  Scheduled medications  Scheduled Medications[2]  Continuous medications  Continuous Medications[3]  PRN medications  PRN Medications[4]  Results for orders placed or performed during the hospital encounter of 07/13/25 (from the past 24 hours)   CBC and Auto Differential   Result Value Ref Range    WBC 6.3 4.4 - 11.3 x10*3/uL    nRBC 0.0 0.0 - 0.0 /100 WBCs    RBC 5.50 4.50 - 5.90 x10*6/uL    Hemoglobin 15.9 13.5 - 17.5 g/dL    Hematocrit 50.2 41.0 - 52.0 %    MCV 91 80 - 100 fL    MCH 28.9 26.0 - 34.0 pg    MCHC 31.7 (L) 32.0 - 36.0 g/dL    RDW 19.0 (H) 11.5 - 14.5 %    Platelets 160 150 - 450 x10*3/uL    Neutrophils % 80.9 40.0 - 80.0 %    Immature Granulocytes %, Automated 0.3 0.0 - 0.9 %    Lymphocytes % 13.6 13.0 - 44.0 %    Monocytes % 4.6 2.0 - 10.0 %    Eosinophils % 0.3 0.0 - 6.0 %    Basophils % 0.3 0.0 - 2.0 %    Neutrophils Absolute 5.07 1.60 - 5.50 x10*3/uL     Immature Granulocytes Absolute, Automated 0.02 0.00 - 0.50 x10*3/uL    Lymphocytes Absolute 0.85 0.80 - 3.00 x10*3/uL    Monocytes Absolute 0.29 0.05 - 0.80 x10*3/uL    Eosinophils Absolute 0.02 0.00 - 0.40 x10*3/uL    Basophils Absolute 0.02 0.00 - 0.10 x10*3/uL   Comprehensive metabolic panel   Result Value Ref Range    Glucose 106 (H) 74 - 99 mg/dL    Sodium 142 136 - 145 mmol/L    Potassium 4.1 3.5 - 5.3 mmol/L    Chloride 103 98 - 107 mmol/L    Bicarbonate 29 21 - 32 mmol/L    Anion Gap 14 10 - 20 mmol/L    Urea Nitrogen 38 (H) 6 - 23 mg/dL    Creatinine 2.10 (H) 0.50 - 1.30 mg/dL    eGFR 30 (L) >60 mL/min/1.73m*2    Calcium 8.4 (L) 8.6 - 10.3 mg/dL    Albumin 3.3 (L) 3.4 - 5.0 g/dL    Alkaline Phosphatase 148 (H) 33 - 136 U/L    Total Protein 6.3 (L) 6.4 - 8.2 g/dL    AST 32 9 - 39 U/L    Bilirubin, Total 1.5 (H) 0.0 - 1.2 mg/dL    ALT 22 10 - 52 U/L   Magnesium   Result Value Ref Range    Magnesium 2.39 1.60 - 2.40 mg/dL   Phosphorus   Result Value Ref Range    Phosphorus 4.4 2.5 - 4.9 mg/dL   Lactate   Result Value Ref Range    Lactate 2.0 0.4 - 2.0 mmol/L   B-Type Natriuretic Peptide   Result Value Ref Range    BNP 2,051 (H) 0 - 99 pg/mL   Coagulation Screen   Result Value Ref Range    Protime 16.2 (H) 9.8 - 12.4 seconds    INR 1.5 (H) 0.9 - 1.1    aPTT 38 (H) 26 - 36 seconds   Troponin I, High Sensitivity, Initial   Result Value Ref Range    Troponin I, High Sensitivity 23 (H) 0 - 20 ng/L   Troponin, High Sensitivity, 1 Hour   Result Value Ref Range    Troponin I, High Sensitivity 21 (H) 0 - 20 ng/L     XR chest 1 view  Result Date: 7/13/2025  Interpreted By:  Howard Krishna, STUDY: XR CHEST 1 VIEW;  7/13/2025 8:58 am   INDICATION: Signs/Symptoms:VT.     COMPARISON: 03/26/2025   ACCESSION NUMBER(S): [id number]   ORDERING CLINICIAN: TANESHA COPELAND   FINDINGS: Single AP portable radiographic view of the chest was provided.   SUPPORT DEVICES: Dual lead left subclavian cardiac rhythm maintenance device with  leads terminating overlying the right atrium and right ventricle unchanged.   CARDIOMEDIASTINAL SILHOUETTE: Marked enlargement of the cardiopericardial silhouette similar to comparison. Aortic atherosclerosis.   LUNGS: Low lung volumes. Patchy consolidative opacities in the bilateral mid to lower lungs are increased and partially obscure the right and left cardiac borders and bilateral hemidiaphragms. Pulmonary interstitial edema. No pneumothorax. Costophrenic sulci are obscured.   ABDOMEN: Grossly unremarkable.   BONES: No acute osseous abnormality.       Patchy opacities in the bilateral mid to lower lungs are poorly evaluated in the setting of low lung volumes and portable technique although concerning for a combination of multifocal pneumonia and atelectasis/scarring.   Cardiomegaly with pulmonary interstitial edema and likely small bilateral pleural effusions concerning for superimposed congestive heart failure.   MACRO: None   Signed by: Howard Krishna 7/13/2025 9:27 AM Dictation workstation:   [id number]    ECG 12 lead  Result Date: 7/2/2025  AV dual-paced rhythm Biventricular pacemaker detected Abnormal ECG When compared with ECG of 21-APR-2025 08:38, No significant change was found      Assessment/Plan:  This is a 88yo male with medical history significant for combined CHF, T2DM, CKD3, HTN, HLD, GERD, BPH, Hypothyroidism, Complete heart block s/p pacemaker/ICD, Vtach s/p albations and ICD, Afib admitted with diagnosis of ICD discharge, acute on chronic combined CHF, Interstitial edema.    Acute Medical Issues:  # Unstable ventricular tachycardia s/p ICD discharge  # Hypotension - Resolved after ICD discharge and HR return to better range  # Acute on chronic combined systolic and diastolic CHF  # Interstitial edema  - Continue Amiodarone drip as well as Eliquis  - Ordered Lasix 40mg IV Q12hr to diurese patient  - Continue rest of home GDMT including Lisinopril, Carvedilol, Jardiance, Coreg,  Spironolactone.  - Ordered the Mexiletine 150mg Q12hr but note pt not taking at home due to insurance coverage/cost. Informed by pharmacy from ED [facility] for pickup for the patient  - Cardiology consult: Appreciate recommendation  - Add on TSH with reflex level  - Telemetry  - Fluid restricted/Cardiac diet  - Strict I/Os. Daily weights  - Monitor kidney function and electrolytes while diuresing along with vitals    # Elevated creatinine on Chronic Kidney Disease  - Patient does not meet  criteria for MARIZOL as the admission creatinine is not 1.5x his baseline creatinine which is not great.  - Will continue to monitor this while diuresing along with electrolytes    # Left buttock wound - Wound care    Chronic Major Comorbidities:  # HTN: Lisionpril 10mg daily, Carvedilol 25mg BID, Spironolactone 25mg daily  # HLD: Rosuvastatin 10mg daily  # Hypothyroidism: Synthroid 75mcg daily  # DM2: Hold Glipzide. Continue Jardiance, Lantus 22units at bedtime. Cover with sliding scale as well with hypoglycemia precautions. Diabetic diet  # BPH: Flomax 0.4mg daily    DVT prophylaxis: Eliquis, SCD  Code Status: Full Code (Discussed with patient)    Disposition: Patient admitted from ED. Plan per above. Anticipated length of hospital stay greater than 2 midnights.     Time spent with hospital admission evaluation and planning approximately 85 minutes.     Epi Stephens DO         [1] No family history on file.  [2] apixaban, 2.5 mg, oral, BID  carvedilol, 25 mg, oral, BID  empagliflozin, 25 mg, oral, Daily  furosemide, 40 mg, intravenous, q12h  insulin glargine, 22 Units, subcutaneous, Nightly  insulin lispro, 0-5 Units, subcutaneous, TID AC  levothyroxine, 75 mcg, oral, Daily  lisinopril, 10 mg, oral, Daily  mexiletine, 150 mg, oral, q12h  oxygen, , inhalation, Continuous - Inhalation  rosuvastatin, 10 mg, oral, Daily  spironolactone, 25 mg, oral, q24h VENKATESH  tamsulosin, 0.4 mg, oral,  Nightly  [3] amiodarone, 1 mg/min, Last Rate: 1 mg/min (07/13/25 1045)   Followed by  amiodarone, 0.5 mg/min  [4] PRN medications: acetaminophen **OR** acetaminophen **OR** acetaminophen, acetaminophen **OR** acetaminophen **OR** acetaminophen, dextrose, dextrose, glucagon, glucagon, oxyCODONE-acetaminophen

## 2025-07-13 NOTE — CONSULTS
Consults  History Of Present Illness:    Sabino Rojas is a very pleasant 89 year old gentleman with a history of VT s/p ablation (6/27/2013, 6/6/2014 and 10/3/2014) admitted 2/2020 with VT storm and 33 ICD discharges treated with Amiodarone, CHB s/p CRT-D upgrade 7/16/2013, Atrial fibrillation s/p DCCV 10/2019, 3/28/2025 successful sinus rhythm was restored, CKD, DM, HTN, HLD, GERD, BPH, chronic systolic HFrEF due to NICM (LVEF 20-25%) and non obstructive CAD, presented to the ER after his ICD shocked him last night. In January 2025 had runs of VT and March was admitted with heart failure and had a cardioversion. He states yesterday he was tired and slept most of the day. Recently followed up with Dr. Fallon and was started on mexiletine 150 mg twice a day, states he did not start the medication due to cost. Currently on Amiodarone IV.     Review of Systems   Constitutional: Positive for malaise/fatigue.   HENT: Negative.     Eyes: Negative.    Cardiovascular:  Positive for dyspnea on exertion.   Respiratory: Negative.     Endocrine: Negative.    Hematologic/Lymphatic: Negative.    Skin: Negative.    Musculoskeletal:  Positive for muscle weakness and myalgias.   Gastrointestinal: Negative.    Neurological: Negative.    Psychiatric/Behavioral: Negative.           Last Recorded Vitals:  Vitals:    07/13/25 0900 07/13/25 0915 07/13/25 0930 07/13/25 0945   BP: 118/79 114/73 124/74 125/73   BP Location:       Patient Position:       Pulse: 73 70 70 70   Resp: 16 17 13 18   Temp:       TempSrc:       SpO2: 94% 96% 98% 99%   Weight:       Height:           Last Labs:  CBC - 7/13/2025:  8:45 AM  6.3 15.9 160    50.2      CMP - 7/13/2025:  8:45 AM  8.4 6.3 32 --- 1.5   4.4 3.3 22 148      PTT - 7/13/2025:  8:45 AM  1.5   16.2 38     Troponin I, High Sensitivity   Date/Time Value Ref Range Status   07/13/2025 08:45 AM 23 (H) 0 - 20 ng/L Final   03/26/2025 01:41 PM 23 (H) 0 - 20 ng/L Final   03/26/2025 12:38 PM 27 (H) 0 -  20 ng/L Final     BNP   Date/Time Value Ref Range Status   07/13/2025 08:45 AM 2,051 (H) 0 - 99 pg/mL Final   03/26/2025 12:38 PM 1,154 (H) 0 - 99 pg/mL Final     Hemoglobin A1C   Date/Time Value Ref Range Status   05/13/2025 11:11 AM 8.9 (H) 4.3 - 5.6 % Final   08/29/2024 06:58 PM 7.7 (H) 4.7 - 6.4 % Final     Comment:     Interpretation:     Standardized A1c  Good control or normal:  4-6% ( mg/dL avg)  Moderate control:        6.1-8.0% (120-180 mg/dL avg)  Poor control:            >8.0% (180 mg/dL avg)  With 4% as a baseline, each 1% increase = 30 mg/dL increase in average   glucose.  Taken from DCCT (Diabetes Control Complications Trial)      Last I/O:  No intake/output data recorded.    Past Cardiology Tests (Last 3 Years):  EKG:  ECG 12 lead 07/01/2025 (Preliminary) AV dual paced       Echo:  Transthoracic Echo (TTE) Complete 01/13/2025  1. The left ventricular systolic function is severely decreased, with a visually estimated ejection fraction of 20-25%.   2. There is global hypokinesis of the left ventricle with minor regional variations.   3. Left ventricular cavity size is moderately dilated.   4. There is reduced right ventricular systolic function.   5. Severely enlarged right ventricle.   6. The left atrium is severely dilated.   7. The right atrium is moderately dilated.   8. Mild to moderate mitral valve regurgitation.   9. Mild to moderate tricuspid regurgitation visualized.  10. Right ventricular systolic pressure is within normal limits.  11. There is moderate pulmonic valve regurgitation.  Ejection Fractions:  EF   Date/Time Value Ref Range Status   01/13/2025 09:30 AM 23 %      Cath:  No results found for this or any previous visit from the past 1095 days.    Stress Test:  Nuclear Stress Test 08/30/2024  1. SPECT Perfusion Study: Abnormal.    2. There is mild (<10%) ischemia in the territory of the LCX.    3. There is a small (<10%) fixed perfusion defect in the RCA territory.    4. Left  ventricle is severely dilated. The left ventricle systolic   function is moderately decreased.    5. This is an intermediate risk scan.   LVEF % 34   Cardiac Imaging:  No results found for this or any previous visit from the past 1095 days.      Past Medical History:  He has no past medical history on file.    Past Surgical History:  He has no past surgical history on file.      Social History:  He reports that he has never smoked. He has never been exposed to tobacco smoke. He has never used smokeless tobacco. He reports that he does not drink alcohol and does not use drugs.    Family History:  Family History[1]     Allergies:  Penicillins    Inpatient Medications:  Scheduled Medications[2]  PRN Medications[3]  Continuous Medications[4]  Outpatient Medications:  Current Outpatient Medications   Medication Instructions    amiodarone (PACERONE) 200 mg, oral, Daily    apixaban (ELIQUIS) 2.5 mg, oral, 2 times daily    carvedilol (COREG) 25 mg, 2 times daily    empagliflozin (JARDIANCE) 25 mg, Daily    furosemide (LASIX) 40 mg, oral, Every morning, Pt takes 40 mg in the morning and then additional 20 mg AT NIGHT for a daily total of 60 mg.    furosemide (LASIX) 20 mg, oral, Nightly, Pt takes 40 mg in the morning and then additional 20 mg AT NIGHT for a daily total of 60 mg.    glipiZIDE XL (GLUCOTROL XL) 5 mg, Daily    lansoprazole (PREVACID) 15 mg, Daily PRN    Lantus U-100 Insulin 22 Units, Nightly    levothyroxine (SYNTHROID, LEVOXYL) 75 mcg, Daily RT    lisinopril 10 mg, Daily    mexiletine (MEXITIL) 150 mg, oral, Every 12 hours    oxyCODONE-acetaminophen (Percocet) 5-325 mg tablet 1 tablet, Every 8 hours PRN    pantoprazole (PROTONIX) 40 mg, oral, Daily before breakfast, Do not crush, chew, or split.    POTASSIUM CHLORIDE ORAL 99 mg, Daily    rosuvastatin (CRESTOR) 10 mg, Daily    spironolactone (ALDACTONE) 25 mg, oral, Every 24 hours scheduled    tamsulosin (FLOMAX) 0.4 mg, Nightly       Physical Exam:  Physical  Exam  Vitals reviewed.   HENT:      Head: Normocephalic.      Nose: Nose normal.   Eyes:      Pupils: Pupils are equal, round, and reactive to light.   Cardiovascular:      Rate and Rhythm: Normal rate and regular rhythm.   Pulmonary:      Effort: Pulmonary effort is normal.      Breath sounds: Normal breath sounds.   Abdominal:      General: Abdomen is flat.      Palpations: Abdomen is soft.   Musculoskeletal:         General: Normal range of motion.      Cervical back: Normal range of motion.   Skin:     General: Skin is warm and dry.   Neurological:      General: No focal deficit present.      Mental Status: He is alert and oriented to person, place, and time.   Psychiatric:         Mood and Affect: Mood normal.       Extremities +1-2 pedal edema bilaterally      Assessment/Plan   Sabino Rojas is a very pleasant 89 year old gentleman with a history of VT s/p ablation (6/27/2013, 6/6/2014 and 10/3/2014) admitted 2/2020 with VT storm and 33 ICD discharges treated with Amiodarone, CHB s/p CRT-D upgrade 7/16/2013, Atrial fibrillation s/p DCCV 10/2019, 3/28/2025 successful sinus rhythm was restored, CKD, DM, HTN, HLD, GERD, BPH, chronic systolic HFrEF due to NICM (LVEF 20-25%) and non obstructive CAD, presented to the ER after his ICD shocked him last night. In January 2025 had runs of VT and March was admitted with heart failure and had a cardioversion. He states yesterday he was tired and slept most of the day. Recently followed up with Dr. Fallon and was started on mexiletine 150 mg twice a day, states he did not start the medication due to cost. Currently on Amiodarone IV.     Plan   -continue IV Amiodarone   -ICD interrogation tomorrow   -will need clinical pharmacy to help with the cost of the Mexiletine   -continue Eliquis 2.5 mg twice a day, Carvedilol 25 mg twice a day, Jardiance 25 mg daily, Lasix 40 mg twice a day and Lisinopril 10 mg daily   -continuous heart monitor       Peripheral IV 07/13/25 18 G  Right Antecubital (Active)   Present on Admission to Healthcare Facility No 07/13/25 0855   Site Assessment Dry;Clean 07/13/25 0855   Dressing Type Transparent 07/13/25 0855   Line Status Blood return noted;Flushed 07/13/25 0855   Dressing Status Clean;Dry 07/13/25 0855   Number of days: 0       Peripheral IV 07/13/25 22 G Anterior;Left Forearm (Active)   Present on Admission to Healthcare Facility Yes 07/13/25 0855   Site Assessment Clean;Dry;Intact 07/13/25 0855   Dressing Type Transparent 07/13/25 0855   Line Status Blood return noted;Flushed 07/13/25 0855   Dressing Status Clean;Dry 07/13/25 0855   Number of days: 0       Code Status:  Full Code    I spent  minutes in the professional and overall care of this patient.        Deborah Arnold, APRN-CNP       [1] No family history on file.  [2]   Scheduled medications   Medication Dose Route Frequency    oxygen   inhalation Continuous - Inhalation   [3]   PRN medications   Medication   [4]   Continuous Medications   Medication Dose Last Rate    amiodarone  1 mg/min 1 mg/min (07/13/25 0842)    Followed by    amiodarone  0.5 mg/min

## 2025-07-13 NOTE — ED PROVIDER NOTES
CC: No chief complaint on file.     HPI:  Patient is an 89-year-old male with a history of diabetes, atrial fibrillation on Eliquis with a pacemaker defibrillator secondary to ventricular tachycardia with a history of VT storm and VT ablations in 2013 and 2014 at [facility] presenting to the emergency department after being shocked by his defibrillator last night.  Patient states he was asymptomatic and it woke him up from his sleep with a shock.  He states he tried to go back to bed but was unsuccessful.  Since then he has been relatively asymptomatic except for shortness of breath.  He chronically has shortness of breath but feels like it is currently worse.  He denies missing any doses of his medications including his amiodarone, Eliquis or Coreg.  Patient currently denying chest pain.  When EMS arrived he was doing intermittent runs of ventricular tachycardia that were sustained for greater than 6 beats but resolved on their own without medication or intervention.    Records Reviewed:  Recent available ED and inpatient notes reviewed in EMR.    PMHx/PSHx:  Per HPI.   - has no past medical history on file.  - has no past surgical history on file.  - has SVT (supraventricular tachycardia); Ventricular tachycardia (Multi); Acute kidney injury superimposed on CKD; Acute on chronic systolic heart failure; Cardiac defibrillator in place; Cardiomyopathy, nonischemic (Multi); CHB (complete heart block); Chest pain; Chronically dry eyes, bilateral; CKD (chronic kidney disease); Heart failure with reduced ejection fraction; Hyperlipidemia; Hypokalemia; Hypothyroidism, acquired; Palpitations; Pneumonia due to infectious organism; Primary hypertension; Primary osteoarthritis of left knee; ST elevation myocardial infarction (STEMI) (Multi); Wound cellulitis; Chest pressure; Chest pain, unspecified type; Diabetes mellitus, type 2 (Multi); Pacemaker; and Anticoagulant long-term use on their problem list.    Medications:  Reviewed in  EMR. See EMR for complete list of medications and doses.    Allergies:  Penicillins    Social History:  - Tobacco:  reports that he has never smoked. He has never been exposed to tobacco smoke. He has never used smokeless tobacco.   - Alcohol:  reports no history of alcohol use.   - Illicit Drugs:  reports no history of drug use.     ROS:  Per HPI.       ???????????????????????????????????????????????????????????????  Triage Vitals:  T    HR    BP    RR    O2        Physical Exam  ???????????????????????????????????????????????????????????????  GEN: Overall well appearing, no acute distress  HEAD: atraumatic  EYES: PERRL, EOMI, no scleral icterus  ENT: mmm, no rhinorrhea, uvula midline  NECK: no JVD  CVS/CHEST: Tachycardic  PULM: Bilateral breath sounds are diminished  GI: soft, NT/ND, no rebound or guarding   EXT: bilateral LE edema, 2+ periph pulses in bilat radial and DP   NEURO: Awake and alert, Strength and sensation is equal in b/l upper and lower extremities  SKIN: warm, dry  PSYCH: AAOx3 answers questions appropriately    EKG:  As interpreted by me wide-complex tachycardia with a right axis deviation.  Left bundle janak block appreciated.  No significant ST segment elevation or depression.    Assessment and Plan:  89-year-old with a history of VT storm presents to the emergency department after his defibrillator shocked him last night.  Rhythm strip by EMS show sustained episodes of ventricular tachycardia that resolved without medication.  Started on amiodarone in the emergency department.  Converted here to a paced rhythm.  Pacemaker being interrogated.  Anticipate admission.  Discussed case with cardiology who is agreeable with the plan.  Cardiology placed on consultation.    ED Course:  Diagnoses as of 07/13/25 0847   Ventricular tachycardia (paroxysmal)       Disposition:  ***    Irlanda Moura, DO      Procedures ? [employer] last updated 7/13/2025 8:47 AM      multiple episodes slow . Under his shocking therapy of 150.  Received shock at  yesterday.  [HD]      ED Course User Index  [HD] Irlanda Moura DO         Diagnoses as of 07/14/25 0707   Ventricular tachycardia (paroxysmal)       Disposition:  admitted    Irlanda Moura DO      Critical Care    Performed by: Irlanda Moura DO  Authorized by: Irlanda Moura DO    Critical care provider statement:     Critical care time (minutes):  45    Critical care time was exclusive of:  Separately billable procedures and treating other patients and teaching time    Critical care was necessary to treat or prevent imminent or life-threatening deterioration of the following conditions:  Cardiac failure    Critical care was time spent personally by me on the following activities:  Ordering and performing treatments and interventions, ordering and review of laboratory studies, ordering and review of radiographic studies, pulse oximetry, re-evaluation of patient's condition, review of old charts, obtaining history from patient or surrogate, examination of patient, evaluation of patient's response to treatment, development of treatment plan with patient or surrogate and discussions with consultants    Care discussed with: admitting provider     ? [employer] last updated 7/13/2025 8:47 AM        Irlanda Moura DO  07/14/25 0708

## 2025-07-13 NOTE — PROGRESS NOTES
"Pharmacy Medication History Review    Valentin Rojas \"Nicanor" is a 89 y.o. male admitted for Ventricular tachycardia (paroxysmal). Pharmacy reviewed the patient's pryzl-go-mnzdwitfr medications and allergies for accuracy.    The list below reflectives the updated PTA list. Please review each medication in order reconciliation for additional clarification and justification.  Prior to Admission Medications   Prescriptions Last Dose Informant Patient Reported? Taking?   amiodarone (Pacerone) 200 mg tablet 7/11/2025 Morning  No No   Sig: Take 1 tablet (200 mg) by mouth once daily.   apixaban (Eliquis) 2.5 mg tablet 7/11/2025 Morning  No Yes   Sig: Take 1 tablet (2.5 mg) by mouth 2 times a day.   carvedilol (Coreg) 25 mg tablet 7/11/2025 Morning  Yes No   Sig: Take 1 tablet (25 mg) by mouth 2 times a day. Pt reports rx is prescribed for 3 times daily but he only takes it twice daily   empagliflozin (Jardiance) 25 mg 7/11/2025 Morning  Yes No   Sig: Take 1 tablet (25 mg) by mouth once daily.   furosemide (Lasix) 20 mg tablet 7/11/2025 Morning  No No   Sig: Take 2 tablets (40 mg) by mouth once daily in the morning. Pt takes 40 mg in the morning and then additional 20 mg AT NIGHT for a daily total of 60 mg.   Patient taking differently: Take 1 tablet (20 mg) by mouth 2 times daily (morning and late afternoon). Pt takes 40 mg in the morning and then additional 20 mg AT NIGHT for a daily total of 60 mg.   furosemide (Lasix) 20 mg tablet 7/11/2025 Morning  No No   Sig: Take 1 tablet (20 mg) by mouth once daily at bedtime. Pt takes 40 mg in the morning and then additional 20 mg AT NIGHT for a daily total of 60 mg.   glipiZIDE XL (Glucotrol XL) 5 mg 24 hr tablet 7/11/2025 Morning  Yes No   Sig: Take 1 tablet (5 mg) by mouth once daily. Do not crush, chew, or split.   insulin glargine (Lantus U-100 Insulin) 100 unit/mL injection 7/11/2025 Morning  Yes No   Sig: Inject 22 Units under the skin once daily at bedtime. Take as directed " per insulin instructions.   Pt states that he was originally prescribed 22 units at bedtime, but adjust based on his blood glucose level anywhere from 15 units to 22 units subcutaneously at bedtime.   lansoprazole (Prevacid) 15 mg DR capsule 7/11/2025 Morning  Yes No   Sig: Take 1 capsule (15 mg) by mouth once daily as needed. Do not crush or chew.  Pt currently out of medication   levothyroxine (Synthroid, Levoxyl) 75 mcg tablet 7/11/2025 Morning  Yes No   Sig: Take 1 tablet (75 mcg) by mouth once daily.   lisinopril 10 mg tablet 7/11/2025 Morning  Yes No   Sig: Take 1 tablet (10 mg) by mouth once daily.   mexiletine (Mexitil) 150 mg capsule   No No   Sig: Take 1 capsule (150 mg) by mouth every 12 hours.   oxyCODONE-acetaminophen (Percocet) 5-325 mg tablet 7/11/2025 Morning  Yes No   Sig: Take 1 tablet by mouth every 8 hours if needed for severe pain (7 - 10). Pt states he takes 1 to 2 tablets by mouth daily as needed for pain   pantoprazole (ProtoNix) 40 mg EC tablet   No No   Sig: Take 1 tablet (40 mg) by mouth once daily in the morning. Take before meals. Do not crush, chew, or split.   Patient not taking: Reported on 7/10/2025   spironolactone (Aldactone) 25 mg tablet 7/11/2025 Morning  No No   Sig: Take 1 tablet (25 mg) by mouth once every 24 hours.   tamsulosin (Flomax) 0.4 mg 24 hr capsule 7/11/2025 Morning  Yes No   Sig: Take 1 capsule (0.4 mg) by mouth once daily at bedtime.      Facility-Administered Medications: None            The list below reflectives the updated allergy list. Please review each documented allergy for additional clarification and justification.  Allergies  Reviewed by Tyrel White on 7/13/2025        Severity Reactions Comments    Penicillins High Shortness of breath, Swelling, Rash, Unknown Reviewed with patient 3/2025, reaction happened as a baby per patient (Master Pharm.D)            Below are additional concerns with the patient's PTA list.      TYREL WHITE

## 2025-07-13 NOTE — ED TRIAGE NOTES
Patient here for irregular heart beat Patient states he is here for weakness after his defibrillator shocked him last night. Upon  by EMS patient was paced on the monitor, had runs of VTACH and then back to paced. Denies CP. Endorses SOB. Patient has bilateral leg edema that he states is normal.

## 2025-07-14 VITALS
RESPIRATION RATE: 15 BRPM | DIASTOLIC BLOOD PRESSURE: 57 MMHG | SYSTOLIC BLOOD PRESSURE: 91 MMHG | BODY MASS INDEX: 24.47 KG/M2 | TEMPERATURE: 97.3 F | HEART RATE: 70 BPM | HEIGHT: 72 IN | OXYGEN SATURATION: 94 % | WEIGHT: 180.67 LBS

## 2025-07-14 LAB
ALBUMIN SERPL BCP-MCNC: 3.1 G/DL (ref 3.4–5)
ANION GAP SERPL CALC-SCNC: 13 MMOL/L (ref 10–20)
BUN SERPL-MCNC: 40 MG/DL (ref 6–23)
CALCIUM SERPL-MCNC: 8 MG/DL (ref 8.6–10.3)
CHLORIDE SERPL-SCNC: 102 MMOL/L (ref 98–107)
CO2 SERPL-SCNC: 31 MMOL/L (ref 21–32)
CREAT SERPL-MCNC: 2.18 MG/DL (ref 0.5–1.3)
EGFRCR SERPLBLD CKD-EPI 2021: 29 ML/MIN/1.73M*2
ERYTHROCYTE [DISTWIDTH] IN BLOOD BY AUTOMATED COUNT: 18.3 % (ref 11.5–14.5)
GLUCOSE BLD MANUAL STRIP-MCNC: 148 MG/DL (ref 74–99)
GLUCOSE BLD MANUAL STRIP-MCNC: 175 MG/DL (ref 74–99)
GLUCOSE BLD MANUAL STRIP-MCNC: 188 MG/DL (ref 74–99)
GLUCOSE BLD MANUAL STRIP-MCNC: 191 MG/DL (ref 74–99)
GLUCOSE SERPL-MCNC: 141 MG/DL (ref 74–99)
HCT VFR BLD AUTO: 45.9 % (ref 41–52)
HGB BLD-MCNC: 14.2 G/DL (ref 13.5–17.5)
MAGNESIUM SERPL-MCNC: 2.45 MG/DL (ref 1.6–2.4)
MCH RBC QN AUTO: 28.5 PG (ref 26–34)
MCHC RBC AUTO-ENTMCNC: 30.9 G/DL (ref 32–36)
MCV RBC AUTO: 92 FL (ref 80–100)
NRBC BLD-RTO: 0 /100 WBCS (ref 0–0)
PHOSPHATE SERPL-MCNC: 4 MG/DL (ref 2.5–4.9)
PLATELET # BLD AUTO: 149 X10*3/UL (ref 150–450)
POTASSIUM SERPL-SCNC: 3.5 MMOL/L (ref 3.5–5.3)
Q ONSET: 189 MS
QRS COUNT: 22 BEATS
QRS DURATION: 178 MS
QT INTERVAL: 406 MS
QTC CALCULATION(BAZETT): 615 MS
QTC FREDERICIA: 535 MS
R AXIS: 124 DEGREES
RBC # BLD AUTO: 4.98 X10*6/UL (ref 4.5–5.9)
SODIUM SERPL-SCNC: 142 MMOL/L (ref 136–145)
T AXIS: -64 DEGREES
T OFFSET: 392 MS
VENTRICULAR RATE: 138 BPM
WBC # BLD AUTO: 6.5 X10*3/UL (ref 4.4–11.3)

## 2025-07-14 PROCEDURE — 2500000004 HC RX 250 GENERAL PHARMACY W/ HCPCS (ALT 636 FOR OP/ED): Performed by: INTERNAL MEDICINE

## 2025-07-14 PROCEDURE — 99233 SBSQ HOSP IP/OBS HIGH 50: CPT | Performed by: INTERNAL MEDICINE

## 2025-07-14 PROCEDURE — 2500000002 HC RX 250 W HCPCS SELF ADMINISTERED DRUGS (ALT 637 FOR MEDICARE OP, ALT 636 FOR OP/ED): Performed by: INTERNAL MEDICINE

## 2025-07-14 PROCEDURE — 2500000001 HC RX 250 WO HCPCS SELF ADMINISTERED DRUGS (ALT 637 FOR MEDICARE OP): Performed by: INTERNAL MEDICINE

## 2025-07-14 PROCEDURE — 2500000005 HC RX 250 GENERAL PHARMACY W/O HCPCS: Performed by: INTERNAL MEDICINE

## 2025-07-14 PROCEDURE — 4A02X4Z MEASUREMENT OF CARDIAC ELECTRICAL ACTIVITY, EXTERNAL APPROACH: ICD-10-PCS | Performed by: FAMILY MEDICINE

## 2025-07-14 PROCEDURE — 83735 ASSAY OF MAGNESIUM: CPT | Performed by: INTERNAL MEDICINE

## 2025-07-14 PROCEDURE — 99233 SBSQ HOSP IP/OBS HIGH 50: CPT | Performed by: STUDENT IN AN ORGANIZED HEALTH CARE EDUCATION/TRAINING PROGRAM

## 2025-07-14 PROCEDURE — 2060000001 HC INTERMEDIATE ICU ROOM DAILY

## 2025-07-14 PROCEDURE — 93283 PRGRMG EVAL IMPLANTABLE DFB: CPT | Performed by: STUDENT IN AN ORGANIZED HEALTH CARE EDUCATION/TRAINING PROGRAM

## 2025-07-14 PROCEDURE — 99223 1ST HOSP IP/OBS HIGH 75: CPT | Performed by: STUDENT IN AN ORGANIZED HEALTH CARE EDUCATION/TRAINING PROGRAM

## 2025-07-14 PROCEDURE — 36415 COLL VENOUS BLD VENIPUNCTURE: CPT | Performed by: INTERNAL MEDICINE

## 2025-07-14 PROCEDURE — 80069 RENAL FUNCTION PANEL: CPT | Performed by: INTERNAL MEDICINE

## 2025-07-14 PROCEDURE — 94760 N-INVAS EAR/PLS OXIMETRY 1: CPT

## 2025-07-14 PROCEDURE — 82947 ASSAY GLUCOSE BLOOD QUANT: CPT

## 2025-07-14 PROCEDURE — 2500000002 HC RX 250 W HCPCS SELF ADMINISTERED DRUGS (ALT 637 FOR MEDICARE OP, ALT 636 FOR OP/ED): Performed by: NURSE PRACTITIONER

## 2025-07-14 PROCEDURE — 85027 COMPLETE CBC AUTOMATED: CPT | Performed by: INTERNAL MEDICINE

## 2025-07-14 RX ORDER — EAR PLUGS
1 EACH OTIC (EAR) 3 TIMES DAILY
Status: DISCONTINUED | OUTPATIENT
Start: 2025-07-14 | End: 2025-07-17 | Stop reason: HOSPADM

## 2025-07-14 RX ORDER — POTASSIUM CHLORIDE 20 MEQ/1
40 TABLET, EXTENDED RELEASE ORAL 2 TIMES DAILY
Status: COMPLETED | OUTPATIENT
Start: 2025-07-14 | End: 2025-07-14

## 2025-07-14 RX ORDER — INSULIN GLARGINE 100 [IU]/ML
11 INJECTION, SOLUTION SUBCUTANEOUS ONCE
Status: COMPLETED | OUTPATIENT
Start: 2025-07-14 | End: 2025-07-14

## 2025-07-14 RX ADMIN — AMIODARONE HYDROCHLORIDE 0.5 MG/MIN: 1.8 INJECTION, SOLUTION INTRAVENOUS at 08:58

## 2025-07-14 RX ADMIN — MEXILETINE HYDROCHLORIDE 150 MG: 150 CAPSULE ORAL at 11:21

## 2025-07-14 RX ADMIN — AMIODARONE HYDROCHLORIDE 0.5 MG/MIN: 1.8 INJECTION, SOLUTION INTRAVENOUS at 20:28

## 2025-07-14 RX ADMIN — Medication 2 L/MIN: at 08:23

## 2025-07-14 RX ADMIN — POTASSIUM CHLORIDE 40 MEQ: 20 TABLET, EXTENDED RELEASE ORAL at 20:22

## 2025-07-14 RX ADMIN — SPIRONOLACTONE 25 MG: 25 TABLET ORAL at 08:03

## 2025-07-14 RX ADMIN — INSULIN GLARGINE 11 UNITS: 100 INJECTION, SOLUTION SUBCUTANEOUS at 21:25

## 2025-07-14 RX ADMIN — FUROSEMIDE 40 MG: 10 INJECTION, SOLUTION INTRAMUSCULAR; INTRAVENOUS at 11:20

## 2025-07-14 RX ADMIN — Medication 2 L/MIN: at 20:20

## 2025-07-14 RX ADMIN — INSULIN LISPRO 1 UNITS: 100 INJECTION, SOLUTION INTRAVENOUS; SUBCUTANEOUS at 11:20

## 2025-07-14 RX ADMIN — INSULIN LISPRO 1 UNITS: 100 INJECTION, SOLUTION INTRAVENOUS; SUBCUTANEOUS at 08:03

## 2025-07-14 RX ADMIN — ROSUVASTATIN CALCIUM 10 MG: 10 TABLET, FILM COATED ORAL at 08:03

## 2025-07-14 RX ADMIN — Medication 2 L/MIN: at 09:59

## 2025-07-14 RX ADMIN — MEXILETINE HYDROCHLORIDE 150 MG: 150 CAPSULE ORAL at 22:27

## 2025-07-14 RX ADMIN — APIXABAN 2.5 MG: 5 TABLET, FILM COATED ORAL at 08:04

## 2025-07-14 RX ADMIN — APIXABAN 2.5 MG: 5 TABLET, FILM COATED ORAL at 20:23

## 2025-07-14 RX ADMIN — POTASSIUM CHLORIDE 40 MEQ: 20 TABLET, EXTENDED RELEASE ORAL at 11:21

## 2025-07-14 RX ADMIN — TAMSULOSIN HYDROCHLORIDE 0.4 MG: 0.4 CAPSULE ORAL at 20:23

## 2025-07-14 RX ADMIN — FUROSEMIDE 40 MG: 10 INJECTION, SOLUTION INTRAMUSCULAR; INTRAVENOUS at 22:26

## 2025-07-14 RX ADMIN — CARVEDILOL 25 MG: 12.5 TABLET, FILM COATED ORAL at 08:04

## 2025-07-14 RX ADMIN — LEVOTHYROXINE SODIUM 75 MCG: 0.07 TABLET ORAL at 06:27

## 2025-07-14 RX ADMIN — CARVEDILOL 25 MG: 12.5 TABLET, FILM COATED ORAL at 20:23

## 2025-07-14 ASSESSMENT — COGNITIVE AND FUNCTIONAL STATUS - GENERAL
MOVING TO AND FROM BED TO CHAIR: A LITTLE
HELP NEEDED FOR BATHING: A LITTLE
WALKING IN HOSPITAL ROOM: A LOT
DRESSING REGULAR UPPER BODY CLOTHING: A LITTLE
MOBILITY SCORE: 15
DRESSING REGULAR LOWER BODY CLOTHING: A LITTLE
STANDING UP FROM CHAIR USING ARMS: A LOT
TURNING FROM BACK TO SIDE WHILE IN FLAT BAD: A LITTLE
DAILY ACTIVITIY SCORE: 20
CLIMB 3 TO 5 STEPS WITH RAILING: A LOT
TOILETING: A LITTLE
MOVING FROM LYING ON BACK TO SITTING ON SIDE OF FLAT BED WITH BEDRAILS: A LITTLE

## 2025-07-14 ASSESSMENT — ACTIVITIES OF DAILY LIVING (ADL): LACK_OF_TRANSPORTATION: NO

## 2025-07-14 ASSESSMENT — PAIN SCALES - GENERAL
PAINLEVEL_OUTOF10: 0 - NO PAIN

## 2025-07-14 ASSESSMENT — PAIN - FUNCTIONAL ASSESSMENT: PAIN_FUNCTIONAL_ASSESSMENT: 0-10

## 2025-07-14 NOTE — PROGRESS NOTES
Subjective Data:  Denies chest pain, dyspnea     Objective Data:  Last Recorded Vitals:  Vitals:    07/14/25 0500 07/14/25 0616 07/14/25 0800 07/14/25 0807   BP: 101/69   105/86   BP Location: Right arm   Right arm   Patient Position: Lying   Lying   Pulse: 70  70 70   Resp: 23  16 21   Temp: 36.2 °C (97.2 °F)   35.9 °C (96.6 °F)   TempSrc: Tympanic   Temporal   SpO2: 99%  95% 95%   Weight:  50.7 kg (111 lb 10.6 oz)     Height:           Last Labs:  CBC - 7/14/2025:  6:18 AM  6.5 14.2 149    45.9      CMP - 7/14/2025:  6:18 AM  8.0 6.3 32 --- 1.5   4.0 3.1 22 148      PTT - 7/13/2025:  8:45 AM  1.5   16.2 38     TROPHS   Date/Time Value Ref Range Status   07/13/2025 09:41 AM 21 0 - 20 ng/L Final   07/13/2025 08:45 AM 23 0 - 20 ng/L Final   03/26/2025 01:41 PM 23 0 - 20 ng/L Final     BNP   Date/Time Value Ref Range Status   07/13/2025 08:45 AM 2,051 0 - 99 pg/mL Final   03/26/2025 12:38 PM 1,154 0 - 99 pg/mL Final     HGBA1C   Date/Time Value Ref Range Status   05/13/2025 11:11 AM 8.9 4.3 - 5.6 % Final   08/29/2024 06:58 PM 7.7 4.7 - 6.4 % Final     Comment:     Interpretation:     Standardized A1c  Good control or normal:  4-6% ( mg/dL avg)  Moderate control:        6.1-8.0% (120-180 mg/dL avg)  Poor control:            >8.0% (180 mg/dL avg)  With 4% as a baseline, each 1% increase = 30 mg/dL increase in average   glucose.  Taken from DCCT (Diabetes Control Complications Trial)      Last I/O:  I/O last 3 completed shifts:  In: 748.2 (14.8 mL/kg) [P.O.:460; I.V.:288.2 (5.7 mL/kg)]  Out: 1850 (36.5 mL/kg) [Urine:1850 (1 mL/kg/hr)]  Weight: 50.6 kg     Past Cardiology Tests (Last 3 Years):  Echo:  Transthoracic Echo (TTE) Complete 01/13/2025  1. The left ventricular systolic function is severely decreased, with a visually estimated ejection fraction of 20-25%.   2. There is global hypokinesis of the left ventricle with minor regional variations.   3. Left ventricular cavity size is moderately dilated.   4. There  is reduced right ventricular systolic function.   5. Severely enlarged right ventricle.   6. The left atrium is severely dilated.   7. The right atrium is moderately dilated.   8. Mild to moderate mitral valve regurgitation.   9. Mild to moderate tricuspid regurgitation visualized.  10. Right ventricular systolic pressure is within normal limits.  11. There is moderate pulmonic valve regurgitation.    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 Moderate sedation consisting of continuous ECG,   pulse oximetry and cardiopulmonary monitoring was performed by   the Cardiology Nurse, overseen by supervising physician, for an   intra-service time of 0 hr. 20 min.     Sedative Medications:   Drug: Versed   Dose: 1 mg   Route: IV     Drug: Fentanyl   Dose: 25 mcg   Route: IV   PRIORITY AT TIME OF PROCEDURE: Elective     SITE OF ENTRY: Radial:Right Radial     CONTRAST: Omnipaque 350 mg Iodine/mL (iohexol injection,   solution): 25 mL     ADDITIONAL PROCEDURES     CORONARY ANGIOGRAPHY:   The patient was taken to the cardiac cath lab where the entry   site was prepped and draped in a sterile manner. Under local   anesthesic with 2% Lidocaine, the vessel was cannulated with   Seldinger's technique using an arterial needle and a 6F sheath   was introduced.      Selective injections were made in the left and right coronary   arteries and various right, left and oblique views were obtained.       ADDITIONAL PROCEDURES     LEFT MAIN TRUNK: No Stenosis     LEFT ANTERIOR DESCENDIN% Stenosis     Location: Proximal      DIAGONAL #1: No Stenosis   DIAGONAL #2: No Stenosis     LEFT CIRCUMFLEX: 20% Stenosis    Location: Proximal     MARGINAL #1: No Stenosis     MARGINAL #2: No Stenosis     MARGINAL #3: No Stenosis     DOMINANT: YES   POSTERIOR DESCENDIN% Stenosis    Location: Proximal      RIGHT CORONARY: No Stenosis     DOMINANT: NO     Hemodynamics:   LVEDP: 8 mm Hg.   LV-Ao gradient : 0 mm Hg.   LVEF: Not done. LEVEF available by nuclear image. + CKD.     Stress Test:  Nuclear Stress Test 2024  1. SPECT Perfusion Study: Abnormal.    2. There is mild (<10%) ischemia in the territory of the LCX.    3. There is a small (<10%) fixed perfusion defect in the RCA territory.    4. Left ventricle is severely dilated. The left ventricle systolic   function is moderately decreased.    5. This is an intermediate risk scan.   LVEF % 34     Device Check:            Device check   2025  A Red Alert was reported by the device:   *  1 shocks for VT/VF, 0 failed.   * Alert: 1 shocks delivered for episode # 1070.   *  AT/AF>=24 h for 5 d.   * Possible OptiVol fluid accumulation: 15-May-2025 -- ongoing.   * At least 1 therapy failed in 1 VT/VF episodes.   * 1 VT episodes accelerated to VF.   * 1 monitored VT episodes.   * Ventricular sensing episodes averaged 41 minutes/day since the last session.       Inpatient Medications:  Scheduled Medications[1]  PRN Medications[2]  Continuous Medications[3]    Physical Exam  Vitals reviewed.   Constitutional:       Appearance: Normal appearance. He is ill-appearing.      Interventions: Nasal cannula in place.   HENT:      Head: Normocephalic and atraumatic.   Neck:      Vascular: JVD present. No carotid bruit.   Cardiovascular:       Rate and Rhythm: Normal rate and regular rhythm.      Pulses: Normal pulses.      Heart sounds: Normal heart sounds.      Comments: ICD left upper chest  Pulmonary:      Effort: Pulmonary effort is normal.      Breath sounds: Normal breath sounds.   Chest:      Chest wall: No tenderness.   Abdominal:      General: Abdomen is flat. Bowel sounds are normal.      Palpations: Abdomen is soft.   Musculoskeletal:      Right lower le+ Pitting Edema present.      Left lower le+ Pitting Edema present.   Skin:     General: Skin is warm and dry.   Neurological:      General: No focal deficit present.      Mental Status: He is alert and oriented to person, place, and time. Mental status is at baseline.   Psychiatric:         Mood and Affect: Mood normal.         Behavior: Behavior normal.         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 Initial EKG showed atrial fibrillation with  bpm. BNP , troponin 23, BUN 40, creatinine 2.18, K 3.5, mag 2.45. CXR with CHF. Recently followed up with Dr. Fallon and was started on mexiletine 150 mg twice a day, states he did not start the medication due to cost. Remains on IV amiodarone    VT with IC shock, s/p ablations (2013, 2014, 10/03/2014) admitted to hospital 2020 with VT storm and 33 ICD shocks)  Acute on chronic systolic heart failure  NICM  CHB: s/p CRT-D upgrade 2013 with generator change on 2018  Afib: s/p DCCV 10/2019, 2025 successful DCCV with 1 shock at 200 joules where sinus rhythm was restored  CKD3  T2DM  HTN  HLD      Plan  -TTE for structural and functional  assessment  -Device check with ICD shock for VT 7/12 1956  -Continue IV amiodarone gtt for now, plan to transition to 200 mg BID prior to discharge (was only taking 200 mg daily at home)  -Continue mexiletine 150 mg BID  -Will ask EP Dr dr Griffin to see today  -Continue IV Lasix 40 mg BID - I&O - 860 mL  -Continue Coreg 25 mg BID, Jardiance 25 mg BID, lisinopril 10 mg daily, spironolactone 25 mg daily  -Continue Eliquis 2.5 mg BID  -Continue rosuvastatin 10 mg daily  -Will follow    Peripheral IV 07/13/25 18 G Right Antecubital (Active)   Site Assessment Clean;Dry;Intact 07/14/25 0824   Dressing Status Clean;Dry;Occlusive 07/14/25 0824   Number of days: 1       Peripheral IV 07/13/25 22 G Anterior;Left Forearm (Active)   Site Assessment Clean;Dry;Intact 07/14/25 0824   Dressing Status Clean;Dry;Occlusive 07/14/25 0824   Number of days: 1       Code Status:  Full Code        Sarath Bourne, LAUREN-CNP         [1]   Scheduled medications   Medication Dose Route Frequency    apixaban  2.5 mg oral BID    carvedilol  25 mg oral BID    empagliflozin  25 mg oral Daily    furosemide  40 mg intravenous q12h    insulin glargine  22 Units subcutaneous Nightly    insulin lispro  0-5 Units subcutaneous TID AC    levothyroxine  75 mcg oral Daily    lisinopril  10 mg oral Daily    mexiletine  150 mg oral q12h    oxygen   inhalation Continuous - Inhalation    rosuvastatin  10 mg oral Daily    spironolactone  25 mg oral q24h VENKATESH    tamsulosin  0.4 mg oral Nightly   [2]   PRN medications   Medication    acetaminophen    Or    acetaminophen    Or    acetaminophen    acetaminophen    Or    acetaminophen    Or    acetaminophen    dextrose    dextrose    glucagon    glucagon    oxyCODONE-acetaminophen   [3]   Continuous Medications   Medication Dose Last Rate    amiodarone  0.5 mg/min 0.5 mg/min (07/13/25 2025)

## 2025-07-14 NOTE — PROGRESS NOTES
"Valentin Rojas \"Nicanor" is a 89 y.o. male on day 1 of admission presenting with Ventricular tachycardia (paroxysmal).      Subjective   Patient seen and examined at bedside today.  Feeling fine no issues. No more ICD discharge since admission. Says he is urinating well. Did have question about his diet (nutrition consulted).     Objective     Last Recorded Vitals  /86 (BP Location: Right arm, Patient Position: Lying)   Pulse 70   Temp 35.9 °C (96.6 °F) (Temporal)   Resp 21   Wt 50.7 kg (111 lb 10.6 oz)   SpO2 95%   Intake/Output last 3 Shifts:    Intake/Output Summary (Last 24 hours) at 7/14/2025 0933  Last data filed at 7/14/2025 0930  Gross per 24 hour   Intake 888.15 ml   Output 1850 ml   Net -961.85 ml       Admission Weight  Weight: 83.5 kg (184 lb) (07/13/25 0829)    Daily Weight  07/14/25 : 50.7 kg (111 lb 10.6 oz)    Image Results  XR chest 1 view  Narrative: Interpreted By:  Howard Krishna,   STUDY:  XR CHEST 1 VIEW;  7/13/2025 8:58 am      INDICATION:  Signs/Symptoms:VT.          COMPARISON:  03/26/2025      ACCESSION NUMBER(S):  [id number]      ORDERING CLINICIAN:  TANESHA COPELAND      FINDINGS:  Single AP portable radiographic view of the chest was provided.      SUPPORT DEVICES: Dual lead left subclavian cardiac rhythm maintenance  device with leads terminating overlying the right atrium and right  ventricle unchanged.      CARDIOMEDIASTINAL SILHOUETTE:  Marked enlargement of the cardiopericardial silhouette similar to  comparison. Aortic atherosclerosis.      LUNGS:  Low lung volumes. Patchy consolidative opacities in the bilateral mid  to lower lungs are increased and partially obscure the right and left  cardiac borders and bilateral hemidiaphragms. Pulmonary interstitial  edema. No pneumothorax. Costophrenic sulci are obscured.      ABDOMEN:  Grossly unremarkable.      BONES:  No acute osseous abnormality.      Impression: Patchy opacities in the bilateral mid to lower lungs are " poorly  evaluated in the setting of low lung volumes and portable technique  although concerning for a combination of multifocal pneumonia and  atelectasis/scarring.      Cardiomegaly with pulmonary interstitial edema and likely small  bilateral pleural effusions concerning for superimposed congestive  heart failure.      MACRO:  None      Signed by: Howard Krishna 7/13/2025 9:27 AM  Dictation workstation:   [id number]      Physical Exam  Constitutional: Pleasant, Awake/Alert/Oriented to person place and time. No distress  Head: Atraumatic, Normocephalic  Cardiovascular: Regular rate and rhythm, S1, S2. No extra heart sounds or murmurs. 2+ edema in legs chronic per patient  Respiratory: Bibasilar crackles. On oxygen in % 2L  Abdomen: Soft, Nontender. Bowel sounds appreciated  Musculoskeletal: Muscle strength grossly intact upper and lower extremities 5/5.   Neurological: . Sensation grossly intact  Extremities: Feet slightly discolored (chronic per patient). Leg swelling both legs (chronic per patient)  Psychiatric: Appropriate mood and affect    Relevant Results  Scheduled medications  Scheduled Medications[1]  Continuous medications  Continuous Medications[2]  PRN medications  PRN Medications[3]  Results for orders placed or performed during the hospital encounter of 07/13/25 (from the past 24 hours)   POCT GLUCOSE   Result Value Ref Range    POCT Glucose 148 (H) 74 - 99 mg/dL   POCT GLUCOSE   Result Value Ref Range    POCT Glucose 239 (H) 74 - 99 mg/dL   POCT GLUCOSE   Result Value Ref Range    POCT Glucose 198 (H) 74 - 99 mg/dL   CBC   Result Value Ref Range    WBC 6.5 4.4 - 11.3 x10*3/uL    nRBC 0.0 0.0 - 0.0 /100 WBCs    RBC 4.98 4.50 - 5.90 x10*6/uL    Hemoglobin 14.2 13.5 - 17.5 g/dL    Hematocrit 45.9 41.0 - 52.0 %    MCV 92 80 - 100 fL    MCH 28.5 26.0 - 34.0 pg    MCHC 30.9 (L) 32.0 - 36.0 g/dL    RDW 18.3 (H) 11.5 - 14.5 %    Platelets 149 (L) 150 - 450 x10*3/uL   Magnesium   Result Value Ref  Range    Magnesium 2.45 (H) 1.60 - 2.40 mg/dL   Renal Function Panel   Result Value Ref Range    Glucose 141 (H) 74 - 99 mg/dL    Sodium 142 136 - 145 mmol/L    Potassium 3.5 3.5 - 5.3 mmol/L    Chloride 102 98 - 107 mmol/L    Bicarbonate 31 21 - 32 mmol/L    Anion Gap 13 10 - 20 mmol/L    Urea Nitrogen 40 (H) 6 - 23 mg/dL    Creatinine 2.18 (H) 0.50 - 1.30 mg/dL    eGFR 29 (L) >60 mL/min/1.73m*2    Calcium 8.0 (L) 8.6 - 10.3 mg/dL    Phosphorus 4.0 2.5 - 4.9 mg/dL    Albumin 3.1 (L) 3.4 - 5.0 g/dL   POCT GLUCOSE   Result Value Ref Range    POCT Glucose 188 (H) 74 - 99 mg/dL       Assessment/Plan:  This is a 88yo male with medical history significant for combined CHF, T2DM, CKD3, HTN, HLD, GERD, BPH, Hypothyroidism, Complete heart block s/p pacemaker/ICD, Vtach s/p albations and ICD, Afib admitted with diagnosis of ICD discharge, acute on chronic combined CHF, Interstitial edema.     Acute Medical Issues:  # Unstable ventricular tachycardia s/p ICD discharge  # Hypotension - Resolved after ICD discharge and HR return to better range  # Acute on chronic combined systolic and diastolic CHF  # Interstitial edema  - Continue Amiodarone drip as well as Eliquis   - Continue Lasix 40mg IV Q12hr to diurese patient for now  - Continue rest of home GDMT including Carvedilol, Coreg, Spironolactone/ Holding the Lisinopril and Jardiance this morning  - Ordered the Mexiletine 150mg Q12hr but note pt not taking at home due to insurance coverage/cost. Informed by pharmacy [facility] ED [facility] for pickup for the patient  - Cardiology consult: Appreciate recommendation after evaluation today  - Telemetry  - Fluid restricted/Cardiac diet  - Strict I/Os. Daily weights  - Monitor kidney function and electrolytes while diuresing along with vitals  - Repleted potassium as K 3.5 and want to get >4     # Elevated creatinine on Chronic Kidney Disease  - Patient does not meet  criteria for MARIZOL as the  admission creatinine is not 1.5x his baseline creatinine which is not great.  - Will continue to monitor this while diuresing along with electrolytes  - Hold Lisinopril and Jardiance this morning due to this and soft BP and wanting to diurese     # Left buttock wound - Wound care    # Abnormal thyroid function testing  # Hypothyroidism  - TSH elevated and Free Thyroxine slightly elevated beyond normal  - Endocrinology consulted just to make sure dose of Synthroid (currently 75mcg daily) doesn't need to be decreased. I would think not at this time     Chronic Major Comorbidities:  # HTN:Hold Lisinopril at this time as BP soft/, Carvedilol 25mg BID, Spironolactone 25mg daily  # HLD: Rosuvastatin 10mg daily  # DM2: Hold Glipzide. Hold Jardiance this morning/ , Lantus 22units at bedtime. Cover with sliding scale as well with hypoglycemia precautions. Diabetic diet  # BPH: Flomax 0.4mg daily     DVT prophylaxis: Eliquis, SCD  Code Status: Full Code (Discussed with patient)     Disposition: Awaiting cardiology evaluation this morning. If cleared for discharge potentially can discharge home today.    Updated patient's nurse and care coordinator     Epi Stephens DO           [1] apixaban, 2.5 mg, oral, BID  carvedilol, 25 mg, oral, BID  empagliflozin, 25 mg, oral, Daily  furosemide, 40 mg, intravenous, q12h  insulin glargine, 22 Units, subcutaneous, Nightly  insulin lispro, 0-5 Units, subcutaneous, TID AC  levothyroxine, 75 mcg, oral, Daily  lisinopril, 10 mg, oral, Daily  mexiletine, 150 mg, oral, q12h  oxygen, , inhalation, Continuous - Inhalation  potassium chloride CR, 40 mEq, oral, BID  rosuvastatin, 10 mg, oral, Daily  spironolactone, 25 mg, oral, q24h VENKATESH  tamsulosin, 0.4 mg, oral, Nightly  [2] amiodarone, 0.5 mg/min, Last Rate: 0.5 mg/min (07/14/25 0858)  [3] PRN medications: acetaminophen **OR** acetaminophen **OR** acetaminophen, acetaminophen **OR** acetaminophen **OR** acetaminophen, dextrose, dextrose,  glucagon, glucagon, oxyCODONE-acetaminophen

## 2025-07-14 NOTE — CARE PLAN
Pt had uneventful night. VSS. No complaints of chest pain or sob.     The clinical goals for the shift include pt will remain HDS      Problem: Pain - Adult  Goal: Verbalizes/displays adequate comfort level or baseline comfort level  Outcome: Progressing     Problem: Safety - Adult  Goal: Free from fall injury  Outcome: Progressing     Problem: Skin  Goal: Decreased wound size/increased tissue granulation at next dressing change  7/14/2025 0433 by Mckayla Palacio RN  Outcome: Progressing  7/13/2025 2052 by Mckayla Palacio RN  Flowsheets (Taken 7/13/2025 2052)  Decreased wound size/increased tissue granulation at next dressing change: Promote sleep for wound healing  Goal: Participates in plan/prevention/treatment measures  7/14/2025 0433 by Mckayla Palacio RN  Outcome: Progressing  7/13/2025 2052 by Mckayla Palacio RN  Flowsheets (Taken 7/13/2025 2052)  Participates in plan/prevention/treatment measures: Elevate heels  Goal: Prevent/manage excess moisture  7/14/2025 0433 by Mckayla Palacio RN  Outcome: Progressing  7/13/2025 2052 by Mckayla Palacio RN  Flowsheets (Taken 7/13/2025 2052)  Prevent/manage excess moisture: Monitor for/manage infection if present  Goal: Prevent/minimize sheer/friction injuries  7/14/2025 0433 by Mckayla Palacio RN  Outcome: Progressing  7/13/2025 2052 by Mckayla Palacio RN  Flowsheets (Taken 7/13/2025 2052)  Prevent/minimize sheer/friction injuries: Increase activity/out of bed for meals  Goal: Promote/optimize nutrition  7/14/2025 0433 by Mckayla Palacio RN  Outcome: Progressing  7/13/2025 2052 by Mckayla Palacio RN  Flowsheets (Taken 7/13/2025 2052)  Promote/optimize nutrition: Monitor/record intake including meals  Goal: Promote skin healing  7/14/2025 0433 by Mckayla Palacio RN  Outcome: Progressing  7/13/2025 2052 by Mckayla Palacio RN  Flowsheets (Taken 7/13/2025 2052)  Promote skin healing: Turn/reposition every 2 hours/use positioning/transfer devices

## 2025-07-14 NOTE — CONSULTS
"Wound Care Consult     Visit Date: 7/14/2025      Patient Name: Valentin Rojas \"Ray\"         MRN: [id number]             Reason for Consult: left buttock wound        Wound History: DM2, CHF and POA on admission from \"sitting too much on that area\"     Pertinent Labs: see record      Assessment: Patient seen sitting up in bed, denies pain. Patient states he has several wounds, \"from sitting too long\" and \"the ankle wound was scabbed over until the nurse put a dressing on it now it is open\".    -- Left hip 1 x 1.7 cm 80% slough covered  also a small pin point wound above that also is slough covered.  -- Left ankle 1 x 0.7 cm wound pink wound bed partial thickness, \"my feet cross over and I scratch with them\"  -- Abdomen small 3 x 3 cm bruise with striae dark purple   -- Penis dark purple bruise  -- bilateral heels aminata ANDRE,                    Wound Plan: Moist wound healing is goal and offloading.  Zinc to protect, Medihoney, Aquacel, to autolyse, zinc to protect and heal. Waffle boots to offload, q 2 hour turns, primo fit external catheter replaced.  Order obtained, care provided, staff updated.    Juani Carranza RN, [facility]  7/14/2025  2:52 PM        "

## 2025-07-14 NOTE — CARE PLAN
The patient's goals for the shift include      The clinical goals for the shift include Patient will remain HDS throughout shift.    Over the shift, the patient did not make progress toward the following goals. Barriers to progression include . Recommendations to address these barriers include .

## 2025-07-14 NOTE — CONSULTS
Inpatient consult to Cardiology  Consult performed by: Preston Elliott MD  Consult ordered by: Epi Stephens DO  Reason for consult: ICD shock      History Of Present Illness:    Sabino Rojas is a 89 y.o. male with:     VT: s/p ablations (06/27/2013, 06/06/2014, 10/03/2014) admitted to hospital 02/2020 with VT storm and 33 ICD shocks, he was treated and discharged on amiodarone therapy.  CHB: s/p CRT-D upgrade 07/16/2013 with generator change on 07/2018  Afib: s/p DCCV 10/2019, 03/28/2025 successful DCCV with 1 shock at 200 joules where sinus rhythm was restored.  CKD3  T2DM  HTN  HLD  GERD  BPH  Hypothyroidism  CP 01/12/2025 presented at [facility] and per EMS he was having runs of VT on the monitor.  Telemetry overnight showed NSVT.  Device interrogation revealed one episode of VT treated with ATP successfully, several episodes of NSVT, 100% afib burden.     Patient was admitted with ADHF back in March 2025, underwent DC cardioversion. He is on amiodarone and GDMT, including carvedilol. His ECG from April 2025 shows  rhythm, positive V1, QRS width is 194 ms, HR 71 bpm.  He comes in today for follow-up.  He complains of shortness of breath.  His activity level is very limited due to pain on his knees.  Device transmission showed 3 episodes of VT, 1 treated with 1 shock and the other 2 treated with ATPs, all episodes in June 30 2025.  92% effective CRT pacing.  I saw the patient in clinic on July 10, 2025 and prescribed mexiletine in addition to his amiodarone.  The patient did not initiate mexiletine due to cost.    Patient came to the ED on July 13 after an ICD shock.  Device interrogation shows episode of VT treated with 4 ATP's and 1 shock.  No shocks since.  T telemetry shows ventricular paced rhythm with 1 episode of nonsustained VT today.  The patient is currently on amiodarone IV and mexiletine.  Labs from today showing potassium 3.5 sodium 142 magnesium 2.45 white blood cell count 6.5 hematocrit 45.9  hemoglobin 14.2.     Last Recorded Vitals:  Vitals:    07/14/25 0959 07/14/25 1200 07/14/25 1600 07/14/25 1625   BP:    108/71   BP Location:    Left arm   Patient Position:    Lying   Pulse:  70 70 70   Resp:  19 18 18   Temp:    36.5 °C (97.7 °F)   TempSrc:    Temporal   SpO2: 97% (!) 84% 95% 95%   Weight:       Height:           Last Labs:  CBC - 7/14/2025:  6:18 AM  6.5 14.2 149    45.9      CMP - 7/14/2025:  6:18 AM  8.0 6.3 32 --- 1.5   4.0 3.1 22 148      PTT - 7/13/2025:  8:45 AM  1.5   16.2 38     Troponin I, High Sensitivity   Date/Time Value Ref Range Status   07/13/2025 09:41 AM 21 (H) 0 - 20 ng/L Final   07/13/2025 08:45 AM 23 (H) 0 - 20 ng/L Final   03/26/2025 01:41 PM 23 (H) 0 - 20 ng/L Final     BNP   Date/Time Value Ref Range Status   07/13/2025 08:45 AM 2,051 (H) 0 - 99 pg/mL Final   03/26/2025 12:38 PM 1,154 (H) 0 - 99 pg/mL Final     Hemoglobin A1C   Date/Time Value Ref Range Status   05/13/2025 11:11 AM 8.9 (H) 4.3 - 5.6 % Final   08/29/2024 06:58 PM 7.7 (H) 4.7 - 6.4 % Final     Comment:     Interpretation:     Standardized A1c  Good control or normal:  4-6% ( mg/dL avg)  Moderate control:        6.1-8.0% (120-180 mg/dL avg)  Poor control:            >8.0% (180 mg/dL avg)  With 4% as a baseline, each 1% increase = 30 mg/dL increase in average   glucose.  Taken from DCCT (Diabetes Control Complications Trial)      Last I/O:  I/O last 3 completed shifts:  In: 748.2 (14.8 mL/kg) [P.O.:460; I.V.:288.2 (5.7 mL/kg)]  Out: 1850 (36.5 mL/kg) [Urine:1850 (1 mL/kg/hr)]  Weight: 50.6 kg     Past Cardiology Tests (Last 3 Years):  Echocardiogram  01/13/2025  PHYSICIAN INTERPRETATION:  Left Ventricle: The left ventricular systolic function is severely decreased, with a visually estimated ejection fraction of 20-25%. There is mild eccentric left ventricular hypertrophy. There is global hypokinesis of the left ventricle with minor regional variations. The left ventricular cavity size is moderately  dilated. There is mildly increased septal and normal posterior left ventricular wall thickness. Left ventricular diastolic filling cannot be determined, due to right ventricular pacing.  Left Atrium: The left atrium is severely dilated.  Right Ventricle: The right ventricle is severely enlarged. There is reduced right ventricular systolic function. A device is visualized in the right ventricle.  Right Atrium: The right atrium is moderately dilated. There is a device visualized in the right atrium.  Aortic Valve: The aortic valve is trileaflet. There is mild aortic valve cusp calcification. The aortic valve dimensionless index is 0.67. There is no evidence of aortic valve regurgitation. The peak instantaneous gradient of the aortic valve is 2 mmHg. The mean gradient of the aortic valve is 1 mmHg.  Mitral Valve: The mitral valve is mild to moderately thickened. There is mild to moderate mitral valve regurgitation.  Tricuspid Valve: The tricuspid valve is structurally normal. There is mild to moderate tricuspid regurgitation. The Doppler estimated RVSP is within normal limits at 23.2 mmHg.  Pulmonic Valve: The pulmonic valve is structurally normal. There is moderate pulmonic valve regurgitation.  Pericardium: There is no pericardial effusion noted.  Aorta: The aortic root is normal.        CONCLUSIONS:   1. The left ventricular systolic function is severely decreased, with a visually estimated ejection fraction of 20-25%.   2. There is global hypokinesis of the left ventricle with minor regional variations.   3. Left ventricular cavity size is moderately dilated.   4. There is reduced right ventricular systolic function.   5. Severely enlarged right ventricle.   6. The left atrium is severely dilated.   7. The right atrium is moderately dilated.   8. Mild to moderate mitral valve regurgitation.   9. Mild to moderate tricuspid regurgitation visualized.  10. Right ventricular systolic pressure is within normal  limits.  11. There is moderate pulmonic valve regurgitation.        Nuclear Stress Test  08/30/2024  CONCLUSIONS:    1. SPECT Perfusion Study: Abnormal.    2. There is mild (<10%) ischemia in the territory of the LCX.    3. There is a small (<10%) fixed perfusion defect in the RCA territory.    4. Left ventricle is severely dilated. The left ventricle systolic   function is moderately decreased.    5. This is an intermediate risk scan.        LVEF % 34      Past Medical History:  He has no past medical history on file.    Past Surgical History:  He has no past surgical history on file.      Social History:  He reports that he has never smoked. He has never been exposed to tobacco smoke. He has never used smokeless tobacco. He reports that he does not drink alcohol and does not use drugs.    Family History:  Family History[1]     Allergies:  Penicillins    Inpatient Medications:  Scheduled Medications[2]  PRN Medications[3]  Continuous Medications[4]  Outpatient Medications:  Current Outpatient Medications   Medication Instructions    amiodarone (PACERONE) 200 mg, oral, Daily    apixaban (ELIQUIS) 2.5 mg, oral, 2 times daily    carvedilol (COREG) 25 mg, 2 times daily    empagliflozin (JARDIANCE) 25 mg, Daily    furosemide (LASIX) 40 mg, oral, Every morning, Pt takes 40 mg in the morning and then additional 20 mg AT NIGHT for a daily total of 60 mg.    furosemide (LASIX) 20 mg, oral, Nightly, Pt takes 40 mg in the morning and then additional 20 mg AT NIGHT for a daily total of 60 mg.    glipiZIDE XL (GLUCOTROL XL) 5 mg, Daily    lansoprazole (PREVACID) 15 mg, Daily PRN    Lantus U-100 Insulin 22 Units, Nightly    levothyroxine (SYNTHROID, LEVOXYL) 75 mcg, Daily RT    lisinopril 10 mg, Daily    mexiletine (MEXITIL) 150 mg, oral, Every 12 hours    oxyCODONE-acetaminophen (Percocet) 5-325 mg tablet 1 tablet, Every 8 hours PRN    pantoprazole (PROTONIX) 40 mg, oral, Daily before breakfast, Do not crush, chew, or split.     spironolactone (ALDACTONE) 25 mg, oral, Every 24 hours scheduled    tamsulosin (FLOMAX) 0.4 mg, Nightly     ROS:  A 14 point review of systems was done and is negative other than as stated in HPI     Physical Exam  Cardiovascular:      Rate and Rhythm: Normal rate and regular rhythm.      Heart sounds: No murmur heard.     No friction rub. No gallop.   Pulmonary:      Effort: Pulmonary effort is normal.      Breath sounds: Normal breath sounds.   Abdominal:      Palpations: Abdomen is soft.   Musculoskeletal:      Cervical back: Neck supple.   Neurological:      Mental Status: He is alert.   Psychiatric:         Mood and Affect: Mood normal.         Behavior: Behavior normal.      Assessment/Plan   Patient came to the ED on July 13 after an ICD shock.  Device interrogation shows episode of VT treated with 4 ATP's and 1 shock.  No shocks since.  T telemetry shows ventricular paced rhythm with 1 episode of nonsustained VT today.  The patient is currently on amiodarone IV and mexiletine.  Labs from today showing potassium 3.5 sodium 142 magnesium 2.45 white blood cell count 6.5 hematocrit 45.9 hemoglobin 14.2.    Would recommend maintaining the patient on amiodarone and mexiletine long-term.  Please keep potassium over 4 and magnesium over 2.    Peripheral IV 07/13/25 18 G Right Antecubital (Active)   Site Assessment Clean;Dry;Intact 07/14/25 0824   Dressing Status Clean;Dry;Occlusive 07/14/25 0824   Number of days: 1       Peripheral IV 07/13/25 22 G Anterior;Left Forearm (Active)   Site Assessment Clean;Dry;Intact 07/14/25 0824   Dressing Status Clean;Dry;Occlusive 07/14/25 0824   Number of days: 1       Code Status:  Full Code      Preston Fallon MD         [1] No family history on file.  [2]   Scheduled medications   Medication Dose Route Frequency    apixaban  2.5 mg oral BID    carvedilol  25 mg oral BID    empagliflozin  25 mg oral Daily    furosemide  40 mg intravenous q12h    insulin glargine  22 Units  subcutaneous Nightly    insulin lispro  0-5 Units subcutaneous TID AC    levothyroxine  75 mcg oral Daily    lisinopril  10 mg oral Daily    mexiletine  150 mg oral q12h    oxygen   inhalation Continuous - Inhalation    potassium chloride CR  40 mEq oral BID    rosuvastatin  10 mg oral Daily    spironolactone  25 mg oral q24h VENKATESH    tamsulosin  0.4 mg oral Nightly    zinc oxide  1 Application Topical TID   [3]   PRN medications   Medication    acetaminophen    Or    acetaminophen    Or    acetaminophen    acetaminophen    Or    acetaminophen    Or    acetaminophen    dextrose    dextrose    glucagon    glucagon    oxyCODONE-acetaminophen   [4]   Continuous Medications   Medication Dose Last Rate    amiodarone  0.5 mg/min 0.5 mg/min (07/14/25 0899)

## 2025-07-14 NOTE — PROGRESS NOTES
07/14/25 1139   Discharge Planning   Living Arrangements Spouse/significant other  (Home with spouse)   Support Systems Spouse/significant other   Assistance Needed Patient is A&OX4, independent in ADLs, ambulates with a cane or walker. No O2, CPAP or Bipap at baseline. On Eliquis prior to admit. PCP Dr. Puja Barrera   Type of Residence Private residence   Number of Stairs to Enter Residence 4   Number of Stairs Within Residence 0   Do you have animals or pets at home? No   Who is requesting discharge planning? Provider   Home or Post Acute Services None   Expected Discharge Disposition Home  (Home, no needs. Patient denied any home going needs or need for HHC.)   Does the patient need discharge transport arranged? No   Financial Resource Strain   How hard is it for you to pay for the very basics like food, housing, medical care, and heating? Not hard   Housing Stability   In the last 12 months, was there a time when you were not able to pay the mortgage or rent on time? N   In the past 12 months, how many times have you moved where you were living? 0   At any time in the past 12 months, were you homeless or living in a shelter (including now)? N   Transportation Needs   In the past 12 months, has lack of transportation kept you from medical appointments or from getting medications? no   In the past 12 months, has lack of transportation kept you from meetings, work, or from getting things needed for daily living? No   Stroke Family Assessment   Stroke Family Assessment Needed No   Intensity of Service   Intensity of Service 0-30 min

## 2025-07-15 ENCOUNTER — APPOINTMENT (OUTPATIENT)
Dept: CARDIOLOGY | Facility: HOSPITAL | Age: 89
DRG: 291 | End: 2025-07-15
Payer: MEDICARE

## 2025-07-15 LAB
ALBUMIN SERPL BCP-MCNC: 3.1 G/DL (ref 3.4–5)
ANION GAP SERPL CALC-SCNC: 11 MMOL/L (ref 10–20)
BUN SERPL-MCNC: 37 MG/DL (ref 6–23)
CALCIUM SERPL-MCNC: 7.8 MG/DL (ref 8.6–10.3)
CHLORIDE SERPL-SCNC: 104 MMOL/L (ref 98–107)
CO2 SERPL-SCNC: 30 MMOL/L (ref 21–32)
CREAT SERPL-MCNC: 1.98 MG/DL (ref 0.5–1.3)
EGFRCR SERPLBLD CKD-EPI 2021: 32 ML/MIN/1.73M*2
GLUCOSE BLD MANUAL STRIP-MCNC: 142 MG/DL (ref 74–99)
GLUCOSE BLD MANUAL STRIP-MCNC: 158 MG/DL (ref 74–99)
GLUCOSE BLD MANUAL STRIP-MCNC: 165 MG/DL (ref 74–99)
GLUCOSE BLD MANUAL STRIP-MCNC: 180 MG/DL (ref 74–99)
GLUCOSE BLD MANUAL STRIP-MCNC: 191 MG/DL (ref 74–99)
GLUCOSE BLD MANUAL STRIP-MCNC: 52 MG/DL (ref 74–99)
GLUCOSE BLD MANUAL STRIP-MCNC: 57 MG/DL (ref 74–99)
GLUCOSE SERPL-MCNC: 59 MG/DL (ref 74–99)
MAGNESIUM SERPL-MCNC: 2.28 MG/DL (ref 1.6–2.4)
PHOSPHATE SERPL-MCNC: 3.1 MG/DL (ref 2.5–4.9)
POTASSIUM SERPL-SCNC: 3.6 MMOL/L (ref 3.5–5.3)
SODIUM SERPL-SCNC: 141 MMOL/L (ref 136–145)
TSH SERPL-ACNC: 11.4 MIU/L (ref 0.44–3.98)

## 2025-07-15 PROCEDURE — 2500000004 HC RX 250 GENERAL PHARMACY W/ HCPCS (ALT 636 FOR OP/ED): Performed by: NURSE PRACTITIONER

## 2025-07-15 PROCEDURE — 82947 ASSAY GLUCOSE BLOOD QUANT: CPT

## 2025-07-15 PROCEDURE — 99222 1ST HOSP IP/OBS MODERATE 55: CPT | Performed by: INTERNAL MEDICINE

## 2025-07-15 PROCEDURE — 93306 TTE W/DOPPLER COMPLETE: CPT | Performed by: STUDENT IN AN ORGANIZED HEALTH CARE EDUCATION/TRAINING PROGRAM

## 2025-07-15 PROCEDURE — 93306 TTE W/DOPPLER COMPLETE: CPT

## 2025-07-15 PROCEDURE — 2500000001 HC RX 250 WO HCPCS SELF ADMINISTERED DRUGS (ALT 637 FOR MEDICARE OP): Performed by: INTERNAL MEDICINE

## 2025-07-15 PROCEDURE — 83735 ASSAY OF MAGNESIUM: CPT | Performed by: NURSE PRACTITIONER

## 2025-07-15 PROCEDURE — 36415 COLL VENOUS BLD VENIPUNCTURE: CPT | Performed by: NURSE PRACTITIONER

## 2025-07-15 PROCEDURE — 84100 ASSAY OF PHOSPHORUS: CPT | Performed by: INTERNAL MEDICINE

## 2025-07-15 PROCEDURE — 2500000002 HC RX 250 W HCPCS SELF ADMINISTERED DRUGS (ALT 637 FOR MEDICARE OP, ALT 636 FOR OP/ED): Performed by: NURSE PRACTITIONER

## 2025-07-15 PROCEDURE — 84443 ASSAY THYROID STIM HORMONE: CPT | Performed by: INTERNAL MEDICINE

## 2025-07-15 PROCEDURE — 99233 SBSQ HOSP IP/OBS HIGH 50: CPT | Performed by: INTERNAL MEDICINE

## 2025-07-15 PROCEDURE — 2060000001 HC INTERMEDIATE ICU ROOM DAILY

## 2025-07-15 PROCEDURE — 2500000001 HC RX 250 WO HCPCS SELF ADMINISTERED DRUGS (ALT 637 FOR MEDICARE OP): Performed by: NURSE PRACTITIONER

## 2025-07-15 PROCEDURE — 99232 SBSQ HOSP IP/OBS MODERATE 35: CPT | Performed by: NURSE PRACTITIONER

## 2025-07-15 PROCEDURE — 2500000002 HC RX 250 W HCPCS SELF ADMINISTERED DRUGS (ALT 637 FOR MEDICARE OP, ALT 636 FOR OP/ED): Performed by: INTERNAL MEDICINE

## 2025-07-15 PROCEDURE — 2500000005 HC RX 250 GENERAL PHARMACY W/O HCPCS: Performed by: INTERNAL MEDICINE

## 2025-07-15 PROCEDURE — 2500000004 HC RX 250 GENERAL PHARMACY W/ HCPCS (ALT 636 FOR OP/ED): Performed by: INTERNAL MEDICINE

## 2025-07-15 RX ORDER — METOPROLOL TARTRATE 25 MG/1
25 TABLET, FILM COATED ORAL 2 TIMES DAILY
Status: DISCONTINUED | OUTPATIENT
Start: 2025-07-15 | End: 2025-07-16

## 2025-07-15 RX ORDER — POTASSIUM CHLORIDE 14.9 MG/ML
20 INJECTION INTRAVENOUS
Status: COMPLETED | OUTPATIENT
Start: 2025-07-15 | End: 2025-07-15

## 2025-07-15 RX ORDER — POTASSIUM CHLORIDE 20 MEQ/1
40 TABLET, EXTENDED RELEASE ORAL DAILY
Status: COMPLETED | OUTPATIENT
Start: 2025-07-15 | End: 2025-07-16

## 2025-07-15 RX ORDER — INSULIN GLARGINE 100 [IU]/ML
9 INJECTION, SOLUTION SUBCUTANEOUS NIGHTLY
Status: DISCONTINUED | OUTPATIENT
Start: 2025-07-15 | End: 2025-07-17 | Stop reason: HOSPADM

## 2025-07-15 RX ORDER — INSULIN GLARGINE 100 [IU]/ML
5 INJECTION, SOLUTION SUBCUTANEOUS ONCE
Status: COMPLETED | OUTPATIENT
Start: 2025-07-15 | End: 2025-07-15

## 2025-07-15 RX ADMIN — DEXTROSE MONOHYDRATE 12.5 G: 25 INJECTION, SOLUTION INTRAVENOUS at 06:45

## 2025-07-15 RX ADMIN — APIXABAN 2.5 MG: 5 TABLET, FILM COATED ORAL at 20:15

## 2025-07-15 RX ADMIN — ACETAMINOPHEN 650 MG: 325 TABLET ORAL at 21:26

## 2025-07-15 RX ADMIN — POTASSIUM CHLORIDE 20 MEQ: 14.9 INJECTION, SOLUTION INTRAVENOUS at 08:51

## 2025-07-15 RX ADMIN — Medication 1 APPLICATION: at 08:51

## 2025-07-15 RX ADMIN — INSULIN LISPRO 1 UNITS: 100 INJECTION, SOLUTION INTRAVENOUS; SUBCUTANEOUS at 11:41

## 2025-07-15 RX ADMIN — FUROSEMIDE 40 MG: 10 INJECTION, SOLUTION INTRAMUSCULAR; INTRAVENOUS at 22:14

## 2025-07-15 RX ADMIN — MEXILETINE HYDROCHLORIDE 150 MG: 150 CAPSULE ORAL at 22:14

## 2025-07-15 RX ADMIN — MEXILETINE HYDROCHLORIDE 150 MG: 150 CAPSULE ORAL at 11:42

## 2025-07-15 RX ADMIN — TAMSULOSIN HYDROCHLORIDE 0.4 MG: 0.4 CAPSULE ORAL at 20:15

## 2025-07-15 RX ADMIN — EMPAGLIFLOZIN 25 MG: 25 TABLET, FILM COATED ORAL at 11:40

## 2025-07-15 RX ADMIN — APIXABAN 2.5 MG: 5 TABLET, FILM COATED ORAL at 08:50

## 2025-07-15 RX ADMIN — HYALURONIDASE 1 ML: 150 INJECTION SUBCUTANEOUS at 11:41

## 2025-07-15 RX ADMIN — METOPROLOL TARTRATE 25 MG: 25 TABLET, FILM COATED ORAL at 20:15

## 2025-07-15 RX ADMIN — Medication 1 APPLICATION: at 20:17

## 2025-07-15 RX ADMIN — AMIODARONE HYDROCHLORIDE 0.5 MG/MIN: 1.8 INJECTION, SOLUTION INTRAVENOUS at 22:13

## 2025-07-15 RX ADMIN — INSULIN GLARGINE 5 UNITS: 100 INJECTION, SOLUTION SUBCUTANEOUS at 22:41

## 2025-07-15 RX ADMIN — LEVOTHYROXINE SODIUM 75 MCG: 0.07 TABLET ORAL at 06:03

## 2025-07-15 RX ADMIN — CARVEDILOL 25 MG: 12.5 TABLET, FILM COATED ORAL at 06:02

## 2025-07-15 RX ADMIN — POTASSIUM CHLORIDE 20 MEQ: 14.9 INJECTION, SOLUTION INTRAVENOUS at 06:26

## 2025-07-15 RX ADMIN — INSULIN LISPRO 1 UNITS: 100 INJECTION, SOLUTION INTRAVENOUS; SUBCUTANEOUS at 16:11

## 2025-07-15 RX ADMIN — ROSUVASTATIN CALCIUM 10 MG: 10 TABLET, FILM COATED ORAL at 08:50

## 2025-07-15 RX ADMIN — METOPROLOL TARTRATE 25 MG: 25 TABLET, FILM COATED ORAL at 11:41

## 2025-07-15 RX ADMIN — HYALURONIDASE 1 ML: 150 INJECTION SUBCUTANEOUS at 10:09

## 2025-07-15 RX ADMIN — AMIODARONE HYDROCHLORIDE 0.5 MG/MIN: 1.8 INJECTION, SOLUTION INTRAVENOUS at 09:57

## 2025-07-15 RX ADMIN — FUROSEMIDE 40 MG: 10 INJECTION, SOLUTION INTRAMUSCULAR; INTRAVENOUS at 11:42

## 2025-07-15 RX ADMIN — POTASSIUM CHLORIDE 40 MEQ: 20 TABLET, EXTENDED RELEASE ORAL at 11:42

## 2025-07-15 ASSESSMENT — COGNITIVE AND FUNCTIONAL STATUS - GENERAL
HELP NEEDED FOR BATHING: A LITTLE
MOVING TO AND FROM BED TO CHAIR: A LITTLE
CLIMB 3 TO 5 STEPS WITH RAILING: A LOT
DRESSING REGULAR LOWER BODY CLOTHING: A LITTLE
DRESSING REGULAR LOWER BODY CLOTHING: A LOT
HELP NEEDED FOR BATHING: A LITTLE
TOILETING: A LITTLE
MOVING TO AND FROM BED TO CHAIR: A LITTLE
MOVING FROM LYING ON BACK TO SITTING ON SIDE OF FLAT BED WITH BEDRAILS: A LITTLE
WALKING IN HOSPITAL ROOM: A LOT
PERSONAL GROOMING: A LITTLE
CLIMB 3 TO 5 STEPS WITH RAILING: A LOT
DRESSING REGULAR UPPER BODY CLOTHING: A LITTLE
MOBILITY SCORE: 15
MOVING FROM LYING ON BACK TO SITTING ON SIDE OF FLAT BED WITH BEDRAILS: A LITTLE
DAILY ACTIVITIY SCORE: 20
TURNING FROM BACK TO SIDE WHILE IN FLAT BAD: A LITTLE
TOILETING: A LITTLE
DRESSING REGULAR UPPER BODY CLOTHING: A LITTLE
TURNING FROM BACK TO SIDE WHILE IN FLAT BAD: A LITTLE
STANDING UP FROM CHAIR USING ARMS: A LOT
MOBILITY SCORE: 15
DAILY ACTIVITIY SCORE: 18
WALKING IN HOSPITAL ROOM: A LOT
STANDING UP FROM CHAIR USING ARMS: A LOT

## 2025-07-15 ASSESSMENT — ENCOUNTER SYMPTOMS
UNEXPECTED WEIGHT CHANGE: 0
ENDOCRINE COMMENTS: AS ABOVE
TREMORS: 0

## 2025-07-15 ASSESSMENT — PAIN - FUNCTIONAL ASSESSMENT
PAIN_FUNCTIONAL_ASSESSMENT: 0-10

## 2025-07-15 ASSESSMENT — PAIN SCALES - GENERAL
PAINLEVEL_OUTOF10: 3
PAINLEVEL_OUTOF10: 0 - NO PAIN

## 2025-07-15 ASSESSMENT — PAIN DESCRIPTION - DESCRIPTORS: DESCRIPTORS: HEADACHE

## 2025-07-15 NOTE — CONSULTS
"Nutrition Initial Assessment:   Nutrition Assessment    Reason for Assessment: Provider consult order    Patient is a 89 y.o. male presenting after spontaneous ICD shock for V-tach.    Admitted for acute on chronic HF w/ interstitial edema. Started amiodarone gtt with diuresis.     Medical History[1]    Nutrition History:  Food and Nutrient History: Visit made, pt resting in bed w/ wife at bedside. Reports he consistently has a good appetite & \"eats anything he can get his hands on\". Reports he consumes x3 meals/day at home with no changes in portions. Has been consistently consuming 100% of meals this admission. Denies use of any oral supplements at home or need of one here. Diet education provided. No additional needs at this time.  Vitamin/Herbal Supplement Use: Vitamin D3, Potassium  Food Allergy:  (None)     Anthropometrics:  Height: 182.9 cm (6')   Weight: 83.4 kg (183 lb 13.8 oz) (previous weight was entered in error.)   BMI (Calculated): 24.93  IBW/kg (Dietitian Calculated): 80.9 kg  Percent of IBW: 103 %    Weight History:   Wt Readings from Last 50 Encounters:   07/15/25 83.4 kg (183 lb 13.8 oz) --> Admit wt, +Edema   07/01/25 83.5 kg (184 lb)   04/21/25 75.3 kg (166 lb)   03/29/25 76.4 kg (168 lb 8 oz)   01/15/25 84.2 kg (185 lb 10 oz)     Pt reports his wife put him on a diet & cut his portion sizes which resulted in weight loss down to 164 lb but has been gaining it back over the past few months.     Weight Change %:  Weight History / % Weight Change: Weight fluctuations likely in part 2/2 fluid changes.  Significant Weight Loss: No    Nutrition Focused Physical Exam Findings:  Edema:  Edema: +3 moderate (BLE)  Physical Findings:  Skin: Positive (L hip PI, L lower abdomina wound, & L foot DM ulcer)  Digestive System Findings: Constipation, Diarrhea, Vomiting, Nausea (hx hiatal hernia)  Mouth Findings:  (None)    Nutrition Significant Labs:  BMP Trend:   Results from last 7 days   Lab Units " 07/15/25  0537 07/14/25  0618 07/13/25  0845   GLUCOSE mg/dL 59* 141* 106*   CALCIUM mg/dL 7.8* 8.0* 8.4*   SODIUM mmol/L 141 142 142   POTASSIUM mmol/L 3.6 3.5 4.1   CO2 mmol/L 30 31 29   CHLORIDE mmol/L 104 102 103   BUN mg/dL 37* 40* 38*   CREATININE mg/dL 1.98* 2.18* 2.10*      Nutrition Specific Medications:  Scheduled medications  Scheduled Medications[2]  Continuous medications  Continuous Medications[3]  PRN medications  PRN Medications[4]    I/O:   LBM: 7/15 ; Stool Appearance: Loose, Soft (07/15/25 1510)    Dietary Orders (From admission, onward)       Start     Ordered    07/13/25 1253  May Participate in Room Service  ( ROOM SERVICE MAY PARTICIPATE)  Once        Question:  .  Answer:  Yes    07/13/25 1252    07/13/25 1107  Adult diet Cardiac, Consistent Carb; CCD 75 gm/meal; 1200 mL fluid; 60 gm fat; 2 - 3 grams Sodium  Diet effective now        Question Answer Comment   Diet type Cardiac    Diet type Consistent Carb    Carb diet selection: CCD 75 gm/meal    Dietary fluid restriction / 24h: 1200 mL fluid    Fat restriction: 60 gm fat    Sodium restriction: 2 - 3 grams Sodium        07/13/25 1106             Estimated Needs:   Total Energy Estimated Needs in 24 hours (kCal):  (3150-0477)  Method for Estimating Needs: 25-28 kcals/kg x IBW  Total Protein Estimated Needs in 24 Hours (g):  ()  Method for Estimating 24 Hour Protein Needs: 1.2-1.3 g/kg x IBW  Total Fluid Estimated Needs in 24 Hours (mL):  (per team)  Method for Estimating 24 Hour Fluid Needs: per team's discretion given HF exacerbation w/ fluid restriction        Nutrition Diagnosis   Malnutrition Diagnosis  Patient has Malnutrition Diagnosis: No    Nutrition Diagnosis  Patient has Nutrition Diagnosis: Yes  Diagnosis Status (1): New  Nutrition Diagnosis 1: Increased nutrient needs  Related to (1): increased metabolic demand to promote healing  As Evidenced by (1): wounds present on admission       Nutrition Interventions/Recommendations       Nutrition Recommendations:  Consider liberalizing to a 2-3gm sodium restricted diet w/ fluid restriction per team    Obtain updated weights 2-3x/week    RDN will sign off as pt adequately meeting needs via PO intake. No further intervention indicated. Please re-consult if clinical status declines.    Nutrition Interventions/Goals:   Meals and Snacks: Mineral-modified diet  Goal: Tolerate >75% meals      Education Documentation  Heart Failure Nutrition Therapy + Carbohydrate Counting for People with Diabetes/Plate Method for Diabetes, taught by Holley Sharp, ZARINA, LD at 7/15/2025  3:23 PM.  Learner: Significant Other, Patient  Readiness: Acceptance  Method: Explanation, Handout  Response: Verbalizes Understanding      Nutrition Monitoring and Evaluation   Estimated Energy Intake: Energy intake greater or equal to 75% of estimated energy needs    Time Spent (min): 60 minutes            [1] History reviewed. No pertinent past medical history.  [2] apixaban, 2.5 mg, oral, BID  empagliflozin, 25 mg, oral, Daily  furosemide, 40 mg, intravenous, q12h  insulin glargine, 9 Units, subcutaneous, Nightly  insulin lispro, 0-5 Units, subcutaneous, TID AC  levothyroxine, 75 mcg, oral, Daily  lisinopril, 10 mg, oral, Daily  metoprolol tartrate, 25 mg, oral, BID  mexiletine, 150 mg, oral, q12h  potassium chloride CR, 40 mEq, oral, Daily  rosuvastatin, 10 mg, oral, Daily  spironolactone, 25 mg, oral, q24h VENKATESH  tamsulosin, 0.4 mg, oral, Nightly  zinc oxide, 1 Application, Topical, TID  [3] amiodarone, 0.5 mg/min, Last Rate: 0.5 mg/min (07/15/25 1343)  [4] PRN medications: acetaminophen **OR** acetaminophen **OR** acetaminophen, acetaminophen **OR** acetaminophen **OR** acetaminophen, dextrose, dextrose, glucagon, glucagon, oxyCODONE-acetaminophen

## 2025-07-15 NOTE — SIGNIFICANT EVENT
Called to assess since had 4 runs of vtach (see strips in chart). Pt is sleeping and asymptomatic. Will review morning labs and remains on amiodarone and mexiletine. He denies palpations, chest pain and dizziness.    PE:  Constitutional: In no distress. Resting in bed.  Heart: RRR, no murmer.  Lungs: CTA. On NC oxygen. 97%.    Runs of Asymptomatic V Tach-Will give coreg early. Will check electrolytes and TSH pending. Cardiology/Electrophysiology following. Potassium 3.6 and being replaced. Mg >2. Phos >3. BSG 59 and will correct. Decrease Lantus to 9 units since received 11 and still hypoglycemic,.

## 2025-07-15 NOTE — CARE PLAN
The patient's goals for the shift include      The clinical goals for the shift include patient remain out of v tach throughout shift.

## 2025-07-15 NOTE — CONSULTS
Inpatient consult to Endocrinology  Consult performed by: Rogelio Ruano MD  Consult ordered by: Epi Stephens DO      Reason For Consult  Thyroid    History Of Present Illness  Sabino Rojas is a 89 y.o. male presenting with ventricular tachycardia.    ICD discharge  Chronic amiodarone x1 year  History of atrial fibrillation     History of hypothyroidism x4 years  No change in dose  Levothyroxine 75 mcg/day  Denies goiter    TSH History   Latest Reference Range & Units 03/26/25 13:41   Thyroxine, Free 0.61 - 1.12 ng/dL 1.54 (H)   Thyroid Stimulating Hormone 0.44 - 3.98 mIU/L 6.71 (H)     TSH history from [facility]  Component 05/13/25 05/13/25 08/30/24 08/20/24 03/15/24 03/11/24   TSH 7.77 High  8.04 High  3.630  6.960 High   9.400 High   7.500 High        Past Medical History  He has no past medical history on file.    Surgical History  He has no past surgical history on file.     Social History  He reports that he has never smoked. He has never been exposed to tobacco smoke. He has never used smokeless tobacco. He reports that he does not drink alcohol and does not use drugs.    Family History  Family History[1]     Allergies  Penicillins    Review of Systems   Constitutional:  Negative for unexpected weight change.   Cardiovascular:         As above   Endocrine:        As above   Neurological:  Negative for tremors.        Physical Exam  Constitutional:       General: He is not in acute distress.  HENT:      Head: Normocephalic.      Mouth/Throat:      Mouth: Mucous membranes are moist.   Eyes:      Extraocular Movements: Extraocular movements intact.   Neck:      Thyroid: No thyromegaly.   Cardiovascular:      Pulses:           Radial pulses are 2+ on the right side and 2+ on the left side.   Lymphadenopathy:      Cervical: No cervical adenopathy.   Neurological:      Mental Status: He is alert.      Motor: No tremor.   Psychiatric:         Mood and Affect: Affect normal.            Last Recorded Vitals  Blood  pressure 113/72, pulse 70, temperature 36.3 °C (97.3 °F), temperature source Temporal, resp. rate 22, height 1.829 m (6'), weight 83.4 kg (183 lb 13.8 oz), SpO2 (!) 75%.    Relevant Results   Latest Reference Range & Units 07/13/25 08:45 07/15/25 05:37   Thyroxine, Free 0.61 - 1.12 ng/dL 1.21 (H)    Thyroid Stimulating Hormone 0.44 - 3.98 mIU/L 16.97 (H) 11.40 (H)          Assessment/Plan   Assessment & Plan  Ventricular tachycardia (paroxysmal)      HYPOTHYROIDISM  Known history of hypothyroidism  No goiter  Recent mild elevations of TSH  Free T4 measures higher due to amiodarone effect    RECOMMENDATIONS  No indication to increase thyroid hormone in the current setting, with tachyarrhythmia  Continue levothyroxine 75 mcg/day  Take levothyroxine on an empty stomach with water alone, 1 hour before eating or taking other medications, 4 hours before any calcium or iron supplement.  Repeat TSH in 4-6 weeks      Rogelio Ruano MD         [1] No family history on file.

## 2025-07-15 NOTE — SIGNIFICANT EVENT
Pt refusing to take 22 units, agrees to take 11 units. Educated by Nurse Flora RN and still refusing.

## 2025-07-15 NOTE — PROGRESS NOTES
Subjective Data:  Denies chest pain, dyspnea    Overnight Events:  Multiple episodes of NSVT noted on telemetry     Objective Data:  Last Recorded Vitals:  Vitals:    07/15/25 0400 07/15/25 0456 07/15/25 0800 07/15/25 0820   BP:  111/77  97/63   BP Location:    Left arm   Patient Position:    Lying   Pulse: 70 70 70 69   Resp: 16 17 18 18   Temp:  36 °C (96.8 °F)  36.3 °C (97.3 °F)   TempSrc:    Temporal   SpO2: 93% 94% 95% 93%   Weight:  83.4 kg (183 lb 13.8 oz)     Height:           Last Labs:  CBC - 7/14/2025:  6:18 AM  6.5 14.2 149    45.9      CMP - 7/15/2025:  5:37 AM  7.8 6.3 32 --- 1.5   3.1 3.1 22 148      PTT - 7/13/2025:  8:45 AM  1.5   16.2 38     TROPHS   Date/Time Value Ref Range Status   07/13/2025 09:41 AM 21 0 - 20 ng/L Final   07/13/2025 08:45 AM 23 0 - 20 ng/L Final   03/26/2025 01:41 PM 23 0 - 20 ng/L Final     BNP   Date/Time Value Ref Range Status   07/13/2025 08:45 AM 2,051 0 - 99 pg/mL Final   03/26/2025 12:38 PM 1,154 0 - 99 pg/mL Final     HGBA1C   Date/Time Value Ref Range Status   05/13/2025 11:11 AM 8.9 4.3 - 5.6 % Final   08/29/2024 06:58 PM 7.7 4.7 - 6.4 % Final     Comment:     Interpretation:     Standardized A1c  Good control or normal:  4-6% ( mg/dL avg)  Moderate control:        6.1-8.0% (120-180 mg/dL avg)  Poor control:            >8.0% (180 mg/dL avg)  With 4% as a baseline, each 1% increase = 30 mg/dL increase in average   glucose.  Taken from DCCT (Diabetes Control Complications Trial)      Last I/O:  I/O last 3 completed shifts:  In: 2361.8 (28.3 mL/kg) [P.O.:1180; I.V.:1181.8 (14.2 mL/kg)]  Out: 3900 (46.8 mL/kg) [Urine:3900 (1.3 mL/kg/hr)]  Weight: 83.4 kg     Past Cardiology Tests (Last 3 Years):  Echo:  Transthoracic Echo (TTE) Complete 01/13/2025  1. The left ventricular systolic function is severely decreased, with a visually estimated ejection fraction of 20-25%.   2. There is global hypokinesis of the left ventricle with minor regional variations.   3. Left  ventricular cavity size is moderately dilated.   4. There is reduced right ventricular systolic function.   5. Severely enlarged right ventricle.   6. The left atrium is severely dilated.   7. The right atrium is moderately dilated.   8. Mild to moderate mitral valve regurgitation.   9. Mild to moderate tricuspid regurgitation visualized.  10. Right ventricular systolic pressure is within normal limits.  11. There is moderate pulmonic valve regurgitation.    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 Moderate sedation consisting of continuous ECG,   pulse oximetry and cardiopulmonary monitoring was performed by   the Cardiology Nurse, overseen by supervising physician, for an   intra-service time of 0 hr. 20 min.     Sedative Medications:   Drug: Versed   Dose: 1 mg   Route: IV     Drug: Fentanyl   Dose: 25 mcg   Route: IV   PRIORITY AT TIME OF PROCEDURE: Elective     SITE OF ENTRY: Radial:Right Radial     CONTRAST: Omnipaque 350 mg Iodine/mL (iohexol injection,   solution): 25 mL     ADDITIONAL PROCEDURES     CORONARY ANGIOGRAPHY:   The patient was taken to the cardiac cath lab where the entry   site was prepped and draped in a sterile manner. Under local   anesthesic with 2% Lidocaine, the vessel was cannulated with   Seldinger's technique using an  arterial needle and a 6F sheath   was introduced.     Selective injections were made in the left and right coronary   arteries and various right, left and oblique views were obtained.       ADDITIONAL PROCEDURES     LEFT MAIN TRUNK: No Stenosis     LEFT ANTERIOR DESCENDIN% Stenosis     Location: Proximal      DIAGONAL #1: No Stenosis   DIAGONAL #2: No Stenosis     LEFT CIRCUMFLEX: 20% Stenosis    Location: Proximal     MARGINAL #1: No Stenosis     MARGINAL #2: No Stenosis     MARGINAL #3: No Stenosis     DOMINANT: YES   POSTERIOR DESCENDIN% Stenosis    Location: Proximal      RIGHT CORONARY: No Stenosis     DOMINANT: NO     Hemodynamics:   LVEDP: 8 mm Hg.   LV-Ao gradient : 0 mm Hg.   LVEF: Not done. LEVEF available by nuclear image. + CKD.     Stress Test:  Nuclear Stress Test 2024  1. SPECT Perfusion Study: Abnormal.    2. There is mild (<10%) ischemia in the territory of the LCX.    3. There is a small (<10%) fixed perfusion defect in the RCA territory.    4. Left ventricle is severely dilated. The left ventricle systolic   function is moderately decreased.    5. This is an intermediate risk scan.   LVEF % 34     Device Check:            Device check   2025  A Red Alert was reported by the device:   *  1 shocks for VT/VF, 0 failed.   * Alert: 1 shocks delivered for episode # 1070.   *  AT/AF>=24 h for 5 d.   * Possible OptiVol fluid accumulation: 15-May-2025 -- ongoing.   * At least 1 therapy failed in 1 VT/VF episodes.   * 1 VT episodes accelerated to VF.   * 1 monitored VT episodes.   * Ventricular sensing episodes averaged 41 minutes/day since the last session.       Inpatient Medications:  Scheduled Medications[1]  PRN Medications[2]  Continuous Medications[3]    Physical Exam  Vitals reviewed.   Constitutional:       Appearance: Normal appearance. He is ill-appearing.      Interventions: Nasal cannula in place.   HENT:      Head: Normocephalic and atraumatic.   Neck:      Vascular: JVD  present. No carotid bruit.   Cardiovascular:      Rate and Rhythm: Normal rate and regular rhythm.      Pulses: Normal pulses.      Heart sounds: Normal heart sounds.      Comments: ICD left upper chest  Pulmonary:      Effort: Pulmonary effort is normal.      Breath sounds: Normal breath sounds.   Chest:      Chest wall: No tenderness.   Abdominal:      General: Abdomen is flat. Bowel sounds are normal.      Palpations: Abdomen is soft.   Musculoskeletal:      Right lower le+ Pitting Edema present.      Left lower le+ Pitting Edema present.   Skin:     General: Skin is warm and dry.   Neurological:      General: No focal deficit present.      Mental Status: He is alert and oriented to person, place, and time. Mental status is at baseline.   Psychiatric:         Mood and Affect: Mood normal.         Behavior: Behavior normal.         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 Initial EKG showed atrial fibrillation with  bpm. BNP , troponin 23, BUN 40, creatinine 2.18, K 3.5, mag 2.45. CXR with CHF. Recently followed up with Dr. Fallon and was started on mexiletine 150 mg twice a day, states he did not start the medication due to cost. Remains on IV amiodarone    VT with IC shock, s/p ablations (2013, 2014, 10/03/2014) admitted to hospital 2020 with VT storm and 33 ICD shocks)  Acute on chronic systolic heart failure  NICM  CHB: s/p CRT-D upgrade 2013 with generator change on 2018  Afib: s/p DCCV 10/2019, 2025 successful DCCV with 1 shock at 200 joules where sinus rhythm was  restored  CKD3  T2DM  HTN  HLD      Plan  -TTE for structural and functional assessment  -Device check with ICD shock for VT 7/12 1956  -Continue IV amiodarone gtt for now, plan to transition to 200 mg BID prior to discharge (was only taking 200 mg daily at home)  -Continue mexiletine 150 mg BID  -Per EP - continue amiodarone and mexiletine, Keep K >4, mag >2  -I&O -1L  -Resume Lasix 40 mg IV BID, spironolactone 25 mg daily, lisinopril 10 mg daily  -Stop carvedilol 25 mg BID, begin metoprolol tartrate 25 mg BID (plan to switch to succinate prior to discharge)  -Continue Jardiance 25 mg daily  -Continue Eliquis 2.5 mg BID  -Continue rosuvastatin 10 mg daily  -Will follow    Peripheral IV 07/13/25 18 G Right Antecubital (Active)   Site Assessment Clean;Dry;Intact 07/14/25 0824   Dressing Status Clean;Dry;Occlusive 07/14/25 0824   Number of days: 1       Peripheral IV 07/13/25 22 G Anterior;Left Forearm (Active)   Site Assessment Clean;Dry;Intact 07/14/25 0824   Dressing Status Clean;Dry;Occlusive 07/14/25 0824   Number of days: 1       Code Status:  Full Code        LAUREN Mon-DEBBY           [1]   Scheduled medications   Medication Dose Route Frequency    apixaban  2.5 mg oral BID    carvedilol  25 mg oral BID    empagliflozin  25 mg oral Daily    furosemide  40 mg intravenous q12h    insulin glargine  9 Units subcutaneous Nightly    insulin lispro  0-5 Units subcutaneous TID AC    levothyroxine  75 mcg oral Daily    lisinopril  10 mg oral Daily    mexiletine  150 mg oral q12h    oxygen   inhalation Continuous - Inhalation    potassium chloride  20 mEq intravenous q2h    rosuvastatin  10 mg oral Daily    spironolactone  25 mg oral q24h VENKATESH    tamsulosin  0.4 mg oral Nightly    zinc oxide  1 Application Topical TID   [2]   PRN medications   Medication    acetaminophen    Or    acetaminophen    Or    acetaminophen    acetaminophen    Or    acetaminophen    Or    acetaminophen    dextrose    dextrose     glucagon    glucagon    oxyCODONE-acetaminophen   [3]   Continuous Medications   Medication Dose Last Rate    amiodarone  0.5 mg/min 0.5 mg/min (07/14/25 2028)

## 2025-07-15 NOTE — PROGRESS NOTES
"Valentin Rojas \"Nicanor" is a 89 y.o. male on day 2 of admission presenting with Ventricular tachycardia (paroxysmal).      Subjective   Patient seen and examined at bedside today.  He feels fine. He wasn't aware of him having runs of Vtach overnight as he had no symptoms at time. No chest pain or dyspnea. Wants his Lantus cut back down. Doesn't want to leave until everything taken care of.     Objective     Last Recorded Vitals  BP 97/63 (BP Location: Left arm, Patient Position: Lying)   Pulse 69   Temp 36.3 °C (97.3 °F) (Temporal)   Resp 18   Wt 83.4 kg (183 lb 13.8 oz) Comment: previous weight was entered in error.  SpO2 93%   Intake/Output last 3 Shifts:    Intake/Output Summary (Last 24 hours) at 7/15/2025 1033  Last data filed at 7/15/2025 0957  Gross per 24 hour   Intake 2706.6 ml   Output 2700 ml   Net 6.6 ml       Admission Weight  Weight: 83.5 kg (184 lb) (07/13/25 0829)    Daily Weight  07/15/25 : 83.4 kg (183 lb 13.8 oz)    Image Results  ECG 12 lead  Atrial fibrillation with rapid ventricular response with premature ventricular or aberrantly conducted complexes  Right axis deviation  Left bundle branch block  Abnormal ECG  When compared with ECG of 01-JUL-2025 16:20, (unconfirmed)  Atrial fibrillation has replaced Electronic ventricular pacemaker  Vent. rate has increased BY  66 BPM      Physical Exam  Constitutional: Pleasant, Awake/Alert/Oriented to person place and time. No distress  Head: Atraumatic, Normocephalic  Cardiovascular: Ventricular paced rhythm, S1, S2. No extra heart sounds or murmurs. Still with edema in his legs appears unchanged to me  Respiratory: Bibasilar crackles have resolved. On oxygen in % 2L  Abdomen: Soft, Nontender. Bowel sounds appreciated  Extremities: Leg swelling still  Psychiatric: Appropriate mood and affect    Relevant Results  Scheduled medications  Scheduled Medications[1]  Continuous medications  Continuous Medications[2]  PRN medications  PRN " Medications[3]  Results for orders placed or performed during the hospital encounter of 07/13/25 (from the past 24 hours)   POCT GLUCOSE   Result Value Ref Range    POCT Glucose 175 (H) 74 - 99 mg/dL   POCT GLUCOSE   Result Value Ref Range    POCT Glucose 148 (H) 74 - 99 mg/dL   POCT GLUCOSE   Result Value Ref Range    POCT Glucose 191 (H) 74 - 99 mg/dL   Renal Function Panel   Result Value Ref Range    Glucose 59 (L) 74 - 99 mg/dL    Sodium 141 136 - 145 mmol/L    Potassium 3.6 3.5 - 5.3 mmol/L    Chloride 104 98 - 107 mmol/L    Bicarbonate 30 21 - 32 mmol/L    Anion Gap 11 10 - 20 mmol/L    Urea Nitrogen 37 (H) 6 - 23 mg/dL    Creatinine 1.98 (H) 0.50 - 1.30 mg/dL    eGFR 32 (L) >60 mL/min/1.73m*2    Calcium 7.8 (L) 8.6 - 10.3 mg/dL    Phosphorus 3.1 2.5 - 4.9 mg/dL    Albumin 3.1 (L) 3.4 - 5.0 g/dL   TSH   Result Value Ref Range    Thyroid Stimulating Hormone 11.40 (H) 0.44 - 3.98 mIU/L   Magnesium   Result Value Ref Range    Magnesium 2.28 1.60 - 2.40 mg/dL   POCT GLUCOSE   Result Value Ref Range    POCT Glucose 52 (L) 74 - 99 mg/dL   POCT GLUCOSE   Result Value Ref Range    POCT Glucose 57 (L) 74 - 99 mg/dL   POCT GLUCOSE   Result Value Ref Range    POCT Glucose 142 (H) 74 - 99 mg/dL     ECG 12 lead  Result Date: 7/14/2025  Atrial fibrillation with rapid ventricular response with premature ventricular or aberrantly conducted complexes Right axis deviation Left bundle branch block Abnormal ECG When compared with ECG of 01-JUL-2025 16:20, (unconfirmed) Atrial fibrillation has replaced Electronic ventricular pacemaker Vent. rate has increased BY  66 BPM    XR chest 1 view  Result Date: 7/13/2025  Interpreted By:  Howard Krishna, STUDY: XR CHEST 1 VIEW;  7/13/2025 8:58 am   INDICATION: Signs/Symptoms:VT.     COMPARISON: 03/26/2025   ACCESSION NUMBER(S): [id number]   ORDERING CLINICIAN: TANESHA COPELAND   FINDINGS: Single AP portable radiographic view of the chest was provided.   SUPPORT DEVICES: Dual lead left  subclavian cardiac rhythm maintenance device with leads terminating overlying the right atrium and right ventricle unchanged.   CARDIOMEDIASTINAL SILHOUETTE: Marked enlargement of the cardiopericardial silhouette similar to comparison. Aortic atherosclerosis.   LUNGS: Low lung volumes. Patchy consolidative opacities in the bilateral mid to lower lungs are increased and partially obscure the right and left cardiac borders and bilateral hemidiaphragms. Pulmonary interstitial edema. No pneumothorax. Costophrenic sulci are obscured.   ABDOMEN: Grossly unremarkable.   BONES: No acute osseous abnormality.       Patchy opacities in the bilateral mid to lower lungs are poorly evaluated in the setting of low lung volumes and portable technique although concerning for a combination of multifocal pneumonia and atelectasis/scarring.   Cardiomegaly with pulmonary interstitial edema and likely small bilateral pleural effusions concerning for superimposed congestive heart failure.   MACRO: None   Signed by: Howard Krishna 7/13/2025 9:27 AM Dictation workstation:   [id number]    ECG 12 lead  Result Date: 7/2/2025  AV dual-paced rhythm Biventricular pacemaker detected Abnormal ECG When compared with ECG of 21-APR-2025 08:38, No significant change was found      Assessment/Plan:  This is a 88yo male with medical history significant for combined CHF, T2DM, CKD3, HTN, HLD, GERD, BPH, Hypothyroidism, Complete heart block s/p pacemaker/ICD, Vtach s/p albations and ICD, Afib admitted with diagnosis of ICD discharge, acute on chronic combined CHF, Interstitial edema.     Acute Medical Issues:  # Unstable ventricular tachycardia s/p ICD discharge  # Hypotension - Resolved after ICD discharge and HR return to better range  # Acute on chronic combined systolic and diastolic CHF  # Interstitial edema  # Soft BP  - Patient having runs of Vtach overnight 7/14 into 7/15  - Continue Amiodarone drip as well as Eliquis   - Holding Carvedilol,  Lasix, Lisinopril and Aldactone due to soft BP due to concern will become hypotensive if given  - Cardiology consult: Appreciate recommendation after evaluation. I updated them on vitals and for recs when known  - Telemetry  - Fluid restricted/Cardiac diet  - Strict I/Os. 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 Asymptomatic.  - He says he has been taking 11 units of Lantus at home. 22units was a previously ordered dose.  - Reduced dose to 9units last night due to hypoglycemia.  - If happens again, would hold the Lantus and just do sliding scale temporarily to cover VS reduce the dose (To be determined if needed)     # Elevated creatinine on Chronic Kidney Disease  - Patient does not meet  criteria for MARIZOL as the admission creatinine is not 1.5x his baseline creatinine which is not great.  - Improved today. Continue to monitor  - See above meds holding due to soft BP    # Left buttock wound - Wound care     # Abnormal thyroid function testing  # Hypothyroidism  - TSH elevated and Free Thyroxine slightly elevated beyond normal  - Endocrinology consulted: Appreciate recs today when made. Yesterday recommended repeat TSH which is improved from prior     Chronic Major Comorbidities:  # HTN: Holding BP meds as stated above  # HLD: Rosuvastatin 10mg daily  # BPH: Flomax 0.4mg daily     DVT prophylaxis: Eliquis, SCD  Code Status: Full Code (Discussed with patient)     Disposition: Cardiology following for updated recommendations. Having runs of Vtach overnight so not medically optimized for discharge at this time. Plan per above.     Updated patient's nurse and care coordinator       Epi Stephens DO           [1] apixaban, 2.5 mg, oral, BID  [Held by provider] carvedilol, 25 mg, oral, BID  empagliflozin,  25 mg, oral, Daily  [Held by provider] furosemide, 40 mg, intravenous, q12h  hyaluronidase, 1 mL, infiltration, Once  insulin glargine, 9 Units, subcutaneous, Nightly  insulin lispro, 0-5 Units, subcutaneous, TID AC  levothyroxine, 75 mcg, oral, Daily  [Held by provider] lisinopril, 10 mg, oral, Daily  mexiletine, 150 mg, oral, q12h  potassium chloride, 20 mEq, intravenous, q2h  rosuvastatin, 10 mg, oral, Daily  [Held by provider] spironolactone, 25 mg, oral, q24h VENKATESH  tamsulosin, 0.4 mg, oral, Nightly  zinc oxide, 1 Application, Topical, TID  [2] amiodarone, 0.5 mg/min, Last Rate: 0.5 mg/min (07/15/25 0957)  [3] PRN medications: acetaminophen **OR** acetaminophen **OR** acetaminophen, acetaminophen **OR** acetaminophen **OR** acetaminophen, dextrose, dextrose, glucagon, glucagon, oxyCODONE-acetaminophen

## 2025-07-16 LAB
ALBUMIN SERPL BCP-MCNC: 3 G/DL (ref 3.4–5)
ANION GAP SERPL CALC-SCNC: 11 MMOL/L (ref 10–20)
AORTIC VALVE PEAK VELOCITY: 0.87 M/S
AV PEAK GRADIENT: 3 MMHG
AVA (PEAK VEL): 2.54 CM2
BUN SERPL-MCNC: 37 MG/DL (ref 6–23)
CALCIUM SERPL-MCNC: 7.9 MG/DL (ref 8.6–10.3)
CHLORIDE SERPL-SCNC: 105 MMOL/L (ref 98–107)
CO2 SERPL-SCNC: 30 MMOL/L (ref 21–32)
CREAT SERPL-MCNC: 1.96 MG/DL (ref 0.5–1.3)
EGFRCR SERPLBLD CKD-EPI 2021: 32 ML/MIN/1.73M*2
EJECTION FRACTION APICAL 4 CHAMBER: 29.4
EJECTION FRACTION: 23 %
ERYTHROCYTE [DISTWIDTH] IN BLOOD BY AUTOMATED COUNT: 18.5 % (ref 11.5–14.5)
GLUCOSE BLD MANUAL STRIP-MCNC: 166 MG/DL (ref 74–99)
GLUCOSE BLD MANUAL STRIP-MCNC: 178 MG/DL (ref 74–99)
GLUCOSE BLD MANUAL STRIP-MCNC: 214 MG/DL (ref 74–99)
GLUCOSE BLD MANUAL STRIP-MCNC: 80 MG/DL (ref 74–99)
GLUCOSE SERPL-MCNC: 76 MG/DL (ref 74–99)
HCT VFR BLD AUTO: 47 % (ref 41–52)
HGB BLD-MCNC: 14.7 G/DL (ref 13.5–17.5)
LEFT ATRIUM VOLUME AREA LENGTH INDEX BSA: 66.6 ML/M2
LEFT VENTRICLE INTERNAL DIMENSION DIASTOLE: 5.36 CM (ref 3.5–6)
LEFT VENTRICULAR OUTFLOW TRACT DIAMETER: 2.23 CM
MAGNESIUM SERPL-MCNC: 2.3 MG/DL (ref 1.6–2.4)
MCH RBC QN AUTO: 28.8 PG (ref 26–34)
MCHC RBC AUTO-ENTMCNC: 31.3 G/DL (ref 32–36)
MCV RBC AUTO: 92 FL (ref 80–100)
MITRAL VALVE E/A RATIO: 1.61
NRBC BLD-RTO: 0 /100 WBCS (ref 0–0)
PHOSPHATE SERPL-MCNC: 3.1 MG/DL (ref 2.5–4.9)
PLATELET # BLD AUTO: 125 X10*3/UL (ref 150–450)
POTASSIUM SERPL-SCNC: 4 MMOL/L (ref 3.5–5.3)
RBC # BLD AUTO: 5.11 X10*6/UL (ref 4.5–5.9)
RIGHT VENTRICLE FREE WALL PEAK S': 11 CM/S
RIGHT VENTRICLE PEAK SYSTOLIC PRESSURE: 32 MMHG
SODIUM SERPL-SCNC: 142 MMOL/L (ref 136–145)
TRICUSPID ANNULAR PLANE SYSTOLIC EXCURSION: 2.4 CM
WBC # BLD AUTO: 4.2 X10*3/UL (ref 4.4–11.3)

## 2025-07-16 PROCEDURE — 36415 COLL VENOUS BLD VENIPUNCTURE: CPT | Performed by: INTERNAL MEDICINE

## 2025-07-16 PROCEDURE — 2500000002 HC RX 250 W HCPCS SELF ADMINISTERED DRUGS (ALT 637 FOR MEDICARE OP, ALT 636 FOR OP/ED): Performed by: INTERNAL MEDICINE

## 2025-07-16 PROCEDURE — 97161 PT EVAL LOW COMPLEX 20 MIN: CPT | Mod: GP

## 2025-07-16 PROCEDURE — 2500000004 HC RX 250 GENERAL PHARMACY W/ HCPCS (ALT 636 FOR OP/ED): Performed by: INTERNAL MEDICINE

## 2025-07-16 PROCEDURE — 2060000001 HC INTERMEDIATE ICU ROOM DAILY

## 2025-07-16 PROCEDURE — 97530 THERAPEUTIC ACTIVITIES: CPT | Mod: GP

## 2025-07-16 PROCEDURE — 82947 ASSAY GLUCOSE BLOOD QUANT: CPT

## 2025-07-16 PROCEDURE — 2500000001 HC RX 250 WO HCPCS SELF ADMINISTERED DRUGS (ALT 637 FOR MEDICARE OP): Performed by: NURSE PRACTITIONER

## 2025-07-16 PROCEDURE — 85027 COMPLETE CBC AUTOMATED: CPT | Performed by: INTERNAL MEDICINE

## 2025-07-16 PROCEDURE — 2500000004 HC RX 250 GENERAL PHARMACY W/ HCPCS (ALT 636 FOR OP/ED): Performed by: NURSE PRACTITIONER

## 2025-07-16 PROCEDURE — 99232 SBSQ HOSP IP/OBS MODERATE 35: CPT | Performed by: FAMILY MEDICINE

## 2025-07-16 PROCEDURE — 80069 RENAL FUNCTION PANEL: CPT | Performed by: INTERNAL MEDICINE

## 2025-07-16 PROCEDURE — 99232 SBSQ HOSP IP/OBS MODERATE 35: CPT | Performed by: NURSE PRACTITIONER

## 2025-07-16 PROCEDURE — 83735 ASSAY OF MAGNESIUM: CPT | Performed by: FAMILY MEDICINE

## 2025-07-16 PROCEDURE — 2500000002 HC RX 250 W HCPCS SELF ADMINISTERED DRUGS (ALT 637 FOR MEDICARE OP, ALT 636 FOR OP/ED): Performed by: NURSE PRACTITIONER

## 2025-07-16 PROCEDURE — 2500000001 HC RX 250 WO HCPCS SELF ADMINISTERED DRUGS (ALT 637 FOR MEDICARE OP): Performed by: INTERNAL MEDICINE

## 2025-07-16 RX ORDER — METOPROLOL SUCCINATE 50 MG/1
50 TABLET, EXTENDED RELEASE ORAL DAILY
Status: DISCONTINUED | OUTPATIENT
Start: 2025-07-17 | End: 2025-07-17 | Stop reason: HOSPADM

## 2025-07-16 RX ORDER — FUROSEMIDE 40 MG/1
40 TABLET ORAL
Status: DISCONTINUED | OUTPATIENT
Start: 2025-07-16 | End: 2025-07-17 | Stop reason: HOSPADM

## 2025-07-16 RX ORDER — AMIODARONE HYDROCHLORIDE 200 MG/1
200 TABLET ORAL 2 TIMES DAILY
Status: DISCONTINUED | OUTPATIENT
Start: 2025-07-16 | End: 2025-07-17 | Stop reason: HOSPADM

## 2025-07-16 RX ADMIN — AMIODARONE HYDROCHLORIDE 200 MG: 200 TABLET ORAL at 20:23

## 2025-07-16 RX ADMIN — ROSUVASTATIN CALCIUM 10 MG: 10 TABLET, FILM COATED ORAL at 08:31

## 2025-07-16 RX ADMIN — APIXABAN 2.5 MG: 5 TABLET, FILM COATED ORAL at 08:27

## 2025-07-16 RX ADMIN — POTASSIUM CHLORIDE 40 MEQ: 20 TABLET, EXTENDED RELEASE ORAL at 08:31

## 2025-07-16 RX ADMIN — INSULIN LISPRO 1 UNITS: 100 INJECTION, SOLUTION INTRAVENOUS; SUBCUTANEOUS at 11:43

## 2025-07-16 RX ADMIN — MEXILETINE HYDROCHLORIDE 150 MG: 150 CAPSULE ORAL at 10:10

## 2025-07-16 RX ADMIN — LEVOTHYROXINE SODIUM 75 MCG: 0.07 TABLET ORAL at 05:53

## 2025-07-16 RX ADMIN — Medication 1 APPLICATION: at 20:32

## 2025-07-16 RX ADMIN — SPIRONOLACTONE 25 MG: 25 TABLET ORAL at 08:30

## 2025-07-16 RX ADMIN — Medication 1 APPLICATION: at 16:34

## 2025-07-16 RX ADMIN — EMPAGLIFLOZIN 25 MG: 25 TABLET, FILM COATED ORAL at 08:31

## 2025-07-16 RX ADMIN — INSULIN LISPRO 1 UNITS: 100 INJECTION, SOLUTION INTRAVENOUS; SUBCUTANEOUS at 16:34

## 2025-07-16 RX ADMIN — INSULIN GLARGINE 9 UNITS: 100 INJECTION, SOLUTION SUBCUTANEOUS at 20:22

## 2025-07-16 RX ADMIN — AMIODARONE HYDROCHLORIDE 0.5 MG/MIN: 1.8 INJECTION, SOLUTION INTRAVENOUS at 10:38

## 2025-07-16 RX ADMIN — Medication 1 APPLICATION: at 08:27

## 2025-07-16 RX ADMIN — TAMSULOSIN HYDROCHLORIDE 0.4 MG: 0.4 CAPSULE ORAL at 20:23

## 2025-07-16 RX ADMIN — METOPROLOL TARTRATE 25 MG: 25 TABLET, FILM COATED ORAL at 08:31

## 2025-07-16 RX ADMIN — AMIODARONE HYDROCHLORIDE 200 MG: 200 TABLET ORAL at 11:43

## 2025-07-16 RX ADMIN — FUROSEMIDE 40 MG: 10 INJECTION, SOLUTION INTRAMUSCULAR; INTRAVENOUS at 09:18

## 2025-07-16 RX ADMIN — MEXILETINE HYDROCHLORIDE 150 MG: 150 CAPSULE ORAL at 20:23

## 2025-07-16 RX ADMIN — LISINOPRIL 10 MG: 5 TABLET ORAL at 08:31

## 2025-07-16 RX ADMIN — APIXABAN 2.5 MG: 5 TABLET, FILM COATED ORAL at 20:23

## 2025-07-16 RX ADMIN — FUROSEMIDE 40 MG: 40 TABLET ORAL at 16:34

## 2025-07-16 ASSESSMENT — PAIN - FUNCTIONAL ASSESSMENT
PAIN_FUNCTIONAL_ASSESSMENT: 0-10

## 2025-07-16 ASSESSMENT — COGNITIVE AND FUNCTIONAL STATUS - GENERAL
DRESSING REGULAR UPPER BODY CLOTHING: A LITTLE
MOBILITY SCORE: 15
MOVING TO AND FROM BED TO CHAIR: A LITTLE
STANDING UP FROM CHAIR USING ARMS: A LOT
DRESSING REGULAR LOWER BODY CLOTHING: A LOT
MOVING FROM LYING ON BACK TO SITTING ON SIDE OF FLAT BED WITH BEDRAILS: A LITTLE
WALKING IN HOSPITAL ROOM: TOTAL
DRESSING REGULAR UPPER BODY CLOTHING: A LITTLE
MOBILITY SCORE: 10
WALKING IN HOSPITAL ROOM: A LOT
CLIMB 3 TO 5 STEPS WITH RAILING: A LOT
CLIMB 3 TO 5 STEPS WITH RAILING: A LOT
MOVING FROM LYING ON BACK TO SITTING ON SIDE OF FLAT BED WITH BEDRAILS: A LOT
DRESSING REGULAR LOWER BODY CLOTHING: A LOT
CLIMB 3 TO 5 STEPS WITH RAILING: TOTAL
TOILETING: A LOT
TURNING FROM BACK TO SIDE WHILE IN FLAT BAD: A LITTLE
TURNING FROM BACK TO SIDE WHILE IN FLAT BAD: A LITTLE
STANDING UP FROM CHAIR USING ARMS: A LOT
TURNING FROM BACK TO SIDE WHILE IN FLAT BAD: A LOT
HELP NEEDED FOR BATHING: A LITTLE
MOBILITY SCORE: 15
MOVING TO AND FROM BED TO CHAIR: A LOT
DAILY ACTIVITIY SCORE: 17
HELP NEEDED FOR BATHING: A LITTLE
WALKING IN HOSPITAL ROOM: A LOT
STANDING UP FROM CHAIR USING ARMS: A LOT
MOVING TO AND FROM BED TO CHAIR: A LITTLE
TOILETING: A LOT
PERSONAL GROOMING: A LITTLE
PERSONAL GROOMING: A LITTLE
DAILY ACTIVITIY SCORE: 17
MOVING FROM LYING ON BACK TO SITTING ON SIDE OF FLAT BED WITH BEDRAILS: A LITTLE

## 2025-07-16 ASSESSMENT — PAIN SCALES - GENERAL
PAINLEVEL_OUTOF10: 0 - NO PAIN
PAINLEVEL_OUTOF10: 2

## 2025-07-16 NOTE — PROGRESS NOTES
Subjective Data:  Denies chest pain, dyspnea    Overnight Events:  No VT on telemetry for 12+ hours     Objective Data:  Last Recorded Vitals:  Vitals:    07/15/25 2000 07/16/25 0000 07/16/25 0530 07/16/25 0737   BP:   105/67 101/67   BP Location:    Left arm   Patient Position:    Lying   Pulse: 70 71 70 70   Resp:  16 13 24   Temp:   36.2 °C (97.2 °F)    TempSrc:       SpO2: 92% 94% 98% 97%   Weight:   82.4 kg (181 lb 9.6 oz)    Height:           Last Labs:  CBC - 7/16/2025:  5:27 AM  4.2 14.7 125    47.0      CMP - 7/16/2025:  5:27 AM  7.9 6.3 32 --- 1.5   3.1 3.0 22 148      PTT - 7/13/2025:  8:45 AM  1.5   16.2 38     TROPHS   Date/Time Value Ref Range Status   07/13/2025 09:41 AM 21 0 - 20 ng/L Final   07/13/2025 08:45 AM 23 0 - 20 ng/L Final   03/26/2025 01:41 PM 23 0 - 20 ng/L Final     BNP   Date/Time Value Ref Range Status   07/13/2025 08:45 AM 2,051 0 - 99 pg/mL Final   03/26/2025 12:38 PM 1,154 0 - 99 pg/mL Final     HGBA1C   Date/Time Value Ref Range Status   05/13/2025 11:11 AM 8.9 4.3 - 5.6 % Final   08/29/2024 06:58 PM 7.7 4.7 - 6.4 % Final     Comment:     Interpretation:     Standardized A1c  Good control or normal:  4-6% ( mg/dL avg)  Moderate control:        6.1-8.0% (120-180 mg/dL avg)  Poor control:            >8.0% (180 mg/dL avg)  With 4% as a baseline, each 1% increase = 30 mg/dL increase in average   glucose.  Taken from DCCT (Diabetes Control Complications Trial)      Last I/O:  I/O last 3 completed shifts:  In: 3720.3 (45.2 mL/kg) [P.O.:1680; I.V.:1740.3 (21.1 mL/kg); IV Piggyback:300]  Out: 3500 (42.5 mL/kg) [Urine:3500 (1.2 mL/kg/hr)]  Weight: 82.4 kg     Past Cardiology Tests (Last 3 Years):  Echo:  Transthoracic Echo (TTE) Complete 01/13/2025  1. The left ventricular systolic function is severely decreased, with a visually estimated ejection fraction of 20-25%.   2. There is global hypokinesis of the left ventricle with minor regional variations.   3. Left ventricular cavity  size is moderately dilated.   4. There is reduced right ventricular systolic function.   5. Severely enlarged right ventricle.   6. The left atrium is severely dilated.   7. The right atrium is moderately dilated.   8. Mild to moderate mitral valve regurgitation.   9. Mild to moderate tricuspid regurgitation visualized.  10. Right ventricular systolic pressure is within normal limits.  11. There is moderate pulmonic valve regurgitation.    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 Moderate sedation consisting of continuous ECG,   pulse oximetry and cardiopulmonary monitoring was performed by   the Cardiology Nurse, overseen by supervising physician, for an   intra-service time of 0 hr. 20 min.     Sedative Medications:   Drug: Versed   Dose: 1 mg   Route: IV     Drug: Fentanyl   Dose: 25 mcg   Route: IV   PRIORITY AT TIME OF PROCEDURE: Elective     SITE OF ENTRY: Radial:Right Radial     CONTRAST: Omnipaque 350 mg Iodine/mL (iohexol injection,   solution): 25 mL     ADDITIONAL PROCEDURES     CORONARY ANGIOGRAPHY:   The patient was taken to the cardiac cath lab where the entry   site was prepped and draped in a sterile manner. Under local   anesthesic with 2% Lidocaine, the vessel was cannulated with   Seldinger's technique using an arterial needle and a  6F sheath   was introduced.     Selective injections were made in the left and right coronary   arteries and various right, left and oblique views were obtained.       ADDITIONAL PROCEDURES     LEFT MAIN TRUNK: No Stenosis     LEFT ANTERIOR DESCENDIN% Stenosis     Location: Proximal      DIAGONAL #1: No Stenosis   DIAGONAL #2: No Stenosis     LEFT CIRCUMFLEX: 20% Stenosis    Location: Proximal     MARGINAL #1: No Stenosis     MARGINAL #2: No Stenosis     MARGINAL #3: No Stenosis     DOMINANT: YES   POSTERIOR DESCENDIN% Stenosis    Location: Proximal      RIGHT CORONARY: No Stenosis     DOMINANT: NO     Hemodynamics:   LVEDP: 8 mm Hg.   LV-Ao gradient : 0 mm Hg.   LVEF: Not done. LEVEF available by nuclear image. + CKD.     Stress Test:  Nuclear Stress Test 2024  1. SPECT Perfusion Study: Abnormal.    2. There is mild (<10%) ischemia in the territory of the LCX.    3. There is a small (<10%) fixed perfusion defect in the RCA territory.    4. Left ventricle is severely dilated. The left ventricle systolic   function is moderately decreased.    5. This is an intermediate risk scan.   LVEF % 34     Device Check:            Device check   2025  A Red Alert was reported by the device:   *  1 shocks for VT/VF, 0 failed.   * Alert: 1 shocks delivered for episode # 1070.   *  AT/AF>=24 h for 5 d.   * Possible OptiVol fluid accumulation: 15-May-2025 -- ongoing.   * At least 1 therapy failed in 1 VT/VF episodes.   * 1 VT episodes accelerated to VF.   * 1 monitored VT episodes.   * Ventricular sensing episodes averaged 41 minutes/day since the last session.       Inpatient Medications:  Scheduled Medications[1]  PRN Medications[2]  Continuous Medications[3]    Physical Exam  Vitals reviewed.   Constitutional:       Appearance: Normal appearance. He is ill-appearing.      Interventions: Nasal cannula in place.   HENT:      Head: Normocephalic and atraumatic.   Neck:      Vascular: JVD present. No carotid  bruit.     Cardiovascular:      Rate and Rhythm: Normal rate and regular rhythm.      Pulses: Normal pulses.      Heart sounds: Normal heart sounds.      Comments: ICD left upper chest  Pulmonary:      Effort: Pulmonary effort is normal.      Breath sounds: Normal breath sounds.   Chest:      Chest wall: No tenderness.   Abdominal:      General: Abdomen is flat. Bowel sounds are normal.      Palpations: Abdomen is soft.     Musculoskeletal:      Right lower le+ Pitting Edema present.      Left lower le+ Pitting Edema present.     Skin:     General: Skin is warm and dry.     Neurological:      General: No focal deficit present.      Mental Status: He is alert and oriented to person, place, and time. Mental status is at baseline.     Psychiatric:         Mood and Affect: Mood normal.         Behavior: Behavior normal.         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 Initial EKG showed atrial fibrillation with  bpm. BNP , troponin 23, BUN 40, creatinine 2.18, K 3.5, mag 2.45. CXR with CHF. Recently followed up with Dr. Fallon and was started on mexiletine 150 mg twice a day, states he did not start the medication due to cost. Remains on IV amiodarone    VT with ICD shock, s/p ablations (2013, 2014, 10/03/2014) admitted to hospital 2020 with VT storm and 33 ICD shocks)  Acute on chronic systolic heart failure  NICM  CHB: s/p CRT-D upgrade 2013 with generator change on 2018  Afib: s/p DCCV 10/2019, 2025 successful DCCV with 1 shock at 200 joules where sinus rhythm was restored  CKD3  T2DM  HTN  HLD      Plan  -TTE  with LVEF 20-25%, moderate MR, moderate TR - similar to prior  -Device check with ICD shock for VT 7/12 1956  -Stop IV amiodarone, transition to 200 mg BID (was only taking 200 mg daily at home)  -Continue mexiletine 150 mg BID  -Per EP - continue amiodarone and mexiletine, Keep K >4, mag >2  -I&O -1.4L  -Stop IV Lasix 40 mg BID, transition to po 40 mg BID  -Continue spironolactone 25 mg daily, lisinopril 10 mg daily  -Stop metoprolol tartrate 25 mg BID, transition to metoprolol succinate 50 mg daily   -Continue Jardiance 25 mg daily  -Continue Eliquis 2.5 mg BID  -Continue rosuvastatin 10 mg daily  -Recommend PT/OT eval  -No further cardiac recommendations at this time, will sign off  -Outpatient follow up with EP and advanced heart failure    Peripheral IV 07/13/25 18 G Right Antecubital (Active)   Site Assessment Clean;Dry;Intact 07/14/25 0824   Dressing Status Clean;Dry;Occlusive 07/14/25 0824   Number of days: 1       Peripheral IV 07/13/25 22 G Anterior;Left Forearm (Active)   Site Assessment Clean;Dry;Intact 07/14/25 0824   Dressing Status Clean;Dry;Occlusive 07/14/25 0824   Number of days: 1       Code Status:  Full Code        Sarath Bourne, LAUREN-CNP             [1]   Scheduled medications   Medication Dose Route Frequency    apixaban  2.5 mg oral BID    empagliflozin  25 mg oral Daily    furosemide  40 mg intravenous q12h    insulin glargine  9 Units subcutaneous Nightly    insulin lispro  0-5 Units subcutaneous TID AC    levothyroxine  75 mcg oral Daily    lisinopril  10 mg oral Daily    metoprolol tartrate  25 mg oral BID    mexiletine  150 mg oral q12h    potassium chloride CR  40 mEq oral Daily    rosuvastatin  10 mg oral Daily    spironolactone  25 mg oral q24h VENKATESH    tamsulosin  0.4 mg oral Nightly    zinc oxide  1 Application Topical TID   [2]   PRN medications   Medication    acetaminophen    Or    acetaminophen    Or    acetaminophen    acetaminophen    Or    acetaminophen    Or     acetaminophen    dextrose    dextrose    glucagon    glucagon    oxyCODONE-acetaminophen   [3]   Continuous Medications   Medication Dose Last Rate    amiodarone  0.5 mg/min 0.5 mg/min (07/16/25 0558)

## 2025-07-16 NOTE — PROGRESS NOTES
07/16/25 1144   Discharge Planning   Living Arrangements Spouse/significant other   Support Systems Spouse/significant other   Assistance Needed Patient is A&OX4, independent in ADLs, ambulates with a cane or walker. No O2, CPAP or Bipap at baseline. On Eliquis prior to admit. PCP Dr. Puja Barrera   Type of Residence Private residence   Number of Stairs to Enter Residence 4   Number of Stairs Within Residence 0   Do you have animals or pets at home? No   Who is requesting discharge planning? Provider   Expected Discharge Disposition Home  (Home with spouse. Continues to deny any C needs. Spouse at bedside concurs.)   Does the patient need discharge transport arranged? No   Stroke Family Assessment   Stroke Family Assessment Needed No   Intensity of Service   Intensity of Service 0-30 min

## 2025-07-16 NOTE — PROGRESS NOTES
"David Rojas \"Sabino\" is a 89 y.o. male on day 3 of admission presenting with Ventricular tachycardia (paroxysmal).    Subjective   Patient was seen at bedside this morning. He expresses concern that he is unable to ambulate. He reports that at home he has the proper equipment to move around his 1 floor apartment. Denies CP and SOB. Denies HA, lightheadedness, and blurry vision.      He is agreeable to try to move to chair for dinner tonight.        Objective     Last Recorded Vitals  BP 95/65   Pulse 71   Temp 36.2 °C (97.2 °F)   Resp 24   Wt 82.4 kg (181 lb 9.6 oz)   SpO2 97%   Intake/Output last 3 Shifts:    Intake/Output Summary (Last 24 hours) at 7/16/2025 1118  Last data filed at 7/16/2025 1103  Gross per 24 hour   Intake 1474.35 ml   Output 1950 ml   Net -475.65 ml     Admission Weight  Weight: 83.5 kg (184 lb) (07/13/25 0829)    Daily Weight  07/16/25 : 82.4 kg (181 lb 9.6 oz)    Image Results  Transthoracic Echo Complete     [facility], [address] [place] [address]                Tel [phone] and Fax [phone]    TRANSTHORACIC ECHOCARDIOGRAM REPORT       Patient Name:       DAVID CINTHYA SEAN    Reading Physician:    [id number] Nicole Butler MD  Study Date:         7/15/2025           Ordering Provider:    [id number] MONICA ELI  MRN/PID:            [id number]            Fellow:  Accession#:         [id number]        Nurse:  Date of Birth/Age:  1935 / 89      Sonographer:          Karie roper                                     [facility]  Gender assigned at  M                   Additional Staff:  Birth:  Height:             182.88 cm           Admit Date:  Weight:             83.01 kg            Admission Status:     Inpatient -                                                                Routine  BSA / BMI:          " 2.05 m2 / 24.82     Encounter#:           [id number]                      kg/m2  Blood Pressure:     97/63 mmHg          Department Location:  [facility] Non                                                                Invasive    Study Type:    TRANSTHORACIC ECHO (TTE) COMPLETE  Diagnosis/ICD: Heart failure, unspecified-I50.9; Other cardiomyopathies-I42.8  Indication:    HFrEF; NICM  CPT Code:      Echo Complete w Full [id number]    Patient History:  Pacer/Defib:       AICD/Perment pacemaker  Pertinent History: Cardiomyopathy, Chest Pain, CHF and CKD.    Study Detail: The following Echo studies were performed: 2D, M-Mode, Doppler and                color flow.       PHYSICIAN INTERPRETATION:  Left Ventricle: The left ventricular systolic function is severely decreased with a visually estimated ejection fraction of 20-25%. There is global hypokinesis of the left ventricle with minor regional variations. The left ventricular cavity size is mild to moderately dilated. There is normal septal and mildly increased posterior left ventricular wall thickness. There is left ventricular concentric remodeling. Left ventricular diastolic filling cannot be determined due to right ventricular pacing.  Left Atrium: The left atrial size is severely dilated.  Right Ventricle: The right ventricle is severely enlarged. There is normal right ventricular global systolic function. A device is visualized in the right ventricle.  Right Atrium: The right atrium is severely dilated. There is a device visualized in the right atrium.  Aortic Valve: The aortic valve is trileaflet. There is mild aortic valve cusp calcification. There is mild aortic valve regurgitation.  Mitral Valve: The mitral valve is mildly thickened. The doppler estimated peak and mean diastolic pressure gradients are 0.7 mmHg and 0 mmHg, respectively. There is mild mitral annular calcification. The mean gradient of the mitral valve is 0 mmHg. There is moderate mitral  valve regurgitation. The E Vmax is 0.37 m/s. The mitral regurgitant orifice area is 13 mm2. The mitral regurgitant volume is 18.98 ml.  Tricuspid Valve: The tricuspid valve is structurally normal. There is moderate tricuspid regurgitation. The Doppler estimated right ventricular systolic pressure (RVSP) is slightly elevated at 32 mmHg.  Pulmonic Valve: The pulmonic valve is structurally normal. There is moderate pulmonic valve regurgitation.  Pericardium: Small pericardial effusion.  Pleural: There is left pleural effusion.  Aorta: The aortic root is abnormal. There is mild dilatation of the aortic root.  Systemic Veins: The inferior vena cava was not well visualized, IVC inspiratory collapse is not well visualized.       CONCLUSIONS:   1. The left ventricular systolic function is severely decreased with a visually estimated ejection fraction of 20-25%.   2. There is global hypokinesis of the left ventricle with minor regional variations.   3. Left ventricular diastolic filling cannot be determined due to right ventricular pacing.   4. Left ventricular cavity size is mild to moderately dilated.   5. There is normal right ventricular global systolic function.   6. Severely enlarged right ventricle.   7. The left atrial size is severely dilated.   8. The right atrium is severely dilated.   9. Small pericardial effusion.  10. Moderate mitral valve regurgitation.  11. Moderate tricuspid regurgitation visualized.  12. The Doppler estimated RVSP is slightly elevated at 32 mmHg.  13. Mild aortic valve regurgitation.  14. There is moderate pulmonic valve regurgitation.    QUANTITATIVE DATA SUMMARY:     2D MEASUREMENTS:          Normal Ranges:  LAs:             5.11 cm  (2.7-4.0cm)  IVSd:            1.02 cm  (0.6-1.1cm)  LVPWd:           1.20 cm  (0.6-1.1cm)  LVIDd:           5.36 cm  (3.9-5.9cm)  LVIDs:           4.49 cm  LV Mass Index:   114 g/m2  LVEDV Index:     83 ml/m2  LV % FS          16.2 %       LEFT ATRIUM:                   Normal Ranges:  LA Vol A4C:        142.3 ml   (22+/-6mL/m2)  LA Vol A2C:        125.7 ml  LA Vol BP:         136.5 ml  LA Vol Index A4C:  69.4 ml/m2  LA Vol Index A2C:  61.3 ml/m2  LA Vol Index BP:   66.6 ml/m2  LA Area A4C:       35.2 cm2  LA Area A2C:       32.4 cm2  LA Major Axis A4C: 7.4 cm  LA Major Axis A2C: 7.1 cm  LA Volume Index:   67.0 ml/m2  LA Vol A4C:        137.6 ml  LA Vol A2C:        120.6 ml  LA Vol Index BSA:  62.9 ml/m2       M-MODE MEASUREMENTS:         Normal Ranges:  Ao Root:             4.00 cm (2.0-3.7cm)       AORTA MEASUREMENTS:         Normal Ranges:  Asc Ao, d:          3.80 cm (2.1-3.4cm)       LV SYSTOLIC FUNCTION:                       Normal Ranges:  EF-A4C View:    29 % (>=55%)  EF-A2C View:    31 %  EF-Biplane:     30 %  EF-Visual:      23 %  LV EF Reported: 23 %       LV DIASTOLIC FUNCTION:             Normal Ranges:  MV Peak E:             0.37 m/s    (0.7-1.2 m/s)  MV Peak A:             0.23 m/s    (0.42-0.7 m/s)  E/A Ratio:             1.61        (1.0-2.2)  MV e'                  0.035 m/s   (>8.0)  MV lateral e'          0.04 m/s  MV medial e'           0.03 m/s  MV A Dur:              115.34 msec  E/e' Ratio:            10.61       (<8.0)  PulmV Sys Audie:         19.45 cm/s  PulmV Blunt Audie:        28.94 cm/s  PulmV S/D Audie:         0.67  PulmV A Revs Audie:      10.74 cm/s  PulmV A Revs Dur:      69.20 msec       MITRAL VALVE:          Normal Ranges:  MV Vmax:      0.42 m/s (<=1.3m/s)  MV peak P.7 mmHg (<5mmHg)  MV mean P.4 mmHg (<2mmHg)  MV VTI:       11.08 cm (10-13cm)  MV DT:        244 msec (150-240msec)       MITRAL INSUFFICIENCY:             Normal Ranges:  MR VTI:               149.74 cm  MR Vmax:              448.32 cm/s  MR Volume:            18.98 ml  MR Flow Rt:           56.84 ml/s  MR EROA:              13 mm2       AORTIC VALVE:           Normal Ranges:  AoV Vmax:      0.87 m/s (<=1.7m/s)  AoV Peak PG:   3.1 mmHg (<20mmHg)  LVOT Max Audie:   0.57 m/s (<=1.1m/s)  LVOT VTI:      9.17 cm  LVOT Diameter: 2.23 cm  (1.8-2.4cm)  AoV Area,Vmax: 2.54 cm2 (2.5-4.5cm2)       AORTIC INSUFFICIENCY:  AI Vmax:       3.69 m/s  AI Half-time:  621 msec  AI Decel Time: 2141 msec  AI Decel Rate: 172.24 cm/s2       RIGHT VENTRICLE:  RV Basal 5.70 cm  RV Mid   5.20 cm  RV Major 5.7 cm  TAPSE:   24.0 mm  RV s'    0.11 m/s       TRICUSPID VALVE/RVSP:          Normal Ranges:  Peak TR Velocity:     2.69 m/s  Est. RA Pressure:     3  RV Syst Pressure:     32       (< 30mmHg)       PULMONIC VALVE:          Normal Ranges:  PV Accel Time:  69 msec  (>120ms)  PV Max Audie:     0.6 m/s  (0.6-0.9m/s)  PV Max P.3 mmHg       PULMONARY VEINS:  PulmV A Revs Dur: 69.20 msec  PulmV A Revs Audie: 10.74 cm/s  PulmV Blunt Audie:   28.94 cm/s  PulmV S/D Audie:    0.67  PulmV Sys Audie:    19.45 cm/s       AORTA:  Asc Ao Diam 3.78 cm       [id number] Nicole Butler MD  Electronically signed on 2025 at 9:20:33 AM       ** Final **      Physical Exam  Vitals reviewed.   Constitutional:       General: He is not in acute distress.    Cardiovascular:      Heart sounds: Heart sounds are distant.   Pulmonary:      Effort: No respiratory distress.      Breath sounds: Examination of the right-lower field reveals decreased breath sounds. Examination of the left-lower field reveals decreased breath sounds. Decreased breath sounds present.   Abdominal:      General: Abdomen is flat.      Palpations: Abdomen is soft.      Tenderness: There is no abdominal tenderness.     Musculoskeletal:      Right lower leg: Edema present.      Left lower leg: Edema present.      Comments: L knee brace     Skin:     Comments: R arm edema with bruising     Neurological:      Mental Status: He is alert and oriented to person, place, and time.     Psychiatric:         Mood and Affect: Mood normal.         Behavior: Behavior normal.         Thought Content: Thought content normal.       Relevant Results  Results for orders placed  or performed during the hospital encounter of 07/13/25 (from the past 24 hours)   Transthoracic Echo Complete   Result Value Ref Range    AV pk andrew 0.87 m/s    LVOT diam 2.23 cm    MV E/A ratio 1.61     LA vol index A/L 66.6 ml/m2    Tricuspid annular plane systolic excursion 2.4 cm    LV EF 23 %    RV free wall pk S' 11.00 cm/s    LVIDd 5.36 cm    RVSP 32 mmHg    Aortic Valve Area by Continuity of Peak Velocity 2.54 cm2    AV pk grad 3 mmHg    LV A4C EF 29.4    POCT GLUCOSE   Result Value Ref Range    POCT Glucose 180 (H) 74 - 99 mg/dL   POCT GLUCOSE   Result Value Ref Range    POCT Glucose 191 (H) 74 - 99 mg/dL   POCT GLUCOSE   Result Value Ref Range    POCT Glucose 158 (H) 74 - 99 mg/dL   Renal Function Panel   Result Value Ref Range    Glucose 76 74 - 99 mg/dL    Sodium 142 136 - 145 mmol/L    Potassium 4.0 3.5 - 5.3 mmol/L    Chloride 105 98 - 107 mmol/L    Bicarbonate 30 21 - 32 mmol/L    Anion Gap 11 10 - 20 mmol/L    Urea Nitrogen 37 (H) 6 - 23 mg/dL    Creatinine 1.96 (H) 0.50 - 1.30 mg/dL    eGFR 32 (L) >60 mL/min/1.73m*2    Calcium 7.9 (L) 8.6 - 10.3 mg/dL    Phosphorus 3.1 2.5 - 4.9 mg/dL    Albumin 3.0 (L) 3.4 - 5.0 g/dL   CBC   Result Value Ref Range    WBC 4.2 (L) 4.4 - 11.3 x10*3/uL    nRBC 0.0 0.0 - 0.0 /100 WBCs    RBC 5.11 4.50 - 5.90 x10*6/uL    Hemoglobin 14.7 13.5 - 17.5 g/dL    Hematocrit 47.0 41.0 - 52.0 %    MCV 92 80 - 100 fL    MCH 28.8 26.0 - 34.0 pg    MCHC 31.3 (L) 32.0 - 36.0 g/dL    RDW 18.5 (H) 11.5 - 14.5 %    Platelets 125 (L) 150 - 450 x10*3/uL   POCT GLUCOSE   Result Value Ref Range    POCT Glucose 80 74 - 99 mg/dL          Assessment & Plan  Ventricular tachycardia (paroxysmal)    Unstable Ventricular tachycardia s/p ICD discharge- resolved, now paced   - Stop IV amiodarone and transition to 200mg PO BID   - Continue mexiletine 150mg POBID   - Continue Eliquis 2.5mg PO BID  - Telemetry    - Follow up OP with EP and advanced heart failure   - Cardiology and EP following     Acute  on chronic systolic HF   - CXR on 07/13/25 showed cardiomegaly with pulmonary edema and small bilateral pleural effusions concerning for CHF   - TTE on showed LVEF 20-25%, severely enlarged RV, severely dilated LA and RA, small pericardial effusion, and moderate MR, TR, and SC  - Continue Spironolactone 25mg PO daily, lisinopril 10mg PO daily, Jardiance 25mg PO daily   - Stop metoprolol tartate 25mg PO BID, transition to metoprolol succinate 50mg PO daily   - Stop IV Lasix 40mg BID, transition to 40mg PO BID   - Cardiac Diet with Na/Fluid restriction. Strict I/Os. Daily Weight     Generalized weakness   - Pt reports he is unable to ambulate due to left knee pain. He says he takes Percocet for pain when needed.   - PT/OT evaluation   - Percocet 5-325mg PO q8hrs PRN to help with pain before moving  - Encourage ambulation     Electrolytes   - Replaced as needed   - EP recs: K > 4 and Mg > 2     Type 2 DM   - Lantus   - SSI  - Accucheks    Left Buttock wound   - Wound care consulted with recs: zinc, medihoney, aquace; waffle boots; q2 turns  - Patient refusing waffle boots per RN     Hypothyroidism   Abnormal thyroid function testing  - Endocrinology consulted: Free T4 elevated due to amiodarone effect Continue Levothyroxine 75mcg daily; Repeat TSH 4-6 weeks     HTN   - Some BP medication was held yesterday due to soft BP   - Linsinopril 10mg PO daily, Spironolactone 25mg PO daily, metoprolol succinate XL 50mg PO daily   - Monitor BP and adjust medications as needed    HLD   - Rosuvastatin 10mg PO daily     BPH   - Flomax 0.4mg Po nightly     DVT prophylaxis: Eliquis; refusing to wear SCD per nurse   Disposition: PT/OT evaluation for mobility/safety concerns prior to DC        ANALI HARRELLS     Attending attestation:  Patient seen and evaluated with PA student, adjusting cardiac meds to PO and awaiting PT/OT for final recommendations regarding disposition. Will likely need SNF

## 2025-07-16 NOTE — PROGRESS NOTES
"Physical Therapy    Physical Therapy Evaluation    Patient Name: Valentin Rojas \"Sabino\"  MRN: [id number]  Department: [address]  Room: [address] [place] [address]  Today's Date: 7/16/2025   Time Calculation  Start Time: 1435  Stop Time: 1500  Time Calculation (min): 25 min    Assessment/Plan   PT Assessment  PT Assessment Results: Decreased strength, Decreased endurance, Impaired balance, Decreased mobility, Decreased cognition, Impaired judgement, Pain  Rehab Prognosis: Good  Barriers to Discharge Home: Caregiver assistance, Cognition needs, Physical needs  Caregiver Assistance: Caregiver assistance needed per identified barriers - however, level of patient's required assistance exceeds assistance available at home  Cognition Needs: Insight of patient limited regarding functional ability/needs  Physical Needs: 24hr mobility assistance needed, 24hr ADL assistance needed, High falls risk due to function or environment  Evaluation/Treatment Tolerance: Patient limited by pain (and cognition/understanding of need for mobility and therapy)  Medical Staff Made Aware: Yes  Strengths: Support and attitude of living partners  Barriers to Participation: Ability to acquire knowledge, Insight into problems  End of Session Communication: Bedside nurse  Assessment Comment: Pt. presents with decreased cognition, strength, balance, endurance, and pain which contribute to need for assist x 1-2 persons to safely complete basic mobility tasks and increases his risk for falls.  Recommend PT intervention during acute LOS and MODERATE INTENSITY PT INTERVENTION at discharge.  End of Session Patient Position: Up in chair, Alarm on (RN aware)  IP OR SWING BED PT PLAN  Inpatient or Swing Bed: Inpatient  PT Plan  Treatment/Interventions: Bed mobility, Transfer training, Gait training, Stair training, Balance training, Strengthening, Endurance training, Therapeutic exercise, Therapeutic activity  PT Plan: Ongoing PT  PT Frequency: 3 times per week  PT Discharge " Recommendations: Moderate intensity level of continued care  Equipment Recommended upon Discharge: Wheeled walker (pt owns)  PT Recommended Transfer Status: Assist x1, Assistive device  PT - OK to Discharge: Yes    Subjective     PT Visit Info:  PT Received On: 07/16/25  General Visit Information:  General  Reason for Referral: 89 yo male referred to PT for impaired mobility/gait training - admit with ventricular tachycardia; ICD shock on 7/13 at home  Referred By: Neda Ramirez DO  Past Medical History Relevant to Rehab: medical history significant for T2DM, CKD3, HTN, HLD, GERD, BPH, Hypothyroidism, Complete heart block s/p pacemaker/ICD, Vtach s/p albations and ICD, Afib  Family/Caregiver Present: No  Prior to Session Communication: Bedside nurse (RN notes pt. inconsistent reports on PTA mobility and function, easily agitated when attempting to obtain clearer understanding through questions)  Patient Position Received: Bed, 3 rail up, Alarm on  Preferred Learning Style: auditory, verbal, visual  General Comment: Agreeable to therapy assessment with encouragement and some convincing d/t some confusion?  Pt with inconsistent history re: PTA living situation, mobility, and function.  Pt. easily agitated with questions  Home Living:  Home Living  Type of Home: House (but also an apt?  Pt. states he currently lives in a house with 3 MARCELLUS and 1 rail but he and his wife also have an apt with no steps yet do not live there yet)  Lives With: Spouse  Home Adaptive Equipment: Walker rolling or standard, Cane (rollator)  Home Layout: One level  Home Access: Stairs to enter with rails  Entrance Stairs-Rails: Left  Entrance Stairs-Number of Steps: 3 (per pt report)  Home Living Comments: Not certain d/t pt. conflicting reports.  Prior Level of Function:  Prior Function Per Pt/Caregiver Report  Prior Function Comments: Per chart - pt was IND with ADL's and ambulating household distances with rollator; however pt also  "reports being stuck in bed for the last 5 years yet also walking in his home with a rollator  Precautions:  Precautions  Medical Precautions: Fall precautions  Precautions Comment: External cath, IV, tele      Date/Time Vitals Session Patient Position Pulse Resp SpO2 BP MAP (mmHg)    07/16/25 1435 During PT  --  70  --  95 %  --  --            Objective   Pain:  Pain Assessment  Pain Assessment: 0-10  0-10 (Numeric) Pain Score: 0 - No pain (at rest, but reports severe pain in L knee that prevents him from moving which he attributes to \"bone on bone\" - yet with assist and encouragement, pt. able to move his LLE and weight bear)  Pain Type: Chronic pain  Pain Location: Knee  Pain Orientation: Left  Pain Frequency: Intermittent  Clinical Progression: Not changed  Pain Interventions: Ambulation/increased activity, Repositioned  Response to Interventions:  (pain reported with mobility but none at rest - pt content in chair at end of session with BLE elevated)  Cognition:  Cognition  Overall Cognitive Status: Impaired  Arousal/Alertness: Appropriate responses to stimuli  Orientation Level: Oriented X4 (however inconsistent responses to PTA history questions (\"have not walked in 5 years\" \"i use my rollator to get around the house\" \"my wife helps me down my stairs to the car\"))  Following Commands: Follows all commands and directions without difficulty  Cognition Comments: Other inconsistent reports: \"I cannot stand because of my left knee pain\" \"I stand at the edge of my bed to use my urinal but my wife empties it for me\")  Insight: Moderate (States he cannot get OOB but also states he can go to the outpatient PT clinic since he does NOT want to go to a rehab facility.)    General Assessments:     Activity Tolerance  Endurance: Tolerates less than 10 min exercise, no significant change in vital signs  Early Mobility/Exercise Safety Screen: Proceed with mobilization - No exclusion criteria met    Strength  Strength " Comments: LLE hip flex: 2+/5, knee flex 2+/5, knee ext 3-/5, ankle DF/PF 4-/5.   RLE hip flex 3-/5, knee flex/ext 3/5, ankle DF/PF 4-/5.  BLE edema present  Strength  Strength Comments: LLE hip flex: 2+/5, knee flex 2+/5, knee ext 3-/5, ankle DF/PF 4-/5.   RLE hip flex 3-/5, knee flex/ext 3/5, ankle DF/PF 4-/5.  BLE edema present    Static Sitting Balance  Static Sitting-Balance Support: No upper extremity supported, Feet supported  Static Sitting-Level of Assistance: Close supervision  Dynamic Sitting Balance  Dynamic Sitting-Balance Support: No upper extremity supported, Feet supported  Dynamic Sitting-Level of Assistance: Close supervision  Dynamic Sitting-Balance: Reaching for objects, Reaching across midline  Dynamic Sitting-Comments: small range reaching    Static Standing Balance  Static Standing-Balance Support: Bilateral upper extremity supported  Static Standing-Level of Assistance: Minimum assistance  Static Standing-Comment/Number of Minutes: mild instability B knees - fatigues easily  Functional Assessments:  Bed Mobility  Bed Mobility: Yes  Bed Mobility 1  Bed Mobility 1: Supine to sitting  Level of Assistance 1: Moderate assistance  Bed Mobility Comments 1: from flat bed with bedrail    Transfers  Transfer: Yes  Transfer 1  Transfer From 1: Bed to  Transfer to 1: Stand  Technique 1: Sit to stand  Transfer Device 1: Walker  Transfer Level of Assistance 1: Moderate assistance, Moderate verbal cues, Moderate tactile cues  Trials/Comments 1: with encouragement and convincing despite reports of chronic L knee pain d/t arthritis  Transfers 2  Transfer From 2: Bed to  Transfer to 2: Chair with arms  Technique 2: Sit to stand, Stand to sit  Transfer Device 2: Walker  Transfer Level of Assistance 2: Moderate assistance, Moderate verbal cues, Moderate tactile cues (with Honey during steps to chair)  Trials/Comments 2: unsteady with turning    Ambulation/Gait Training  Ambulation/Gait Training Performed:  Yes  Ambulation/Gait Training 1  Surface 1: Level tile  Device 1: Rolling walker  Assistance 1: Minimum assistance  Quality of Gait 1: Narrow base of support, Diminished heel strike, Inconsistent stride length, Decreased step length, Soft knee(s)  Comments/Distance (ft) 1: unsteadiness with turning; fatigues quickly    Stairs  Stairs: No  Extremity/Trunk Assessments:  RUE   RUE : Within Functional Limits  LUE   LUE: Within Functional Limits  RLE   RLE : Within Functional Limits  LLE   LLE : Within Functional Limits  Outcome Measures:  [facility] Basic Mobility  Turning from your back to your side while in a flat bed without using bedrails: A lot  Moving from lying on your back to sitting on the side of a flat bed without using bedrails: A lot  Moving to and from bed to chair (including a wheelchair): A lot  Standing up from a chair using your arms (e.g. wheelchair or bedside chair): A lot  To walk in hospital room: Total  Climbing 3-5 steps with railing: Total  Basic Mobility - Total Score: 10    Encounter Problems       Encounter Problems (Active)       Balance       STG - Maintains dynamic standing balance with upper extremity support to complete a functional task x 5 min with SBA, no LOB       Start:  07/16/25    Expected End:  07/30/25       INTERVENTIONS:  1. Practice standing with minimal support.  2. Educate patient about standing tolerance.  3. Educate patient about independence with gait, transfers, and ADL's.  4. Educate patient about use of assistive device.  5. Educate patient about self-directed care.            Mobility       STG - Patient will ambulate with FWW >50 ft with SBA, negotiating obstacles and directional changes without LOB       Start:  07/16/25    Expected End:  07/30/25               PT Transfers       STG - Transfer from bed to chair with FWW and SBA       Start:  07/16/25    Expected End:  07/30/25            STG - Patient to transfer to and from sit to supine with bedrail and SBA        Start:  07/16/25    Expected End:  07/30/25            STG - Patient will transfer sit to and from stand with FWW and SBA       Start:  07/16/25    Expected End:  07/30/25                   Education Documentation  Mobility Training, taught by Lu Paulino, PT at 7/16/2025  4:00 PM.  Learner: Patient  Readiness: Acceptance  Method: Explanation  Response: Needs Reinforcement  Comment: Educated re: role of PT, progression of PT, and safety while on unit.    Education Comments  No comments found.

## 2025-07-17 VITALS
RESPIRATION RATE: 15 BRPM | BODY MASS INDEX: 24.81 KG/M2 | SYSTOLIC BLOOD PRESSURE: 107 MMHG | TEMPERATURE: 97.7 F | WEIGHT: 183.2 LBS | OXYGEN SATURATION: 100 % | DIASTOLIC BLOOD PRESSURE: 75 MMHG | HEIGHT: 72 IN | HEART RATE: 70 BPM

## 2025-07-17 LAB
ALBUMIN SERPL BCP-MCNC: 2.9 G/DL (ref 3.4–5)
ANION GAP SERPL CALC-SCNC: 12 MMOL/L (ref 10–20)
BUN SERPL-MCNC: 37 MG/DL (ref 6–23)
CALCIUM SERPL-MCNC: 7.9 MG/DL (ref 8.6–10.3)
CHLORIDE SERPL-SCNC: 103 MMOL/L (ref 98–107)
CO2 SERPL-SCNC: 32 MMOL/L (ref 21–32)
CREAT SERPL-MCNC: 2.11 MG/DL (ref 0.5–1.3)
EGFRCR SERPLBLD CKD-EPI 2021: 30 ML/MIN/1.73M*2
ERYTHROCYTE [DISTWIDTH] IN BLOOD BY AUTOMATED COUNT: 17.6 % (ref 11.5–14.5)
GLUCOSE BLD MANUAL STRIP-MCNC: 126 MG/DL (ref 74–99)
GLUCOSE BLD MANUAL STRIP-MCNC: 185 MG/DL (ref 74–99)
GLUCOSE SERPL-MCNC: 106 MG/DL (ref 74–99)
HCT VFR BLD AUTO: 43.6 % (ref 41–52)
HGB BLD-MCNC: 13.9 G/DL (ref 13.5–17.5)
MAGNESIUM SERPL-MCNC: 2.36 MG/DL (ref 1.6–2.4)
MCH RBC QN AUTO: 29 PG (ref 26–34)
MCHC RBC AUTO-ENTMCNC: 31.9 G/DL (ref 32–36)
MCV RBC AUTO: 91 FL (ref 80–100)
NRBC BLD-RTO: 0 /100 WBCS (ref 0–0)
PHOSPHATE SERPL-MCNC: 3.3 MG/DL (ref 2.5–4.9)
PLATELET # BLD AUTO: 121 X10*3/UL (ref 150–450)
POTASSIUM SERPL-SCNC: 4.4 MMOL/L (ref 3.5–5.3)
RBC # BLD AUTO: 4.79 X10*6/UL (ref 4.5–5.9)
SODIUM SERPL-SCNC: 143 MMOL/L (ref 136–145)
WBC # BLD AUTO: 4.8 X10*3/UL (ref 4.4–11.3)

## 2025-07-17 PROCEDURE — 2500000002 HC RX 250 W HCPCS SELF ADMINISTERED DRUGS (ALT 637 FOR MEDICARE OP, ALT 636 FOR OP/ED): Performed by: NURSE PRACTITIONER

## 2025-07-17 PROCEDURE — 85027 COMPLETE CBC AUTOMATED: CPT | Performed by: FAMILY MEDICINE

## 2025-07-17 PROCEDURE — 2500000002 HC RX 250 W HCPCS SELF ADMINISTERED DRUGS (ALT 637 FOR MEDICARE OP, ALT 636 FOR OP/ED): Performed by: INTERNAL MEDICINE

## 2025-07-17 PROCEDURE — 82947 ASSAY GLUCOSE BLOOD QUANT: CPT

## 2025-07-17 PROCEDURE — 83735 ASSAY OF MAGNESIUM: CPT | Performed by: FAMILY MEDICINE

## 2025-07-17 PROCEDURE — 94760 N-INVAS EAR/PLS OXIMETRY 1: CPT

## 2025-07-17 PROCEDURE — 2500000001 HC RX 250 WO HCPCS SELF ADMINISTERED DRUGS (ALT 637 FOR MEDICARE OP): Performed by: INTERNAL MEDICINE

## 2025-07-17 PROCEDURE — 80069 RENAL FUNCTION PANEL: CPT | Performed by: FAMILY MEDICINE

## 2025-07-17 PROCEDURE — 99239 HOSP IP/OBS DSCHRG MGMT >30: CPT | Performed by: FAMILY MEDICINE

## 2025-07-17 PROCEDURE — 36415 COLL VENOUS BLD VENIPUNCTURE: CPT | Performed by: FAMILY MEDICINE

## 2025-07-17 PROCEDURE — 2500000004 HC RX 250 GENERAL PHARMACY W/ HCPCS (ALT 636 FOR OP/ED): Performed by: INTERNAL MEDICINE

## 2025-07-17 PROCEDURE — 2500000001 HC RX 250 WO HCPCS SELF ADMINISTERED DRUGS (ALT 637 FOR MEDICARE OP): Performed by: NURSE PRACTITIONER

## 2025-07-17 PROCEDURE — 97165 OT EVAL LOW COMPLEX 30 MIN: CPT | Mod: GO

## 2025-07-17 RX ORDER — ROSUVASTATIN CALCIUM 10 MG/1
10 TABLET, COATED ORAL DAILY
Start: 2025-07-18

## 2025-07-17 RX ORDER — EAR PLUGS
1 EACH OTIC (EAR) 3 TIMES DAILY
Start: 2025-07-17

## 2025-07-17 RX ORDER — ONDANSETRON HYDROCHLORIDE 2 MG/ML
4 INJECTION, SOLUTION INTRAVENOUS ONCE
Status: COMPLETED | OUTPATIENT
Start: 2025-07-17 | End: 2025-07-17

## 2025-07-17 RX ORDER — FUROSEMIDE 40 MG/1
40 TABLET ORAL
Start: 2025-07-17

## 2025-07-17 RX ORDER — METOPROLOL SUCCINATE 50 MG/1
50 TABLET, EXTENDED RELEASE ORAL DAILY
Start: 2025-07-18

## 2025-07-17 RX ORDER — OXYCODONE AND ACETAMINOPHEN 5; 325 MG/1; MG/1
1 TABLET ORAL EVERY 8 HOURS PRN
Qty: 5 TABLET | Refills: 0 | Status: SHIPPED | OUTPATIENT
Start: 2025-07-17 | End: 2025-07-20

## 2025-07-17 RX ORDER — ACETAMINOPHEN 650 MG/1
650 SUPPOSITORY RECTAL EVERY 6 HOURS PRN
Start: 2025-07-17

## 2025-07-17 RX ORDER — AMIODARONE HYDROCHLORIDE 200 MG/1
200 TABLET ORAL 2 TIMES DAILY
Start: 2025-07-17 | End: 2025-08-16

## 2025-07-17 RX ADMIN — AMIODARONE HYDROCHLORIDE 200 MG: 200 TABLET ORAL at 08:28

## 2025-07-17 RX ADMIN — METOPROLOL SUCCINATE 50 MG: 50 TABLET, EXTENDED RELEASE ORAL at 08:29

## 2025-07-17 RX ADMIN — FUROSEMIDE 40 MG: 40 TABLET ORAL at 08:29

## 2025-07-17 RX ADMIN — ROSUVASTATIN CALCIUM 10 MG: 10 TABLET, FILM COATED ORAL at 08:29

## 2025-07-17 RX ADMIN — INSULIN LISPRO 1 UNITS: 100 INJECTION, SOLUTION INTRAVENOUS; SUBCUTANEOUS at 13:09

## 2025-07-17 RX ADMIN — SPIRONOLACTONE 25 MG: 25 TABLET ORAL at 08:29

## 2025-07-17 RX ADMIN — APIXABAN 2.5 MG: 5 TABLET, FILM COATED ORAL at 08:28

## 2025-07-17 RX ADMIN — LISINOPRIL 10 MG: 5 TABLET ORAL at 08:28

## 2025-07-17 RX ADMIN — MEXILETINE HYDROCHLORIDE 150 MG: 150 CAPSULE ORAL at 08:30

## 2025-07-17 RX ADMIN — LEVOTHYROXINE SODIUM 75 MCG: 0.07 TABLET ORAL at 05:49

## 2025-07-17 RX ADMIN — ONDANSETRON 4 MG: 2 INJECTION, SOLUTION INTRAMUSCULAR; INTRAVENOUS at 01:49

## 2025-07-17 RX ADMIN — EMPAGLIFLOZIN 25 MG: 25 TABLET, FILM COATED ORAL at 08:30

## 2025-07-17 RX ADMIN — Medication 1 APPLICATION: at 08:38

## 2025-07-17 ASSESSMENT — COGNITIVE AND FUNCTIONAL STATUS - GENERAL
DAILY ACTIVITIY SCORE: 14
DRESSING REGULAR LOWER BODY CLOTHING: TOTAL
DRESSING REGULAR UPPER BODY CLOTHING: A LITTLE
TOILETING: TOTAL
HELP NEEDED FOR BATHING: A LOT
PERSONAL GROOMING: A LITTLE

## 2025-07-17 ASSESSMENT — ACTIVITIES OF DAILY LIVING (ADL)
ADL_ASSISTANCE: INDEPENDENT
BATHING_ASSISTANCE: MODERATE

## 2025-07-17 ASSESSMENT — PAIN - FUNCTIONAL ASSESSMENT
PAIN_FUNCTIONAL_ASSESSMENT: 0-10
PAIN_FUNCTIONAL_ASSESSMENT: 0-10

## 2025-07-17 ASSESSMENT — PAIN SCALES - GENERAL
PAINLEVEL_OUTOF10: 0 - NO PAIN
PAINLEVEL_OUTOF10: 0 - NO PAIN

## 2025-07-17 NOTE — PROGRESS NOTES
07/17/25 1221   Discharge Planning   Expected Discharge Disposition SNF  (PT/OT recommending mod. Discussed with pateint and spouse. Agreeable to SNF. Choices provided and selected 1) [facility] 2) [facility]. [facility] can accept. Await ADOD from provider.)     7/17/25 @ 1400: Medically ready for discharge today. Discharge orders complete and sent to facility in [facility]. 7000 completed. Transport confirmed for 1500 pickup. Patient, spouse, RN, and facility notified of discharge time. Number for N2N report provided to JENNA An.

## 2025-07-17 NOTE — DISCHARGE SUMMARY
Discharge Diagnosis  Unstable Ventricular Tachycardia s/p ICD discharge-Resolved; paced       Issues Requiring Follow-Up  Follow up with EP and Advanced Heart Failure     Discharge Meds     Medication List      START taking these medications     acetaminophen 650 mg suppository; Commonly known as: Tylenol; Insert 1   suppository (650 mg) into the rectum every 6 hours if needed for mild pain   (1 - 3).   metoprolol succinate XL 50 mg 24 hr tablet; Commonly known as:   Toprol-XL; Take 1 tablet (50 mg) by mouth once daily. Do not crush or   chew.; Start taking on: July 18, 2025   rosuvastatin 10 mg tablet; Commonly known as: Crestor; Take 1 tablet (10   mg) by mouth once daily.; Start taking on: July 18, 2025   zinc oxide 40 % ointment ointment; Apply 1 Application topically 3 times   a day.     CHANGE how you take these medications     amiodarone 200 mg tablet; Commonly known as: Pacerone; Take 1 tablet   (200 mg) by mouth 2 times a day.; What changed: when to take this   furosemide 40 mg tablet; Commonly known as: Lasix; Take 1 tablet (40 mg)   by mouth 2 times daily (morning and late afternoon).; What changed:   medication strength, when to take this, additional instructions, Another   medication with the same name was removed. Continue taking this   medication, and follow the directions you see here.     CONTINUE taking these medications     apixaban 2.5 mg tablet; Commonly known as: Eliquis; Take 1 tablet (2.5   mg) by mouth 2 times a day.   glipiZIDE XL 5 mg 24 hr tablet; Commonly known as: Glucotrol XL   Jardiance 25 mg tablet; Generic drug: empagliflozin   lansoprazole 15 mg DR capsule; Commonly known as: Prevacid   Lantus U-100 Insulin 100 unit/mL injection; Generic drug: insulin   glargine   levothyroxine 75 mcg tablet; Commonly known as: Synthroid, Levoxyl   lisinopril 10 mg tablet   mexiletine 150 mg capsule; Commonly known as: Mexitil; Take 1 capsule   (150 mg) by mouth every 12 hours.    oxyCODONE-acetaminophen 5-325 mg tablet; Commonly known as: Percocet;   Take 1 tablet by mouth every 8 hours if needed for severe pain (7 - 10)   for up to 3 days. Pt states he takes 1 to 2 tablets by mouth daily as   needed for pain   spironolactone 25 mg tablet; Commonly known as: Aldactone; Take 1 tablet   (25 mg) by mouth once every 24 hours.   tamsulosin 0.4 mg 24 hr capsule; Commonly known as: Flomax     STOP taking these medications     carvedilol 25 mg tablet; Commonly known as: Coreg   pantoprazole 40 mg EC tablet; Commonly known as: ProtoNix       Test Results Pending At Discharge  Pending Labs       No current pending labs.          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 He was started on IV amiodarone and admitted for further evaluation and workup. Cardiology and EP were consulted and his medications were adjusted as needed. He was transitioned to amiodarone 200mg BID, Mexiletine 150mg PO BID, Lasix 40mg BID, and metoprolol succinate 50mg daily. Device interrogation showed true VT with one shock. While admitted he did have runs of vtach overnight 7/14 and 7/15. BP was monitored and meds were adjusted as needed. TTE showed LVEF 20-25%, severely enlarged RV, severely dilated LA and RA, small pericardial effusion, and moderate MR, TR, and UT. Electrolytes were replenished as needed with K >4 and Mg > 2. He was given Lantus with SSI for his DM. Wound care was consulted for left buttock wound and recommended zinc, medihoney, and aqucel for left hip and ankle wound. Endocrinology was consulted for abnormal thyroid levels and recommends repeat of TSH in 4-6 weeks. PT/OT recommended moderate intensity level of care. Patient was discharged to  SNF in stable condition to follow up with EP and Advanced Heart Failure.     Pertinent Physical Exam At Time of Discharge  Physical Exam  Vitals reviewed.   Constitutional:       General: He is not in acute distress.    Cardiovascular:      Rate and Rhythm: Normal rate and regular rhythm.      Heart sounds: Heart sounds are distant.   Pulmonary:      Effort: Pulmonary effort is normal. No respiratory distress.      Breath sounds: Examination of the right-lower field reveals decreased breath sounds. Examination of the left-lower field reveals decreased breath sounds. Decreased breath sounds present. No wheezing or rhonchi.   Abdominal:      General: Abdomen is flat. There is no distension.      Palpations: Abdomen is soft.     Musculoskeletal:      Right lower leg: Edema present.      Left lower leg: Edema present.     Skin:     Comments: Left arm and hand swelling improved from yesterday. Still some bruising and redness. Normal ROM     Neurological:      Mental Status: He is alert and oriented to person, place, and time.     Psychiatric:         Mood and Affect: Mood normal.         Behavior: Behavior normal.         Thought Content: Thought content normal.       Outpatient Follow-Up  Future Appointments   Date Time Provider Department Center   8/25/2025  2:40 PM MD DEB [facility]   9/12/2025  8:00 AM MD DEB [facility]   1/15/2026  2:20 PM Preston Elliott MD [address]     ANALI MORALES-S    Attending attestation:  Patient was seen and evaluated with the PA student. Admitted for Vtach s/p shock and was treated by cardiology team with adjustment of cardiac medications and need for outpatient EP and advanced heart failure follow up. Patient was seen by PT/OT and recommended for discharge to SNF with close outpatient follow up.    Medically cleared for discharge. Medications reviewed and reconciled. Scripts prepared as needed.  Discussed with the family, , and  . Questions answered. Understanding verbalized. Overall time spent on discharge was longer than 35 min.

## 2025-07-17 NOTE — PROGRESS NOTES
"Occupational Therapy    Evaluation    Patient Name: Valentin Rojas \"Sabino\"  MRN: [id number]  Department: [address]  Room: [address] [place] [address]  Today's Date: 7/17/2025  Time Calculation  Start Time: 1131  Stop Time: 1145  Time Calculation (min): 14 min    Assessment  IP OT Assessment  OT Assessment: Pt presents with decreased ADL performance, decreased functional mobility, decreased endurance. Recommend continued skilled OT at a mod intensity to maximize pt safety and independence after discharge.  Prognosis: Good  Barriers to Discharge Home: Caregiver assistance, Physical needs  Caregiver Assistance: Caregiver assistance needed per identified barriers - however, level of patient's required assistance exceeds assistance available at home  Physical Needs: 24hr mobility assistance needed, 24hr ADL assistance needed, High falls risk due to function or environment  Evaluation/Treatment Tolerance: Patient tolerated treatment well  Medical Staff Made Aware: Yes  End of Session Communication: Bedside nurse  End of Session Patient Position: Up in chair, Alarm on  Plan:  Treatment Interventions: ADL retraining, Functional transfer training, Endurance training, UE strengthening/ROM  OT Frequency: 3 times per week (During this acute inpatient hospitalization. Based on current functional status & rehab potential, patient is anticipated to tolerate and benefit from 5+ days/wk of skilled rehabilitative therapy after discharge from this acute inpatient hospitalization.)  OT Discharge Recommendations: Moderate intensity level of continued care  Equipment Recommended upon Discharge: Wheeled walker (owns)  OT Recommended Transfer Status: Assist of 1, Maximum assist  OT - OK to Discharge: Yes (per OT POC)    Subjective   Current Problem:  1. Ventricular tachycardia (paroxysmal)  Transthoracic Echo Complete    Transthoracic Echo Complete    acetaminophen (Tylenol) 650 mg suppository    amiodarone (Pacerone) 200 mg tablet    metoprolol succinate XL " (Toprol-XL) 50 mg 24 hr tablet    furosemide (Lasix) 40 mg tablet    rosuvastatin (Crestor) 10 mg tablet    oxyCODONE-acetaminophen (Percocet) 5-325 mg tablet    Monitor intake and output    Referral to Cardiology      2. Cardiomyopathy, nonischemic (Multi)  Transthoracic Echo Complete    Transthoracic Echo Complete      3. Acute on chronic systolic heart failure  Transthoracic Echo Complete    Transthoracic Echo Complete      4. Heart failure, unspecified  Transthoracic Echo Complete      5. Wound cellulitis  zinc oxide 40 % ointment ointment        OT Visit Info:  OT Received On: 07/17/25  General Visit Info:  General  Reason for Referral: 89 yo male referred to OT for impaired ADLs/mobility  Referred By: Neda Ramirez DO  Past Medical History Relevant to Rehab: medical history significant for T2DM, CKD3, HTN, HLD, GERD, BPH, Hypothyroidism, Complete heart block s/p pacemaker/ICD, Vtach s/p albations and ICD, Afib  Prior to Session Communication: Bedside nurse  Patient Position Received: Up in chair, Alarm on  General Comment: Pt pleasant, cooperative with OT evaluation  Precautions:  Medical Precautions: Fall precautions (external cath, IV, tele)     Date/Time Vitals Session Patient Position Pulse Resp SpO2 BP MAP (mmHg)    07/17/25 1202 --  --  70  15  100 %  107/75  --           Pain:  Pain Assessment  Pain Assessment: 0-10  0-10 (Numeric) Pain Score: 0 - No pain    Objective   Cognition:  Overall Cognitive Status: Within Functional Limits  Arousal/Alertness: Appropriate responses to stimuli  Orientation Level: Oriented X4           Home Living:  Type of Home: House  Lives With: Spouse  Home Adaptive Equipment: Walker rolling or standard, Cane (rollator)  Home Layout: One level  Home Access: Stairs to enter with rails  Entrance Stairs-Rails: Left  Entrance Stairs-Number of Steps: 3   Prior Function:  Level of [place]: Independent with ADLs and functional transfers, Independent with homemaking with  ambulation  Receives Help From: Family  ADL Assistance: Independent  Homemaking Assistance: Independent (shares with spouse)  Ambulatory Assistance: Independent (with rollator)    ADL:  Eating Assistance: Independent  Grooming Assistance: Stand by  Bathing Assistance: Moderate  UE Dressing Assistance: Stand by  LE Dressing Assistance: Moderate  Toileting Assistance with Device: Moderate  Functional Assistance: Maximal  ADL Comments: Demonstrates ability to reach distal UEs for LBD with significant exertion required. ADLs anticipated.  Activity Tolerance:  Endurance: Tolerates less than 10 min exercise, no significant change in vital signs  Bed Mobility/Transfers:      Transfers  Transfer: Yes  Transfer 1  Transfer From 1: Chair with arms to  Transfer to 1: Stand  Technique 1: Sit to stand, Stand to sit  Transfer Device 1: Walker  Transfer Level of Assistance 1: Maximum assistance  Trials/Comments 1: Significant assist to achieve full stand from low surface      Functional Mobility:  Functional Mobility  Functional Mobility Performed: No (Unable to progress past static stand d/t weakness)  Sitting Balance:  Static Sitting Balance  Static Sitting-Balance Support: Feet supported  Static Sitting-Level of Assistance: Moderate assistance  Static Sitting-Comment/Number of Minutes: Tolerated <1 minute of standing    Strength:  Strength Comments: BUE 5/5    Coordination:  Movements are Fluid and Coordinated: Yes   Hand Function:  Hand Function  Gross Grasp: Functional  Coordination: Functional  Extremities: RUE   RUE : Within Functional Limits and LUE   LUE: Within Functional Limits    Outcome Measures: [facility] Daily Activity  Putting on and taking off regular lower body clothing: Total  Bathing (including washing, rinsing, drying): A lot  Putting on and taking off regular upper body clothing: A little  Toileting, which includes using toilet, bedpan or urinal: Total  Taking care of personal grooming such as brushing teeth: A  little  Eating Meals: None  Daily Activity - Total Score: 14      Education Documentation  Precautions, taught by Marty Wong OT at 7/17/2025  1:11 PM.  Learner: Patient  Readiness: Acceptance  Method: Explanation  Response: Verbalizes Understanding    Body Mechanics, taught by Marty Wong OT at 7/17/2025  1:11 PM.  Learner: Patient  Readiness: Acceptance  Method: Explanation  Response: Verbalizes Understanding    ADL Training, taught by Marty Wong OT at 7/17/2025  1:11 PM.  Learner: Patient  Readiness: Acceptance  Method: Explanation  Response: Verbalizes Understanding    Education Comments  No comments found.      Goals:   Encounter Problems       Encounter Problems (Active)       OT Goals       Pt will demo LE ADL completion with min A, using AE if needed.        Start:  07/17/25    Expected End:  07/31/25            Pt will complete [id number] transfers using LRD in preparation for ADLs with Min A        Start:  07/17/25    Expected End:  07/31/25            Pt will tolerate 10min stand during functional task completion with no more than 1 rest break in order to increase endurance for functional task completion.        Start:  07/17/25    Expected End:  07/31/25            Pt will increase static/dynamic standing balance to Good to increase safety and independence with functional task completion.         Start:  07/17/25    Expected End:  07/31/25            Pt will demo and/or verbalize 2-3 energy conservation techniques to incorporate into functional mobility or ADL to improve performance and increase independence.        Start:  07/17/25    Expected End:  07/31/25

## 2025-07-25 ENCOUNTER — NURSING HOME VISIT (OUTPATIENT)
Dept: POST ACUTE CARE | Facility: EXTERNAL LOCATION | Age: 89
End: 2025-07-25
Payer: MEDICARE

## 2025-07-25 DIAGNOSIS — M25.562 CHRONIC PAIN OF LEFT KNEE: Primary | ICD-10-CM

## 2025-07-25 DIAGNOSIS — I10 PRIMARY HYPERTENSION: ICD-10-CM

## 2025-07-25 DIAGNOSIS — R53.81 PHYSICAL DECONDITIONING: ICD-10-CM

## 2025-07-25 DIAGNOSIS — I50.20 HEART FAILURE WITH REDUCED EJECTION FRACTION: ICD-10-CM

## 2025-07-25 DIAGNOSIS — N40.0 BENIGN PROSTATIC HYPERPLASIA WITHOUT LOWER URINARY TRACT SYMPTOMS: ICD-10-CM

## 2025-07-25 DIAGNOSIS — E11.9 TYPE 2 DIABETES MELLITUS WITHOUT COMPLICATION, WITH LONG-TERM CURRENT USE OF INSULIN: ICD-10-CM

## 2025-07-25 DIAGNOSIS — Z79.4 TYPE 2 DIABETES MELLITUS WITHOUT COMPLICATION, WITH LONG-TERM CURRENT USE OF INSULIN: ICD-10-CM

## 2025-07-25 DIAGNOSIS — R52 PAIN: ICD-10-CM

## 2025-07-25 DIAGNOSIS — G89.29 CHRONIC PAIN OF LEFT KNEE: Primary | ICD-10-CM

## 2025-07-25 DIAGNOSIS — E78.5 HYPERLIPIDEMIA, UNSPECIFIED HYPERLIPIDEMIA TYPE: Chronic | ICD-10-CM

## 2025-07-25 DIAGNOSIS — E03.9 HYPOTHYROIDISM, ACQUIRED: ICD-10-CM

## 2025-07-25 DIAGNOSIS — K21.9 GASTROESOPHAGEAL REFLUX DISEASE, UNSPECIFIED WHETHER ESOPHAGITIS PRESENT: ICD-10-CM

## 2025-07-25 DIAGNOSIS — I47.20 VENTRICULAR TACHYCARDIA (PAROXYSMAL): ICD-10-CM

## 2025-07-25 DIAGNOSIS — Z95.810 CARDIAC DEFIBRILLATOR IN PLACE: Primary | ICD-10-CM

## 2025-07-25 DIAGNOSIS — N18.9 CHRONIC KIDNEY DISEASE, UNSPECIFIED CKD STAGE: ICD-10-CM

## 2025-07-25 PROCEDURE — 99309 SBSQ NF CARE MODERATE MDM 30: CPT | Performed by: NURSE PRACTITIONER

## 2025-07-25 RX ORDER — OXYCODONE AND ACETAMINOPHEN 5; 325 MG/1; MG/1
1 TABLET ORAL EVERY 8 HOURS PRN
Qty: 90 TABLET | Refills: 0 | Status: SHIPPED | OUTPATIENT
Start: 2025-07-25

## 2025-07-25 NOTE — LETTER
"Patient: Sabino Rojas  : 1935    Encounter Date: 2025    Patient ID: Valentin Rojas \"Nicanor" is a 89 y.o. male who is acute skilled care being seen and evaluated for multiple medical problems.  [id number]   1935    /78   Pulse 71   Temp 36.4 °C (97.6 °F)   Resp 15   SpO2 96%     Assessment/Plan  Problem List Items Addressed This Visit       Cardiac defibrillator in place - Primary    PMH         CKD (chronic kidney disease)    Avoid NSAIDs  Monitor renal panel  2025 BUN/creatinine 37/2.11, potassium 4.4, hemoglobin 13.9  2025 repeat BMP and CBC with differential         Heart failure with reduced ejection fraction    Lasix 40 mg by mouth twice daily  Spironolactone 25 mg by mouth daily         Hyperlipidemia (Chronic)    Rosuvastatin 10 mg by mouth every at bedtime         Hypothyroidism, acquired    Levothyroxine 75 mcg by mouth daily         Primary hypertension    Lisinopril 10 mg by mouth daily  Metoprolol succinate 50 mg by mouth daily         Diabetes mellitus, type 2 (Multi)    Glipizide ER 5 mg by mouth daily  Jardiance 25 mg by mouth daily  Lantus 11 units subcu every at bedtime  Glucose checks twice daily         Ventricular tachycardia (paroxysmal)    Amiodarone 200 mg by mouth twice daily  Apixaban 2.5 mg by mouth twice daily  Metoprolol succinate 50 mg by mouth daily  Mexiletine 150 mg by mouth BID   Follow-up appointment with cardiology on 2025         Benign prostatic hyperplasia without lower urinary tract symptoms    Flomax 0.4 mg by mouth every at bedtime         Pain    Acetaminophen 650 mg by mouth every 6 hours as needed  Percocet 5/325 mg by mouth every 8 hours as needed (home medication)         Gastroesophageal reflux disease    Lansoprazole 15 mg by mouth daily as needed         Physical deconditioning    PT/OT              HPI: Client was admitted at [facility] from 2025 until 2025 with a final diagnosis of acute on chronic " heart failure and ventricular tachycardia status post ICD discharge.  TTE showed ejection fraction 20 to 25%.  IV Amiodarone then oral Amiodarone/ Metoprolol/ Mexiletine. HF- Lasix and Spironolactone.  Carvedilol and Protonix discontinued.  Client continues to receive PT/OT services.  He denies headache or dizziness.  He denies chest pain or shortness of breath.  No ICD discharges at this facility.  He denies current abdominal pain, nausea/vomiting, or diarrhea.  He states his appetite is fair.  He denies dysuria.  No complaints of insomnia.  He complains of chronic pain-left hip and left knee.  He takes Percocet at home.    Note completed with [employer].  Please excuse any grammatical errors..    Review of Systems ROS as described in HPI     Physical Exam  Constitutional:       Comments: Sitting in bed   HENT:      Mouth/Throat:      Mouth: Mucous membranes are moist.     Cardiovascular:      Rate and Rhythm: Normal rate.      Comments: AICD  Pulmonary:      Effort: Pulmonary effort is normal.      Comments: Room air  Abdominal:      General: Bowel sounds are normal.   Genitourinary:     Comments: Denies dysuria    Musculoskeletal:      Comments: PT/OT  +2 bilateral lower extremity/foot edema     Skin:     General: Skin is warm and dry.     Neurological:      Mental Status: He is alert. Mental status is at baseline.     Psychiatric:         Mood and Affect: Mood normal.      Comments: Cooperative with assessment                   Electronically Signed By: ANALI Carroll   7/27/25  6:58 AM

## 2025-07-27 ENCOUNTER — NURSING HOME VISIT (OUTPATIENT)
Dept: POST ACUTE CARE | Facility: EXTERNAL LOCATION | Age: 89
End: 2025-07-27
Payer: MEDICARE

## 2025-07-27 VITALS
SYSTOLIC BLOOD PRESSURE: 105 MMHG | DIASTOLIC BLOOD PRESSURE: 78 MMHG | HEART RATE: 71 BPM | RESPIRATION RATE: 15 BRPM | TEMPERATURE: 97.6 F | OXYGEN SATURATION: 96 %

## 2025-07-27 DIAGNOSIS — Z91.81 AT RISK FOR FALLS: ICD-10-CM

## 2025-07-27 DIAGNOSIS — I50.20 HEART FAILURE WITH REDUCED EJECTION FRACTION: ICD-10-CM

## 2025-07-27 DIAGNOSIS — I47.20 VENTRICULAR TACHYCARDIA (PAROXYSMAL): Primary | ICD-10-CM

## 2025-07-27 DIAGNOSIS — N18.9 CHRONIC KIDNEY DISEASE, UNSPECIFIED CKD STAGE: ICD-10-CM

## 2025-07-27 DIAGNOSIS — R53.1 WEAKNESS: ICD-10-CM

## 2025-07-27 DIAGNOSIS — E11.9 TYPE 2 DIABETES MELLITUS WITHOUT COMPLICATION, WITH LONG-TERM CURRENT USE OF INSULIN: ICD-10-CM

## 2025-07-27 DIAGNOSIS — E78.5 HYPERLIPIDEMIA, UNSPECIFIED HYPERLIPIDEMIA TYPE: ICD-10-CM

## 2025-07-27 DIAGNOSIS — Z79.4 TYPE 2 DIABETES MELLITUS WITHOUT COMPLICATION, WITH LONG-TERM CURRENT USE OF INSULIN: ICD-10-CM

## 2025-07-27 DIAGNOSIS — E03.9 HYPOTHYROIDISM, ACQUIRED: ICD-10-CM

## 2025-07-27 DIAGNOSIS — N40.0 BENIGN PROSTATIC HYPERPLASIA WITHOUT LOWER URINARY TRACT SYMPTOMS: ICD-10-CM

## 2025-07-27 DIAGNOSIS — I10 PRIMARY HYPERTENSION: ICD-10-CM

## 2025-07-27 PROBLEM — R52 PAIN: Status: ACTIVE | Noted: 2025-07-27

## 2025-07-27 PROBLEM — R07.9 CHEST PAIN, UNSPECIFIED TYPE: Status: RESOLVED | Noted: 2025-03-27 | Resolved: 2025-07-27

## 2025-07-27 PROBLEM — R53.81 PHYSICAL DECONDITIONING: Status: ACTIVE | Noted: 2025-07-27

## 2025-07-27 PROBLEM — R07.89 CHEST PRESSURE: Status: RESOLVED | Noted: 2025-03-26 | Resolved: 2025-07-27

## 2025-07-27 PROBLEM — R07.9 CHEST PAIN: Status: RESOLVED | Noted: 2024-08-31 | Resolved: 2025-07-27

## 2025-07-27 PROBLEM — K21.9 GASTROESOPHAGEAL REFLUX DISEASE: Status: ACTIVE | Noted: 2025-07-27

## 2025-07-27 PROCEDURE — 99309 SBSQ NF CARE MODERATE MDM 30: CPT | Performed by: INTERNAL MEDICINE

## 2025-07-27 NOTE — ASSESSMENT & PLAN NOTE
Glipizide ER 5 mg by mouth daily  Jardiance 25 mg by mouth daily  Lantus 11 units subcu every at bedtime  Glucose checks twice daily

## 2025-07-27 NOTE — LETTER
Patient: Sabino Rojas  : 1935    Encounter Date: 2025    PLACE OF SERVICE:  [facility].    This is a subsequent visit.    Subjective  Patient ID: Sabino Rojas is a 89 y.o. male who presents for Follow-up.    Mr. Valentin Rojas is an 89-year-old male with history of unstable ventricular tachycardia with multiple ICD discharges.  He suffers from heart failure and is unable to care for himself.  He requires supportive care.    Review of Systems   Constitutional:  Negative for chills and fever.   Cardiovascular:  Negative for chest pain.   All other systems reviewed and are negative.    Objective  /82   Pulse 82   Temp 36.7 °C (98.1 °F)   Resp 16     Physical Exam  Vitals reviewed.   Constitutional:       General: He is not in acute distress.     Comments: This is a well-developed, well-nourished male, sitting in a chair.   HENT:      Right Ear: Tympanic membrane, ear canal and external ear normal.      Left Ear: Tympanic membrane, ear canal and external ear normal.     Eyes:      General: No scleral icterus.     Pupils: Pupils are equal, round, and reactive to light.     Neck:      Vascular: No carotid bruit.     Cardiovascular:      Heart sounds: Normal heart sounds, S1 normal and S2 normal. No murmur heard.     No friction rub.   Pulmonary:      Effort: Pulmonary effort is normal.      Breath sounds: Normal breath sounds and air entry.   Abdominal:      Palpations: There is no hepatomegaly, splenomegaly or mass.     Musculoskeletal:         General: No swelling or deformity. Normal range of motion.      Cervical back: Neck supple.      Right lower leg: Edema present.      Left lower leg: Edema present.   Lymphadenopathy:      Cervical: No cervical adenopathy.      Upper Body:      Right upper body: No axillary adenopathy.      Left upper body: No axillary adenopathy.      Lower Body: No right inguinal adenopathy. No left inguinal adenopathy.     Neurological:       Mental Status: He is oriented to person, place, and time.      Cranial Nerves: Cranial nerves 2-12 are intact. No cranial nerve deficit.      Sensory: No sensory deficit.      Motor: Motor function is intact. No weakness.      Gait: Gait is intact.      Deep Tendon Reflexes: Reflexes normal.     Psychiatric:         Mood and Affect: Mood normal. Mood is not anxious or depressed. Affect is not angry.         Behavior: Behavior is not agitated.         Thought Content: Thought content normal.         Judgment: Judgment normal.     LAB WORK: Laboratory studies reviewed.    Assessment/Plan  Problem List Items Addressed This Visit           ICD-10-CM       Cardiac and Vasculature    Hyperlipidemia (Chronic) E78.5    Heart failure with reduced ejection fraction I50.20    Primary hypertension I10    Ventricular tachycardia (paroxysmal) - Primary I47.20       Endocrine/Metabolic    Hypothyroidism, acquired E03.9    Diabetes mellitus, type 2 (Multi) E11.9       Genitourinary and Reproductive    CKD (chronic kidney disease) N18.9    Benign prostatic hyperplasia without lower urinary tract symptoms N40.0     Other Visit Diagnoses         Codes      Weakness     R53.1      At risk for falls     Z91.81        1. Recent ventricular tachycardia with ICD, follow with Cardiology.  2. Heart failure, on diuretic.  3. CKD, monitor BMP.  4. Diabetes, on Jardiance and Lantus.  5. BPH, on Flomax.  6. Hypertension, on lisinopril.  7. Hyperlipidemia, on statin.  8. Hypothyroidism, on levothyroxine.  9. Weakness, on PT/OT.  10. Fall risk, fall precautions.    Scribe Attestation  By signing my name below, I, Sandra Miner attest that this documentation has been prepared under the direction and in the presence of Haile Tariq MD.       All medical record entries made by the scribe were personally dictated by me I have reviewed the chart and agree the record accurately reflects my personal performance of his history physical examination  and management      Electronically Signed By: Haile Tariq MD   8/2/25  1:50 AM

## 2025-07-27 NOTE — PROGRESS NOTES
"Patient ID: Valentin Rojas \"Nicanor" is a 89 y.o. male who is acute skilled care being seen and evaluated for multiple medical problems.  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    PMH         CKD (chronic kidney disease)    Avoid NSAIDs  Monitor renal panel  7/17/2025 BUN/creatinine 37/2.11, potassium 4.4, hemoglobin 13.9  7/28/2025 repeat BMP and CBC with differential         Heart failure with reduced ejection fraction    Lasix 40 mg by mouth twice daily  Spironolactone 25 mg by mouth daily         Hyperlipidemia (Chronic)    Rosuvastatin 10 mg by mouth every at bedtime         Hypothyroidism, acquired    Levothyroxine 75 mcg by mouth daily         Primary hypertension    Lisinopril 10 mg by mouth daily  Metoprolol succinate 50 mg by mouth daily         Diabetes mellitus, type 2 (Multi)    Glipizide ER 5 mg by mouth daily  Jardiance 25 mg by mouth daily  Lantus 11 units subcu every at bedtime  Glucose checks twice daily         Ventricular tachycardia (paroxysmal)    Amiodarone 200 mg by mouth twice daily  Apixaban 2.5 mg by mouth twice daily  Metoprolol succinate 50 mg by mouth daily  Mexiletine 150 mg by mouth BID   Follow-up appointment with cardiology on 8/25/2025         Benign prostatic hyperplasia without lower urinary tract symptoms    Flomax 0.4 mg by mouth every at bedtime         Pain    Acetaminophen 650 mg by mouth every 6 hours as needed  Percocet 5/325 mg by mouth every 8 hours as needed (home medication)         Gastroesophageal reflux disease    Lansoprazole 15 mg by mouth daily as needed         Physical deconditioning    PT/OT              HPI: Client was admitted at [facility] from 7/13/2025 until 7/17/2025 with a final diagnosis of acute on chronic heart failure and ventricular tachycardia status post ICD discharge.  TTE " showed ejection fraction 20 to 25%.  IV Amiodarone then oral Amiodarone/ Metoprolol/ Mexiletine. HF- Lasix and Spironolactone.  Carvedilol and Protonix discontinued.  Client continues to receive PT/OT services.  He denies headache or dizziness.  He denies chest pain or shortness of breath.  No ICD discharges at this facility.  He denies current abdominal pain, nausea/vomiting, or diarrhea.  He states his appetite is fair.  He denies dysuria.  No complaints of insomnia.  He complains of chronic pain-left hip and left knee.  He takes Percocet at home.    Note completed with Luisa.  Please excuse any grammatical errors..    Review of Systems ROS as described in HPI     Physical Exam  Constitutional:       Comments: Sitting in bed   HENT:      Mouth/Throat:      Mouth: Mucous membranes are moist.     Cardiovascular:      Rate and Rhythm: Normal rate.      Comments: AICD  Pulmonary:      Effort: Pulmonary effort is normal.      Comments: Room air  Abdominal:      General: Bowel sounds are normal.   Genitourinary:     Comments: Denies dysuria    Musculoskeletal:      Comments: PT/OT  +2 bilateral lower extremity/foot edema     Skin:     General: Skin is warm and dry.     Neurological:      Mental Status: He is alert. Mental status is at baseline.     Psychiatric:         Mood and Affect: Mood normal.      Comments: Cooperative with assessment

## 2025-07-27 NOTE — ASSESSMENT & PLAN NOTE
Avoid NSAIDs  Monitor renal panel  7/17/2025 BUN/creatinine 37/2.11, potassium 4.4, hemoglobin 13.9  7/28/2025 repeat BMP and CBC with differential

## 2025-07-27 NOTE — ASSESSMENT & PLAN NOTE
Acetaminophen 650 mg by mouth every 6 hours as needed  Percocet 5/325 mg by mouth every 8 hours as needed (home medication)

## 2025-07-27 NOTE — ASSESSMENT & PLAN NOTE
Amiodarone 200 mg by mouth twice daily  Apixaban 2.5 mg by mouth twice daily  Metoprolol succinate 50 mg by mouth daily  Mexiletine 150 mg by mouth BID   Follow-up appointment with cardiology on 8/25/2025

## 2025-07-28 VITALS
HEART RATE: 82 BPM | DIASTOLIC BLOOD PRESSURE: 82 MMHG | TEMPERATURE: 98.1 F | SYSTOLIC BLOOD PRESSURE: 126 MMHG | RESPIRATION RATE: 16 BRPM

## 2025-07-28 ASSESSMENT — ENCOUNTER SYMPTOMS
FEVER: 0
CHILLS: 0

## 2025-07-28 NOTE — PROGRESS NOTES
PLACE OF SERVICE:  [facility].    This is a subsequent visit.    Subjective   Patient ID: Sabino Rojas is a 89 y.o. male who presents for Follow-up.    Mr. Valentin Rojas is an 89-year-old male with history of unstable ventricular tachycardia with multiple ICD discharges.  He suffers from heart failure and is unable to care for himself.  He requires supportive care.    Review of Systems   Constitutional:  Negative for chills and fever.   Cardiovascular:  Negative for chest pain.   All other systems reviewed and are negative.    Objective   /82   Pulse 82   Temp 36.7 °C (98.1 °F)   Resp 16     Physical Exam  Vitals reviewed.   Constitutional:       General: He is not in acute distress.     Comments: This is a well-developed, well-nourished male, sitting in a chair.   HENT:      Right Ear: Tympanic membrane, ear canal and external ear normal.      Left Ear: Tympanic membrane, ear canal and external ear normal.     Eyes:      General: No scleral icterus.     Pupils: Pupils are equal, round, and reactive to light.     Neck:      Vascular: No carotid bruit.     Cardiovascular:      Heart sounds: Normal heart sounds, S1 normal and S2 normal. No murmur heard.     No friction rub.   Pulmonary:      Effort: Pulmonary effort is normal.      Breath sounds: Normal breath sounds and air entry.   Abdominal:      Palpations: There is no hepatomegaly, splenomegaly or mass.     Musculoskeletal:         General: No swelling or deformity. Normal range of motion.      Cervical back: Neck supple.      Right lower leg: Edema present.      Left lower leg: Edema present.   Lymphadenopathy:      Cervical: No cervical adenopathy.      Upper Body:      Right upper body: No axillary adenopathy.      Left upper body: No axillary adenopathy.      Lower Body: No right inguinal adenopathy. No left inguinal adenopathy.     Neurological:      Mental Status: He is oriented to person, place, and time.       Cranial Nerves: Cranial nerves 2-12 are intact. No cranial nerve deficit.      Sensory: No sensory deficit.      Motor: Motor function is intact. No weakness.      Gait: Gait is intact.      Deep Tendon Reflexes: Reflexes normal.     Psychiatric:         Mood and Affect: Mood normal. Mood is not anxious or depressed. Affect is not angry.         Behavior: Behavior is not agitated.         Thought Content: Thought content normal.         Judgment: Judgment normal.     LAB WORK: Laboratory studies reviewed.    Assessment/Plan   Problem List Items Addressed This Visit           ICD-10-CM       Cardiac and Vasculature    Hyperlipidemia (Chronic) E78.5    Heart failure with reduced ejection fraction I50.20    Primary hypertension I10    Ventricular tachycardia (paroxysmal) - Primary I47.20       Endocrine/Metabolic    Hypothyroidism, acquired E03.9    Diabetes mellitus, type 2 (Multi) E11.9       Genitourinary and Reproductive    CKD (chronic kidney disease) N18.9    Benign prostatic hyperplasia without lower urinary tract symptoms N40.0     Other Visit Diagnoses         Codes      Weakness     R53.1      At risk for falls     Z91.81        1. Recent ventricular tachycardia with ICD, follow with Cardiology.  2. Heart failure, on diuretic.  3. CKD, monitor BMP.  4. Diabetes, on Jardiance and Lantus.  5. BPH, on Flomax.  6. Hypertension, on lisinopril.  7. Hyperlipidemia, on statin.  8. Hypothyroidism, on levothyroxine.  9. Weakness, on PT/OT.  10. Fall risk, fall precautions.    Scribe Attestation  By signing my name below, IPaula Scribe attest that this documentation has been prepared under the direction and in the presence of Haile Tariq MD.       All medical record entries made by the scribe were personally dictated by me I have reviewed the chart and agree the record accurately reflects my personal performance of his history physical examination and management

## 2025-08-01 ENCOUNTER — APPOINTMENT (OUTPATIENT)
Dept: RADIOLOGY | Facility: HOSPITAL | Age: 89
End: 2025-08-01
Payer: MEDICARE

## 2025-08-01 ENCOUNTER — HOSPITAL ENCOUNTER (EMERGENCY)
Facility: HOSPITAL | Age: 89
Discharge: NURSING, REHAB OR LONG-TERM CARE | End: 2025-08-02
Payer: MEDICARE

## 2025-08-01 DIAGNOSIS — M11.20 PSEUDOGOUT: Primary | ICD-10-CM

## 2025-08-01 DIAGNOSIS — M25.531 RIGHT WRIST PAIN: ICD-10-CM

## 2025-08-01 LAB
ALBUMIN SERPL BCP-MCNC: 3.2 G/DL (ref 3.4–5)
ALP SERPL-CCNC: 110 U/L (ref 33–136)
ALT SERPL W P-5'-P-CCNC: 25 U/L (ref 10–52)
ANION GAP SERPL CALC-SCNC: 16 MMOL/L (ref 10–20)
AST SERPL W P-5'-P-CCNC: 26 U/L (ref 9–39)
BASOPHILS # BLD AUTO: 0 X10*3/UL (ref 0–0.1)
BASOPHILS NFR BLD AUTO: 0 %
BILIRUB SERPL-MCNC: 2 MG/DL (ref 0–1.2)
BUN SERPL-MCNC: 45 MG/DL (ref 6–23)
CALCIUM SERPL-MCNC: 8.3 MG/DL (ref 8.6–10.3)
CHLORIDE SERPL-SCNC: 99 MMOL/L (ref 98–107)
CO2 SERPL-SCNC: 27 MMOL/L (ref 21–32)
CREAT SERPL-MCNC: 2.35 MG/DL (ref 0.5–1.3)
EGFRCR SERPLBLD CKD-EPI 2021: 26 ML/MIN/1.73M*2
EOSINOPHIL # BLD AUTO: 0.01 X10*3/UL (ref 0–0.4)
EOSINOPHIL NFR BLD AUTO: 0.2 %
ERYTHROCYTE [DISTWIDTH] IN BLOOD BY AUTOMATED COUNT: 18.7 % (ref 11.5–14.5)
GLUCOSE SERPL-MCNC: 114 MG/DL (ref 74–99)
HCT VFR BLD AUTO: 47.2 % (ref 41–52)
HGB BLD-MCNC: 15.1 G/DL (ref 13.5–17.5)
IMM GRANULOCYTES # BLD AUTO: 0.02 X10*3/UL (ref 0–0.5)
IMM GRANULOCYTES NFR BLD AUTO: 0.3 % (ref 0–0.9)
LACTATE SERPL-SCNC: 1.7 MMOL/L (ref 0.4–2)
LYMPHOCYTES # BLD AUTO: 0.38 X10*3/UL (ref 0.8–3)
LYMPHOCYTES NFR BLD AUTO: 6.6 %
MAGNESIUM SERPL-MCNC: 2.38 MG/DL (ref 1.6–2.4)
MCH RBC QN AUTO: 28.5 PG (ref 26–34)
MCHC RBC AUTO-ENTMCNC: 32 G/DL (ref 32–36)
MCV RBC AUTO: 89 FL (ref 80–100)
MONOCYTES # BLD AUTO: 0.37 X10*3/UL (ref 0.05–0.8)
MONOCYTES NFR BLD AUTO: 6.5 %
NEUTROPHILS # BLD AUTO: 4.94 X10*3/UL (ref 1.6–5.5)
NEUTROPHILS NFR BLD AUTO: 86.4 %
NRBC BLD-RTO: 0.3 /100 WBCS (ref 0–0)
PLATELET # BLD AUTO: 97 X10*3/UL (ref 150–450)
POTASSIUM SERPL-SCNC: 4.1 MMOL/L (ref 3.5–5.3)
PROT SERPL-MCNC: 6.1 G/DL (ref 6.4–8.2)
RBC # BLD AUTO: 5.3 X10*6/UL (ref 4.5–5.9)
SODIUM SERPL-SCNC: 138 MMOL/L (ref 136–145)
WBC # BLD AUTO: 5.7 X10*3/UL (ref 4.4–11.3)

## 2025-08-01 PROCEDURE — 99285 EMERGENCY DEPT VISIT HI MDM: CPT | Mod: 25

## 2025-08-01 PROCEDURE — 73110 X-RAY EXAM OF WRIST: CPT | Mod: RT

## 2025-08-01 PROCEDURE — 73130 X-RAY EXAM OF HAND: CPT | Mod: RIGHT SIDE | Performed by: STUDENT IN AN ORGANIZED HEALTH CARE EDUCATION/TRAINING PROGRAM

## 2025-08-01 PROCEDURE — 36415 COLL VENOUS BLD VENIPUNCTURE: CPT

## 2025-08-01 PROCEDURE — 84075 ASSAY ALKALINE PHOSPHATASE: CPT

## 2025-08-01 PROCEDURE — 87205 SMEAR GRAM STAIN: CPT | Mod: GENLAB

## 2025-08-01 PROCEDURE — 2500000004 HC RX 250 GENERAL PHARMACY W/ HCPCS (ALT 636 FOR OP/ED)

## 2025-08-01 PROCEDURE — 73080 X-RAY EXAM OF ELBOW: CPT | Mod: RT

## 2025-08-01 PROCEDURE — 73080 X-RAY EXAM OF ELBOW: CPT | Mod: RIGHT SIDE | Performed by: STUDENT IN AN ORGANIZED HEALTH CARE EDUCATION/TRAINING PROGRAM

## 2025-08-01 PROCEDURE — 85025 COMPLETE CBC W/AUTO DIFF WBC: CPT

## 2025-08-01 PROCEDURE — 87075 CULTR BACTERIA EXCEPT BLOOD: CPT | Mod: GENLAB,59

## 2025-08-01 PROCEDURE — 93971 EXTREMITY STUDY: CPT | Performed by: STUDENT IN AN ORGANIZED HEALTH CARE EDUCATION/TRAINING PROGRAM

## 2025-08-01 PROCEDURE — 93971 EXTREMITY STUDY: CPT

## 2025-08-01 PROCEDURE — 96374 THER/PROPH/DIAG INJ IV PUSH: CPT

## 2025-08-01 PROCEDURE — 83735 ASSAY OF MAGNESIUM: CPT

## 2025-08-01 PROCEDURE — 93971 EXTREMITY STUDY: CPT | Performed by: RADIOLOGY

## 2025-08-01 PROCEDURE — 73110 X-RAY EXAM OF WRIST: CPT | Mod: RIGHT SIDE | Performed by: STUDENT IN AN ORGANIZED HEALTH CARE EDUCATION/TRAINING PROGRAM

## 2025-08-01 PROCEDURE — 83605 ASSAY OF LACTIC ACID: CPT

## 2025-08-01 PROCEDURE — 73130 X-RAY EXAM OF HAND: CPT | Mod: RT

## 2025-08-01 RX ORDER — DEXAMETHASONE SODIUM PHOSPHATE 10 MG/ML
10 INJECTION INTRAMUSCULAR; INTRAVENOUS ONCE
Status: COMPLETED | OUTPATIENT
Start: 2025-08-01 | End: 2025-08-01

## 2025-08-01 RX ORDER — PREDNISONE 20 MG/1
20 TABLET ORAL 2 TIMES DAILY
Qty: 10 TABLET | Refills: 0 | Status: SHIPPED | OUTPATIENT
Start: 2025-08-01 | End: 2025-08-06

## 2025-08-01 RX ADMIN — DEXAMETHASONE SODIUM PHOSPHATE 10 MG: 10 INJECTION INTRAMUSCULAR; INTRAVENOUS at 22:36

## 2025-08-01 ASSESSMENT — PAIN SCALES - GENERAL
PAINLEVEL_OUTOF10: 9
PAINLEVEL_OUTOF10: 6
PAINLEVEL_OUTOF10: 4

## 2025-08-01 ASSESSMENT — PAIN DESCRIPTION - PAIN TYPE: TYPE: ACUTE PAIN

## 2025-08-01 ASSESSMENT — PAIN - FUNCTIONAL ASSESSMENT: PAIN_FUNCTIONAL_ASSESSMENT: 0-10

## 2025-08-01 ASSESSMENT — PAIN DESCRIPTION - LOCATION: LOCATION: HAND

## 2025-08-01 NOTE — ED TRIAGE NOTES
Pt brought in by ems from [facility] for R hand pain, R hand is red, swollen and warm to the touch, wound noted on R elbow

## 2025-08-01 NOTE — ED PROVIDER NOTES
HPI   Chief Complaint   Patient presents with    Hand Pain       Patient is an 89-year-old male with significant history of A-fib on Eliquis, diabetes, CHF, CKD, hypertension, hypothyroidism presents to the ED for swelling in the right upper extremity today.  Patient is endorsing right hand pain.  Patient states pain is worsened with movement.  Patient denies any known injury states a couple weeks ago he woke up and had a wound right below his right elbow and has been getting treated for this.  Patient was started on Cipro on the 7/31/2026.  Patient denies any known fever or chills.  Patient denies any chest pain shortness of breath nausea vomiting abdominal pain diarrhea constipation numbness or tingling.  Denies any history of blood clots recent travel or surgery.  Denies any tobacco alcohol or street drug abuse.              Patient History   Medical History[1]  Surgical History[2]  Family History[3]  Social History[4]    Physical Exam   ED Triage Vitals [08/01/25 1256]   Temperature Heart Rate Respirations BP   36.8 °C (98.2 °F) 72 16 111/76      Pulse Ox Temp Source Heart Rate Source Patient Position   95 % Temporal -- --      BP Location FiO2 (%)     -- --       Physical Exam  HENT:      Head: Normocephalic.     Cardiovascular:      Rate and Rhythm: Normal rate and regular rhythm.      Pulses: Normal pulses.   Pulmonary:      Effort: Pulmonary effort is normal.      Breath sounds: No wheezing, rhonchi or rales.   Abdominal:      Palpations: Abdomen is soft.      Tenderness: There is no abdominal tenderness. There is no guarding or rebound.     Musculoskeletal:         General: Tenderness present.      Comments: Decreased flexion and extension of the right wrist.  Pain on palpation to the dorsum of the right hand forearm.  Patient is able to flex and extend the right elbow and right shoulder as well as abduct adduct the right shoulder.  Painful making a fist.  Rest of MSK is unremarkable     Skin:     Capillary  Refill: Capillary refill takes less than 2 seconds.      Findings: Erythema and lesion present.     Neurological:      Mental Status: He is alert.                 ED Course & MDM   Diagnoses as of 08/01/25 1625   Pseudogout   Right wrist pain                 No data recorded                                 Medical Decision Making  Medical Decision Making:  Patient presented as described in HPI. Patient case including ROS, PE, and treatment and plan discussed with ED attending if attached as cosigner. Due to patients presentation orders completed include as documented.  Patient presents to the ED for right arm swelling and pain.  Pending labs and imaging.  Kidney function similar to previous.  No leukocytosis.  Lactate is normal.  Ultrasound of the upper extremity is negative.  X-ray of the wrist shows no acute fracture or dislocation there is some joint space narrowing with chondrocalcinosis.  Rest of x-rays are unremarkable.  Patient educated on these findings.  Educated this is likely pseudogout do not see any obvious cellulitis on the arm there is some redness at the wrist and hand area.  Patient given steroids here and discharged home with steroids educated follow-up with orthopedics and placed referral educated on any worsening symptoms to return.  Patient zachary stable and discharged  Patient was advised to follow up with PCP or recommended provider in 2-3 days for another evaluation and exam. I advised patient/guardian to return or go to closest emergency room immediately if symptoms change, get worse, new symptoms develop prior to follow up. If there is no improvement in symptoms in the next 24 hours they are advised to return for further evaluation and exam. I also explained the plan and treatment course. Patient/guardian is in agreement with plan, treatment course, and follow up and states verbally that they will comply.        Patient care discussed with: N/A  Social Determinants affecting care:  N/A    Final diagnosis and disposition as below.  See CI    Homegoing. I discussed the differential; results and discharge plan with the patient and/or family/friend/caregiver if present.  I emphasized the importance of follow-up with the physician I referred them to in the timeframe recommended.  I explained reasons for the patient to return to the Emergency Department. They agreed that if they feel their condition is worsening or if they have any other concern they should call 911 immediately for further assistance. I gave the patient an opportunity to ask all questions they had and answered all of them accordingly. They understand return precautions and discharge instructions. The patient and/or family/friend/caregiver expressed understanding verbally and that they would comply.       Disposition:  Discharge      This note has been transcribed using voice recognition and may contain grammatical errors, misplaced words, incorrect words, incorrect phrases or other errors.        Vascular US upper extremity venous duplex right   Final Result   No evidence of right upper extremity venous thrombosis.             MACRO:   None.        Signed by: Justus Clement 8/1/2025 4:07 PM   Dictation workstation:   [id number]      Vascular US lower extremity venous duplex right   Final Result   1. No evidence of deep venous thrombus in the evaluated veins of the   right leg from the inguinal ligament to the popliteal fossa.        Signed by: Bj Cortes 8/1/2025 2:25 PM   Dictation workstation:   [id number]      XR wrist right 3+ views   Final Result   No evidence of acute fracture or dislocation.        MACRO:   None        Signed by: Beni Sherman 8/1/2025 1:45 PM   Dictation workstation:   [id number]      XR hand right 3+ views   Final Result   No evidence of acute fracture or dislocation.        MACRO:   None        Signed by: Beni Sherman 8/1/2025 1:45 PM   Dictation workstation:   [id number]      XR elbow right 3+  views   Final Result   No evidence of acute fracture or dislocation.        MACRO:   None        Signed by: Beni Sherman 8/1/2025 1:45 PM   Dictation workstation:   [id number]         Labs Reviewed   CBC WITH AUTO DIFFERENTIAL - Abnormal       Result Value    WBC 5.7      nRBC 0.3 (*)     RBC 5.30      Hemoglobin 15.1      Hematocrit 47.2      MCV 89      MCH 28.5      MCHC 32.0      RDW 18.7 (*)     Platelets 97 (*)     Neutrophils % 86.4      Immature Granulocytes %, Automated 0.3      Lymphocytes % 6.6      Monocytes % 6.5      Eosinophils % 0.2      Basophils % 0.0      Neutrophils Absolute 4.94      Immature Granulocytes Absolute, Automated 0.02      Lymphocytes Absolute 0.38 (*)     Monocytes Absolute 0.37      Eosinophils Absolute 0.01      Basophils Absolute 0.00     COMPREHENSIVE METABOLIC PANEL - Abnormal    Glucose 114 (*)     Sodium 138      Potassium 4.1      Chloride 99      Bicarbonate 27      Anion Gap 16      Urea Nitrogen 45 (*)     Creatinine 2.35 (*)     eGFR 26 (*)     Calcium 8.3 (*)     Albumin 3.2 (*)     Alkaline Phosphatase 110      Total Protein 6.1 (*)     AST 26      Bilirubin, Total 2.0 (*)     ALT 25     LACTATE - Normal    Lactate 1.7      Narrative:     Venipuncture immediately after or during the administration of Metamizole may lead to falsely low results. Testing should be performed immediately prior to Metamizole dosing.   MAGNESIUM - Normal    Magnesium 2.38     TISSUE/WOUND CULTURE/SMEAR   BLOOD CULTURE   BLOOD CULTURE      Procedure  Procedures         [1]   Past Medical History:  Diagnosis Date    At risk for falls     BPH (benign prostatic hyperplasia)     CHF (congestive heart failure)     CKD (chronic kidney disease)     Diabetes mellitus (Multi)     Hyperlipidemia     Hypertension     Hypotension     Hypothyroidism     Ventricular tachycardia (Multi)     Weakness    [2] History reviewed. No pertinent surgical history.  [3]   Family History  Problem Relation Name Age of  Onset    Hypertension Other     [4]   Social History  Tobacco Use    Smoking status: Never     Passive exposure: Never    Smokeless tobacco: Never   Substance Use Topics    Alcohol use: Never    Drug use: Never        María Harmon PA-C  08/01/25 5483

## 2025-08-02 ENCOUNTER — NURSING HOME VISIT (OUTPATIENT)
Dept: POST ACUTE CARE | Facility: EXTERNAL LOCATION | Age: 89
End: 2025-08-02
Payer: MEDICARE

## 2025-08-02 VITALS
OXYGEN SATURATION: 97 % | HEART RATE: 67 BPM | DIASTOLIC BLOOD PRESSURE: 76 MMHG | WEIGHT: 180 LBS | BODY MASS INDEX: 24.38 KG/M2 | HEIGHT: 72 IN | TEMPERATURE: 98.1 F | SYSTOLIC BLOOD PRESSURE: 122 MMHG | RESPIRATION RATE: 16 BRPM

## 2025-08-02 DIAGNOSIS — N18.9 CHRONIC KIDNEY DISEASE, UNSPECIFIED CKD STAGE: ICD-10-CM

## 2025-08-02 DIAGNOSIS — E11.9 TYPE 2 DIABETES MELLITUS WITHOUT COMPLICATION, WITH LONG-TERM CURRENT USE OF INSULIN: ICD-10-CM

## 2025-08-02 DIAGNOSIS — N40.0 BENIGN PROSTATIC HYPERPLASIA WITHOUT LOWER URINARY TRACT SYMPTOMS: ICD-10-CM

## 2025-08-02 DIAGNOSIS — I47.20 VENTRICULAR TACHYCARDIA (PAROXYSMAL): ICD-10-CM

## 2025-08-02 DIAGNOSIS — Z91.81 AT RISK FOR FALLS: ICD-10-CM

## 2025-08-02 DIAGNOSIS — I50.20 HEART FAILURE WITH REDUCED EJECTION FRACTION: ICD-10-CM

## 2025-08-02 DIAGNOSIS — Z79.4 TYPE 2 DIABETES MELLITUS WITHOUT COMPLICATION, WITH LONG-TERM CURRENT USE OF INSULIN: ICD-10-CM

## 2025-08-02 DIAGNOSIS — E03.9 HYPOTHYROIDISM, ACQUIRED: ICD-10-CM

## 2025-08-02 DIAGNOSIS — R53.1 WEAKNESS: ICD-10-CM

## 2025-08-02 DIAGNOSIS — I10 PRIMARY HYPERTENSION: ICD-10-CM

## 2025-08-02 DIAGNOSIS — M11.222 PSEUDOGOUT OF LEFT ELBOW: Primary | ICD-10-CM

## 2025-08-02 DIAGNOSIS — E78.5 HYPERLIPIDEMIA, UNSPECIFIED HYPERLIPIDEMIA TYPE: ICD-10-CM

## 2025-08-02 PROCEDURE — 2500000001 HC RX 250 WO HCPCS SELF ADMINISTERED DRUGS (ALT 637 FOR MEDICARE OP): Performed by: EMERGENCY MEDICINE

## 2025-08-02 PROCEDURE — 99309 SBSQ NF CARE MODERATE MDM 30: CPT | Performed by: INTERNAL MEDICINE

## 2025-08-02 RX ORDER — OXYCODONE HYDROCHLORIDE 5 MG/1
5 TABLET ORAL ONCE
Refills: 0 | Status: COMPLETED | OUTPATIENT
Start: 2025-08-02 | End: 2025-08-02

## 2025-08-02 RX ADMIN — OXYCODONE 5 MG: 5 TABLET ORAL at 00:47

## 2025-08-02 ASSESSMENT — PAIN SCALES - GENERAL
PAINLEVEL_OUTOF10: 6
PAINLEVEL_OUTOF10: 3

## 2025-08-03 LAB
BACTERIA BLD CULT: NORMAL
BACTERIA BLD CULT: NORMAL
BACTERIA SPEC CULT: ABNORMAL
BACTERIA SPEC CULT: ABNORMAL
GRAM STN SPEC: ABNORMAL
GRAM STN SPEC: ABNORMAL

## 2025-08-04 LAB
BACTERIA SPEC CULT: ABNORMAL
BACTERIA SPEC CULT: ABNORMAL
GRAM STN SPEC: ABNORMAL
GRAM STN SPEC: ABNORMAL

## 2025-08-05 ENCOUNTER — RESULTS FOLLOW-UP (OUTPATIENT)
Dept: PHARMACY | Facility: HOSPITAL | Age: 89
End: 2025-08-05
Payer: MEDICARE

## 2025-08-05 NOTE — PROGRESS NOTES
EDPD Note: Lab/Chart Reviewed    Reviewed Mr./Mrs./Ms. Valentin Rojas 's chart regarding a positive tissue culture/result that was taken during their recent emergency room visit. The patient was transferred back to their rehab/LTC facility .Therefore, I have faxed this information to [facility] at fax number [phone]. Spoke with Candace    Susceptibility data from last 90 days.  Collected Specimen Info Organism Ceftriaxone Ciprofloxacin Clindamycin Erythromycin Gentamicin Oxacillin Penicillin Tetracycline Trimethoprim/Sulfamethoxazole Vancomycin   08/01/25 Tissue/Biopsy from Wound/Tissue Methicillin Susceptible Staphylococcus aureus (MSSA)    R  R   S   R  S  S     Corynebacterium striatum group  R  R    I   R  R   S       No further follow up needed from EDPD Team.     Abbey Quinn, JoseD

## 2025-08-06 LAB
BACTERIA BLD CULT: NORMAL
BACTERIA BLD CULT: NORMAL

## 2025-08-07 LAB
Q ONSET: 189 MS
QRS COUNT: 22 BEATS
QRS DURATION: 178 MS
QT INTERVAL: 406 MS
QTC CALCULATION(BAZETT): 615 MS
QTC FREDERICIA: 535 MS
R AXIS: 124 DEGREES
T AXIS: -64 DEGREES
T OFFSET: 392 MS
VENTRICULAR RATE: 138 BPM

## 2025-08-08 ENCOUNTER — NURSING HOME VISIT (OUTPATIENT)
Dept: POST ACUTE CARE | Facility: EXTERNAL LOCATION | Age: 89
End: 2025-08-08
Payer: MEDICARE

## 2025-08-08 DIAGNOSIS — R53.81 PHYSICAL DECONDITIONING: ICD-10-CM

## 2025-08-08 DIAGNOSIS — E11.9 TYPE 2 DIABETES MELLITUS WITHOUT COMPLICATION, WITH LONG-TERM CURRENT USE OF INSULIN: ICD-10-CM

## 2025-08-08 DIAGNOSIS — R52 PAIN: ICD-10-CM

## 2025-08-08 DIAGNOSIS — K21.9 GASTROESOPHAGEAL REFLUX DISEASE, UNSPECIFIED WHETHER ESOPHAGITIS PRESENT: ICD-10-CM

## 2025-08-08 DIAGNOSIS — N18.9 CHRONIC KIDNEY DISEASE, UNSPECIFIED CKD STAGE: ICD-10-CM

## 2025-08-08 DIAGNOSIS — I50.20 HEART FAILURE WITH REDUCED EJECTION FRACTION: ICD-10-CM

## 2025-08-08 DIAGNOSIS — I47.20 VENTRICULAR TACHYCARDIA (PAROXYSMAL): ICD-10-CM

## 2025-08-08 DIAGNOSIS — I10 PRIMARY HYPERTENSION: ICD-10-CM

## 2025-08-08 DIAGNOSIS — Z79.4 TYPE 2 DIABETES MELLITUS WITHOUT COMPLICATION, WITH LONG-TERM CURRENT USE OF INSULIN: ICD-10-CM

## 2025-08-08 DIAGNOSIS — Z95.810 CARDIAC DEFIBRILLATOR IN PLACE: Primary | ICD-10-CM

## 2025-08-08 DIAGNOSIS — M11.20 PSEUDOGOUT: ICD-10-CM

## 2025-08-08 PROCEDURE — 99308 SBSQ NF CARE LOW MDM 20: CPT | Performed by: NURSE PRACTITIONER

## 2025-08-11 VITALS
RESPIRATION RATE: 15 BRPM | TEMPERATURE: 97.8 F | OXYGEN SATURATION: 96 % | SYSTOLIC BLOOD PRESSURE: 108 MMHG | HEART RATE: 70 BPM | DIASTOLIC BLOOD PRESSURE: 69 MMHG

## 2025-08-11 PROBLEM — M11.20 PSEUDOGOUT: Status: ACTIVE | Noted: 2025-08-11

## 2025-08-15 ENCOUNTER — NURSING HOME VISIT (OUTPATIENT)
Dept: POST ACUTE CARE | Facility: EXTERNAL LOCATION | Age: 89
End: 2025-08-15
Payer: MEDICARE

## 2025-08-15 DIAGNOSIS — R52 PAIN: ICD-10-CM

## 2025-08-15 DIAGNOSIS — K21.9 GASTROESOPHAGEAL REFLUX DISEASE, UNSPECIFIED WHETHER ESOPHAGITIS PRESENT: ICD-10-CM

## 2025-08-15 DIAGNOSIS — I50.20 HEART FAILURE WITH REDUCED EJECTION FRACTION: ICD-10-CM

## 2025-08-15 DIAGNOSIS — I10 PRIMARY HYPERTENSION: ICD-10-CM

## 2025-08-15 DIAGNOSIS — Z95.810 CARDIAC DEFIBRILLATOR IN PLACE: Primary | ICD-10-CM

## 2025-08-15 DIAGNOSIS — N18.9 CHRONIC KIDNEY DISEASE, UNSPECIFIED CKD STAGE: ICD-10-CM

## 2025-08-15 DIAGNOSIS — I47.20 VENTRICULAR TACHYCARDIA (PAROXYSMAL): ICD-10-CM

## 2025-08-15 DIAGNOSIS — R53.81 PHYSICAL DECONDITIONING: ICD-10-CM

## 2025-08-15 PROCEDURE — 99308 SBSQ NF CARE LOW MDM 20: CPT | Performed by: NURSE PRACTITIONER

## 2025-08-17 ENCOUNTER — NURSING HOME VISIT (OUTPATIENT)
Dept: POST ACUTE CARE | Facility: EXTERNAL LOCATION | Age: 89
End: 2025-08-17
Payer: MEDICARE

## 2025-08-17 DIAGNOSIS — Z79.4 TYPE 2 DIABETES MELLITUS WITHOUT COMPLICATION, WITH LONG-TERM CURRENT USE OF INSULIN: ICD-10-CM

## 2025-08-17 DIAGNOSIS — I48.91 ATRIAL FIBRILLATION, UNSPECIFIED TYPE (MULTI): ICD-10-CM

## 2025-08-17 DIAGNOSIS — K21.9 GASTROESOPHAGEAL REFLUX DISEASE, UNSPECIFIED WHETHER ESOPHAGITIS PRESENT: ICD-10-CM

## 2025-08-17 DIAGNOSIS — E78.5 HYPERLIPIDEMIA, UNSPECIFIED HYPERLIPIDEMIA TYPE: ICD-10-CM

## 2025-08-17 DIAGNOSIS — J81.0 ACUTE PULMONARY EDEMA: ICD-10-CM

## 2025-08-17 DIAGNOSIS — N40.0 BENIGN PROSTATIC HYPERPLASIA WITHOUT LOWER URINARY TRACT SYMPTOMS: ICD-10-CM

## 2025-08-17 DIAGNOSIS — I50.20 HEART FAILURE WITH REDUCED EJECTION FRACTION: Primary | ICD-10-CM

## 2025-08-17 DIAGNOSIS — I25.10 ASHD (ARTERIOSCLEROTIC HEART DISEASE): ICD-10-CM

## 2025-08-17 DIAGNOSIS — E03.9 HYPOTHYROIDISM, ACQUIRED: ICD-10-CM

## 2025-08-17 DIAGNOSIS — R53.1 WEAKNESS: ICD-10-CM

## 2025-08-17 DIAGNOSIS — I10 PRIMARY HYPERTENSION: ICD-10-CM

## 2025-08-17 DIAGNOSIS — E11.9 TYPE 2 DIABETES MELLITUS WITHOUT COMPLICATION, WITH LONG-TERM CURRENT USE OF INSULIN: ICD-10-CM

## 2025-08-17 DIAGNOSIS — Z91.81 AT RISK FOR FALLS: ICD-10-CM

## 2025-08-17 PROCEDURE — 99309 SBSQ NF CARE MODERATE MDM 30: CPT | Performed by: INTERNAL MEDICINE

## 2025-08-18 VITALS
DIASTOLIC BLOOD PRESSURE: 66 MMHG | HEART RATE: 72 BPM | OXYGEN SATURATION: 96 % | TEMPERATURE: 97.8 F | SYSTOLIC BLOOD PRESSURE: 94 MMHG | RESPIRATION RATE: 15 BRPM

## 2025-08-19 VITALS
RESPIRATION RATE: 16 BRPM | DIASTOLIC BLOOD PRESSURE: 80 MMHG | HEART RATE: 78 BPM | TEMPERATURE: 98.1 F | SYSTOLIC BLOOD PRESSURE: 130 MMHG

## 2025-08-19 ASSESSMENT — ENCOUNTER SYMPTOMS
FEVER: 0
CHILLS: 0

## 2025-08-21 VITALS
HEART RATE: 82 BPM | DIASTOLIC BLOOD PRESSURE: 80 MMHG | SYSTOLIC BLOOD PRESSURE: 130 MMHG | RESPIRATION RATE: 18 BRPM | TEMPERATURE: 98.1 F

## 2025-08-21 ASSESSMENT — ENCOUNTER SYMPTOMS
CHILLS: 0
FEVER: 0

## 2025-08-22 ENCOUNTER — NURSING HOME VISIT (OUTPATIENT)
Dept: POST ACUTE CARE | Facility: EXTERNAL LOCATION | Age: 89
End: 2025-08-22
Payer: MEDICARE

## 2025-08-22 DIAGNOSIS — I10 PRIMARY HYPERTENSION: ICD-10-CM

## 2025-08-22 DIAGNOSIS — Z95.810 CARDIAC DEFIBRILLATOR IN PLACE: Primary | ICD-10-CM

## 2025-08-22 DIAGNOSIS — K21.9 GASTROESOPHAGEAL REFLUX DISEASE, UNSPECIFIED WHETHER ESOPHAGITIS PRESENT: ICD-10-CM

## 2025-08-22 DIAGNOSIS — R53.81 PHYSICAL DECONDITIONING: ICD-10-CM

## 2025-08-22 DIAGNOSIS — I50.20 HEART FAILURE WITH REDUCED EJECTION FRACTION: ICD-10-CM

## 2025-08-22 DIAGNOSIS — I47.20 VENTRICULAR TACHYCARDIA (PAROXYSMAL): ICD-10-CM

## 2025-08-22 PROCEDURE — 99308 SBSQ NF CARE LOW MDM 20: CPT | Performed by: NURSE PRACTITIONER

## 2025-08-24 ENCOUNTER — NURSING HOME VISIT (OUTPATIENT)
Dept: POST ACUTE CARE | Facility: EXTERNAL LOCATION | Age: 89
End: 2025-08-24
Payer: MEDICARE

## 2025-08-24 VITALS
RESPIRATION RATE: 15 BRPM | DIASTOLIC BLOOD PRESSURE: 70 MMHG | SYSTOLIC BLOOD PRESSURE: 107 MMHG | OXYGEN SATURATION: 95 % | HEART RATE: 70 BPM | TEMPERATURE: 97 F

## 2025-08-24 DIAGNOSIS — I48.91 ATRIAL FIBRILLATION, UNSPECIFIED TYPE (MULTI): Primary | ICD-10-CM

## 2025-08-24 DIAGNOSIS — I25.10 ASHD (ARTERIOSCLEROTIC HEART DISEASE): ICD-10-CM

## 2025-08-24 DIAGNOSIS — E11.9 TYPE 2 DIABETES MELLITUS WITHOUT COMPLICATION, WITH LONG-TERM CURRENT USE OF INSULIN: ICD-10-CM

## 2025-08-24 DIAGNOSIS — J81.0 ACUTE PULMONARY EDEMA: ICD-10-CM

## 2025-08-24 DIAGNOSIS — N40.0 BENIGN PROSTATIC HYPERPLASIA WITHOUT LOWER URINARY TRACT SYMPTOMS: ICD-10-CM

## 2025-08-24 DIAGNOSIS — E03.9 HYPOTHYROIDISM, ACQUIRED: ICD-10-CM

## 2025-08-24 DIAGNOSIS — Z79.4 TYPE 2 DIABETES MELLITUS WITHOUT COMPLICATION, WITH LONG-TERM CURRENT USE OF INSULIN: ICD-10-CM

## 2025-08-24 DIAGNOSIS — K21.9 GASTROESOPHAGEAL REFLUX DISEASE, UNSPECIFIED WHETHER ESOPHAGITIS PRESENT: ICD-10-CM

## 2025-08-24 DIAGNOSIS — I10 PRIMARY HYPERTENSION: ICD-10-CM

## 2025-08-24 DIAGNOSIS — Z91.81 AT RISK FOR FALLS: ICD-10-CM

## 2025-08-24 DIAGNOSIS — R53.1 WEAKNESS: ICD-10-CM

## 2025-08-24 DIAGNOSIS — I50.20 HEART FAILURE WITH REDUCED EJECTION FRACTION: ICD-10-CM

## 2025-08-24 DIAGNOSIS — E78.5 HYPERLIPIDEMIA, UNSPECIFIED HYPERLIPIDEMIA TYPE: ICD-10-CM

## 2025-08-24 PROBLEM — C91.00 ACUTE LYMPHOBLASTIC LEUKEMIA NOT HAVING ACHIEVED REMISSION (MULTI): Status: ACTIVE | Noted: 2025-08-24

## 2025-08-24 PROCEDURE — 99309 SBSQ NF CARE MODERATE MDM 30: CPT | Performed by: INTERNAL MEDICINE

## 2025-08-25 ENCOUNTER — APPOINTMENT (OUTPATIENT)
Dept: CARDIOLOGY | Facility: HOSPITAL | Age: 89
End: 2025-08-25
Payer: MEDICARE

## 2025-08-28 VITALS
SYSTOLIC BLOOD PRESSURE: 126 MMHG | DIASTOLIC BLOOD PRESSURE: 78 MMHG | TEMPERATURE: 97.9 F | HEART RATE: 80 BPM | RESPIRATION RATE: 18 BRPM

## 2025-08-28 ASSESSMENT — ENCOUNTER SYMPTOMS
FEVER: 0
CHILLS: 0

## 2025-08-30 ENCOUNTER — NURSING HOME VISIT (OUTPATIENT)
Dept: POST ACUTE CARE | Facility: EXTERNAL LOCATION | Age: 89
End: 2025-08-30
Payer: MEDICARE

## 2025-08-30 DIAGNOSIS — I25.10 ASHD (ARTERIOSCLEROTIC HEART DISEASE): ICD-10-CM

## 2025-08-30 DIAGNOSIS — J81.0 ACUTE PULMONARY EDEMA: ICD-10-CM

## 2025-08-30 DIAGNOSIS — N40.0 BENIGN PROSTATIC HYPERPLASIA WITHOUT LOWER URINARY TRACT SYMPTOMS: ICD-10-CM

## 2025-08-30 DIAGNOSIS — E11.9 TYPE 2 DIABETES MELLITUS WITHOUT COMPLICATION, WITH LONG-TERM CURRENT USE OF INSULIN: ICD-10-CM

## 2025-08-30 DIAGNOSIS — Z91.81 AT RISK FOR FALLS: ICD-10-CM

## 2025-08-30 DIAGNOSIS — I48.91 ATRIAL FIBRILLATION, UNSPECIFIED TYPE (MULTI): ICD-10-CM

## 2025-08-30 DIAGNOSIS — Z79.4 TYPE 2 DIABETES MELLITUS WITHOUT COMPLICATION, WITH LONG-TERM CURRENT USE OF INSULIN: ICD-10-CM

## 2025-08-30 DIAGNOSIS — I10 PRIMARY HYPERTENSION: ICD-10-CM

## 2025-08-30 DIAGNOSIS — I50.20 HEART FAILURE WITH REDUCED EJECTION FRACTION: Primary | ICD-10-CM

## 2025-08-30 DIAGNOSIS — R53.1 WEAKNESS: ICD-10-CM

## 2025-08-30 DIAGNOSIS — E78.5 HYPERLIPIDEMIA, UNSPECIFIED HYPERLIPIDEMIA TYPE: ICD-10-CM

## 2025-08-30 DIAGNOSIS — E03.9 HYPOTHYROIDISM, ACQUIRED: ICD-10-CM

## 2025-08-30 DIAGNOSIS — K21.9 GASTROESOPHAGEAL REFLUX DISEASE, UNSPECIFIED WHETHER ESOPHAGITIS PRESENT: ICD-10-CM

## 2025-08-30 PROCEDURE — 99309 SBSQ NF CARE MODERATE MDM 30: CPT | Performed by: INTERNAL MEDICINE

## 2025-09-02 VITALS
DIASTOLIC BLOOD PRESSURE: 80 MMHG | SYSTOLIC BLOOD PRESSURE: 130 MMHG | HEART RATE: 84 BPM | TEMPERATURE: 98 F | RESPIRATION RATE: 16 BRPM

## 2025-09-02 ASSESSMENT — ENCOUNTER SYMPTOMS
CHILLS: 0
FEVER: 0

## 2025-09-04 ENCOUNTER — OFFICE VISIT (OUTPATIENT)
Dept: CARDIOLOGY | Facility: HOSPITAL | Age: 89
End: 2025-09-04
Payer: MEDICARE

## 2025-09-04 VITALS
OXYGEN SATURATION: 94 % | DIASTOLIC BLOOD PRESSURE: 75 MMHG | SYSTOLIC BLOOD PRESSURE: 120 MMHG | HEART RATE: 69 BPM | WEIGHT: 147 LBS | BODY MASS INDEX: 19.91 KG/M2 | HEIGHT: 72 IN

## 2025-09-04 DIAGNOSIS — I47.20 VENTRICULAR TACHYCARDIA, UNSPECIFIED (MULTI): Primary | ICD-10-CM

## 2025-09-04 DIAGNOSIS — Z95.810 BIVENTRICULAR AUTOMATIC IMPLANTABLE CARDIOVERTER DEFIBRILLATOR IN SITU: ICD-10-CM

## 2025-09-04 LAB
ATRIAL RATE: 70 BPM
P OFFSET: 136 MS
P ONSET: 95 MS
PR INTERVAL: 204 MS
Q ONSET: 192 MS
QRS COUNT: 12 BEATS
QRS DURATION: 192 MS
QT INTERVAL: 508 MS
QTC CALCULATION(BAZETT): 548 MS
QTC FREDERICIA: 535 MS
R AXIS: 265 DEGREES
T AXIS: 64 DEGREES
T OFFSET: 446 MS
VENTRICULAR RATE: 70 BPM

## 2025-09-04 PROCEDURE — 1157F ADVNC CARE PLAN IN RCRD: CPT | Performed by: NURSE PRACTITIONER

## 2025-09-04 PROCEDURE — 3078F DIAST BP <80 MM HG: CPT | Performed by: NURSE PRACTITIONER

## 2025-09-04 PROCEDURE — 3074F SYST BP LT 130 MM HG: CPT | Performed by: NURSE PRACTITIONER

## 2025-09-04 PROCEDURE — 1160F RVW MEDS BY RX/DR IN RCRD: CPT | Performed by: NURSE PRACTITIONER

## 2025-09-04 PROCEDURE — 1036F TOBACCO NON-USER: CPT | Performed by: NURSE PRACTITIONER

## 2025-09-04 PROCEDURE — 99212 OFFICE O/P EST SF 10 MIN: CPT

## 2025-09-04 PROCEDURE — 1159F MED LIST DOCD IN RCRD: CPT | Performed by: NURSE PRACTITIONER

## 2025-09-04 PROCEDURE — 93005 ELECTROCARDIOGRAM TRACING: CPT | Performed by: NURSE PRACTITIONER

## 2025-09-04 PROCEDURE — 99215 OFFICE O/P EST HI 40 MIN: CPT | Performed by: NURSE PRACTITIONER

## 2025-09-04 RX ORDER — ADHESIVE BANDAGE
BANDAGE TOPICAL DAILY PRN
COMMUNITY

## 2025-09-04 RX ORDER — PREDNISONE 20 MG/1
20 TABLET ORAL 2 TIMES DAILY
COMMUNITY

## 2025-09-04 RX ORDER — PANTOPRAZOLE SODIUM 40 MG/1
40 TABLET, DELAYED RELEASE ORAL
COMMUNITY

## 2025-09-04 RX ORDER — CIPROFLOXACIN 250 MG/1
250 TABLET, FILM COATED ORAL 2 TIMES DAILY
COMMUNITY

## 2025-09-04 RX ORDER — GLUCAGON INJECTION, SOLUTION 1 MG/.2ML
INJECTION, SOLUTION SUBCUTANEOUS AS NEEDED
COMMUNITY

## 2025-09-04 RX ORDER — ONDANSETRON 4 MG/1
4 TABLET, FILM COATED ORAL EVERY 8 HOURS PRN
COMMUNITY

## 2025-09-04 RX ORDER — DEXTROMETHORPHAN POLISTIREX 30 MG/5 ML
1 SUSPENSION, EXTENDED RELEASE 12 HR ORAL DAILY PRN
COMMUNITY

## 2025-09-04 RX ORDER — BISACODYL 10 MG/1
10 SUPPOSITORY RECTAL DAILY PRN
COMMUNITY

## 2025-09-04 ASSESSMENT — ENCOUNTER SYMPTOMS
SORE THROAT: 0
SHORTNESS OF BREATH: 0
ORTHOPNEA: 0
NEAR-SYNCOPE: 0
BLURRED VISION: 0
HEMOPTYSIS: 0
SYNCOPE: 0
ANOREXIA: 1
COUGH: 0
MYALGIAS: 1
FALLS: 0
DIAPHORESIS: 0
PALPITATIONS: 0
LIGHT-HEADEDNESS: 0
DYSPNEA ON EXERTION: 0
DIZZINESS: 0
IRREGULAR HEARTBEAT: 0
WEAKNESS: 1
PND: 0
HEADACHES: 0
SPUTUM PRODUCTION: 0
SNORING: 0
DOUBLE VISION: 0
FEVER: 0
NAUSEA: 1